# Patient Record
Sex: MALE | Race: WHITE | NOT HISPANIC OR LATINO | Employment: OTHER | URBAN - METROPOLITAN AREA
[De-identification: names, ages, dates, MRNs, and addresses within clinical notes are randomized per-mention and may not be internally consistent; named-entity substitution may affect disease eponyms.]

---

## 2017-01-16 DIAGNOSIS — Z01.810 PRE-OPERATIVE CARDIOVASCULAR EXAMINATION: ICD-10-CM

## 2017-01-16 LAB — EKG IMPRESSION: NORMAL

## 2017-01-16 RX ORDER — CLINDAMYCIN HYDROCHLORIDE 300 MG/1
300 CAPSULE ORAL 4 TIMES DAILY
Status: ON HOLD | COMMUNITY
End: 2019-05-14

## 2017-01-16 RX ORDER — CIPROFLOXACIN HYDROCHLORIDE 3.5 MG/ML
1 SOLUTION/ DROPS TOPICAL 4 TIMES DAILY
COMMUNITY
End: 2019-05-09

## 2017-01-16 RX ORDER — KETOROLAC TROMETHAMINE 5 MG/ML
1 SOLUTION OPHTHALMIC 2 TIMES DAILY
COMMUNITY
End: 2019-05-09

## 2017-01-16 RX ORDER — PREDNISOLONE ACETATE 10 MG/ML
1 SUSPENSION/ DROPS OPHTHALMIC 3 TIMES DAILY
COMMUNITY
End: 2019-05-09

## 2017-01-17 ENCOUNTER — HOSPITAL ENCOUNTER (OUTPATIENT)
Facility: MEDICAL CENTER | Age: 82
End: 2017-01-17
Attending: OPHTHALMOLOGY | Admitting: OPHTHALMOLOGY
Payer: MEDICARE

## 2017-01-17 VITALS
TEMPERATURE: 98.3 F | DIASTOLIC BLOOD PRESSURE: 65 MMHG | BODY MASS INDEX: 39.59 KG/M2 | RESPIRATION RATE: 16 BRPM | WEIGHT: 298.72 LBS | SYSTOLIC BLOOD PRESSURE: 110 MMHG | OXYGEN SATURATION: 92 % | HEIGHT: 73 IN | HEART RATE: 56 BPM

## 2017-01-17 PROBLEM — H26.9 RIGHT CATARACT: Status: ACTIVE | Noted: 2017-01-17

## 2017-01-17 PROCEDURE — 700101 HCHG RX REV CODE 250

## 2017-01-17 PROCEDURE — 700111 HCHG RX REV CODE 636 W/ 250 OVERRIDE (IP): Performed by: ANESTHESIOLOGY

## 2017-01-17 PROCEDURE — 500791 HCHG KNIFE, SLIT: Performed by: OPHTHALMOLOGY

## 2017-01-17 PROCEDURE — 501393 HCHG SOLUTION BSS 500ML: Performed by: OPHTHALMOLOGY

## 2017-01-17 PROCEDURE — 160048 HCHG OR STATISTICAL LEVEL 1-5: Performed by: OPHTHALMOLOGY

## 2017-01-17 PROCEDURE — 99153 MOD SED SAME PHYS/QHP EA: CPT | Performed by: OPHTHALMOLOGY

## 2017-01-17 PROCEDURE — 501749 HCHG SHELL REV 276: Performed by: OPHTHALMOLOGY

## 2017-01-17 PROCEDURE — 160029 HCHG SURGERY MINUTES - 1ST 30 MINS LEVEL 4: Performed by: OPHTHALMOLOGY

## 2017-01-17 PROCEDURE — 700111 HCHG RX REV CODE 636 W/ 250 OVERRIDE (IP)

## 2017-01-17 PROCEDURE — 160035 HCHG PACU - 1ST 60 MINS PHASE I: Performed by: OPHTHALMOLOGY

## 2017-01-17 PROCEDURE — 99152 MOD SED SAME PHYS/QHP 5/>YRS: CPT | Performed by: OPHTHALMOLOGY

## 2017-01-17 PROCEDURE — 160002 HCHG RECOVERY MINUTES (STAT): Performed by: OPHTHALMOLOGY

## 2017-01-17 DEVICE — IMPLANTABLE DEVICE: Type: IMPLANTABLE DEVICE | Status: FUNCTIONAL

## 2017-01-17 RX ORDER — TROPICAMIDE 10 MG/ML
SOLUTION/ DROPS OPHTHALMIC
Status: COMPLETED
Start: 2017-01-17 | End: 2017-01-17

## 2017-01-17 RX ORDER — SODIUM CHLORIDE, SODIUM LACTATE, POTASSIUM CHLORIDE, CALCIUM CHLORIDE 600; 310; 30; 20 MG/100ML; MG/100ML; MG/100ML; MG/100ML
INJECTION, SOLUTION INTRAVENOUS CONTINUOUS
Status: DISCONTINUED | OUTPATIENT
Start: 2017-01-17 | End: 2017-01-17 | Stop reason: HOSPADM

## 2017-01-17 RX ORDER — TETRACAINE HYDROCHLORIDE 5 MG/ML
SOLUTION OPHTHALMIC
Status: DISCONTINUED | OUTPATIENT
Start: 2017-01-17 | End: 2017-01-17 | Stop reason: HOSPADM

## 2017-01-17 RX ORDER — MOXIFLOXACIN 5 MG/ML
SOLUTION/ DROPS OPHTHALMIC
Status: COMPLETED
Start: 2017-01-17 | End: 2017-01-17

## 2017-01-17 RX ORDER — TETRACAINE HYDROCHLORIDE 5 MG/ML
SOLUTION OPHTHALMIC
Status: COMPLETED
Start: 2017-01-17 | End: 2017-01-17

## 2017-01-17 RX ORDER — PHENYLEPHRINE HYDROCHLORIDE 25 MG/ML
SOLUTION/ DROPS OPHTHALMIC
Status: COMPLETED
Start: 2017-01-17 | End: 2017-01-17

## 2017-01-17 RX ORDER — MOXIFLOXACIN 5 MG/ML
SOLUTION OPHTHALMIC
Status: DISCONTINUED | OUTPATIENT
Start: 2017-01-17 | End: 2017-01-17 | Stop reason: HOSPADM

## 2017-01-17 RX ORDER — KETOROLAC TROMETHAMINE 5 MG/ML
SOLUTION OPHTHALMIC
Status: COMPLETED
Start: 2017-01-17 | End: 2017-01-17

## 2017-01-17 RX ADMIN — TROPICAMIDE 1 DROP: 10 SOLUTION/ DROPS OPHTHALMIC at 08:21

## 2017-01-17 RX ADMIN — PHENYLEPHRINE HYDROCHLORIDE 1 DROP: 2.5 SOLUTION/ DROPS OPHTHALMIC at 08:21

## 2017-01-17 RX ADMIN — MOXIFLOXACIN HYDROCHLORIDE 1 DROP: 5 SOLUTION/ DROPS OPHTHALMIC at 08:06

## 2017-01-17 RX ADMIN — TROPICAMIDE 1 DROP: 10 SOLUTION/ DROPS OPHTHALMIC at 08:13

## 2017-01-17 RX ADMIN — TROPICAMIDE 1 DROP: 10 SOLUTION/ DROPS OPHTHALMIC at 08:07

## 2017-01-17 RX ADMIN — TETRACAINE HYDROCHLORIDE 1 DROP: 5 SOLUTION OPHTHALMIC at 08:21

## 2017-01-17 RX ADMIN — PHENYLEPHRINE HYDROCHLORIDE 1 DROP: 2.5 SOLUTION/ DROPS OPHTHALMIC at 08:13

## 2017-01-17 RX ADMIN — MOXIFLOXACIN HYDROCHLORIDE 1 DROP: 5 SOLUTION/ DROPS OPHTHALMIC at 08:13

## 2017-01-17 RX ADMIN — TETRACAINE HYDROCHLORIDE 1 DROP: 5 SOLUTION OPHTHALMIC at 08:07

## 2017-01-17 RX ADMIN — PHENYLEPHRINE HYDROCHLORIDE 1 DROP: 2.5 SOLUTION/ DROPS OPHTHALMIC at 08:07

## 2017-01-17 RX ADMIN — SODIUM CHLORIDE, POTASSIUM CHLORIDE, SODIUM LACTATE AND CALCIUM CHLORIDE 30 ML: 600; 310; 30; 20 INJECTION, SOLUTION INTRAVENOUS at 08:15

## 2017-01-17 RX ADMIN — KETOROLAC TROMETHAMINE 1 DROP: 5 SOLUTION OPHTHALMIC at 08:21

## 2017-01-17 RX ADMIN — KETOROLAC TROMETHAMINE 1 DROP: 5 SOLUTION OPHTHALMIC at 08:13

## 2017-01-17 RX ADMIN — MOXIFLOXACIN HYDROCHLORIDE 1 DROP: 5 SOLUTION/ DROPS OPHTHALMIC at 08:20

## 2017-01-17 RX ADMIN — KETOROLAC TROMETHAMINE 1 DROP: 5 SOLUTION OPHTHALMIC at 08:07

## 2017-01-17 ASSESSMENT — PAIN SCALES - GENERAL
PAINLEVEL_OUTOF10: 0

## 2017-01-17 NOTE — DISCHARGE INSTRUCTIONS
"  ACTIVITY: Rest and take it easy for the first 24 hours.  A responsible adult is recommended to remain with you during that time.  It is normal to feel sleepy.  We encourage you to not do anything that requires balance, judgment or coordination.    MILD FLU-LIKE SYMPTOMS ARE NORMAL. YOU MAY EXPERIENCE GENERALIZED MUSCLE ACHES, THROAT IRRITATION, HEADACHE AND/OR SOME NAUSEA.    FOR 24 HOURS DO NOT:  Drive, operate machinery or run household appliances.  Drink beer or alcoholic beverages.   Make important decisions or sign legal documents.    SPECIAL INSTRUCTIONS: See \"After Your Surgery\" instructions    DIET: To avoid nausea, slowly advance diet as tolerated, avoiding spicy or greasy foods for the first day.  Add more substantial food to your diet according to your physician's instructions.  Babies can be fed formula or breast milk as soon as they are hungry.  INCREASE FLUIDS AND FIBER TO AVOID CONSTIPATION.    SURGICAL DRESSING/BATHING: per instructions    FOLLOW-UP APPOINTMENT:  A follow-up appointment should be arranged with your doctor 2:15pm Today    You should CALL YOUR PHYSICIAN if you develop:  Fever greater than 101 degrees F.  Pain not relieved by medication, or persistent nausea or vomiting.  Excessive bleeding (blood soaking through dressing) or unexpected drainage from the wound.  Extreme redness or swelling around the incision site, drainage of pus or foul smelling drainage.  Inability to urinate or empty your bladder within 8 hours.  Problems with breathing or chest pain.    You should call 911 if you develop problems with breathing or chest pain.  If you are unable to contact your doctor or surgical center, you should go to the nearest emergency room or urgent care center.  Physician's telephone #: [phone] Dr Hinson    If any questions arise, call your doctor.  If your doctor is not available, please feel free to call the Surgical Center at [phone].  The Center is open Monday through Friday " from 7AM to 7PM.  You can also call the HEALTH HOTLINE open 24 hours/day, 7 days/week and speak to a nurse at [phone], or toll free at [phone].    A registered nurse may call you a few days after your surgery to see how you are doing after your procedure.    MEDICATIONS: Resume taking daily medication.  Take prescribed pain medication with food.  If no medication is prescribed, you may take non-aspirin pain medication if needed.  PAIN MEDICATION CAN BE VERY CONSTIPATING.  Take a stool softener or laxative such as senokot, pericolace, or milk of magnesia if needed.    Prescription given for N/A.  Last pain medication given at N/A.    If your physician has prescribed pain medication that includes Acetaminophen (Tylenol), do not take additional Acetaminophen (Tylenol) while taking the prescribed medication.    Depression / Suicide Risk    As you are discharged from this [facility] facility, it is important to learn how to keep safe from harming yourself.    Recognize the warning signs:  · Abrupt changes in personality, positive or negative- including increase in energy   · Giving away possessions  · Change in eating patterns- significant weight changes-  positive or negative  · Change in sleeping patterns- unable to sleep or sleeping all the time   · Unwillingness or inability to communicate  · Depression  · Unusual sadness, discouragement and loneliness  · Talk of wanting to die  · Neglect of personal appearance   · Rebelliousness- reckless behavior  · Withdrawal from people/activities they love  · Confusion- inability to concentrate     If you or a loved one observes any of these behaviors or has concerns about self-harm, here's what you can do:  · Talk about it- your feelings and reasons for harming yourself  · Remove any means that you might use to hurt yourself (examples: pills, rope, extension cords, firearm)  · Get professional help from the community (Mental Health, Substance Abuse,  psychological counseling)  · Do not be alone:Call your Safe Contact- someone whom you trust who will be there for you.  · Call your local CRISIS HOTLINE 725-8120 or 640-703-4771  · Call your local Children's Mobile Crisis Response Team [place] (312) 169-0435 or www.CXOWARE  · Call the toll free National Suicide Prevention Hotlines   · National Suicide Prevention Lifeline 614-815-INKP (9085)  · National Hope Line Network 800-SUICIDE (364-3228)

## 2017-01-17 NOTE — IP AVS SNAPSHOT
" After Visit Summary                                                                                                                Name:Castillo Echevarria Jr.  Medical Record Number:[id number]  CSN: [id number]    YOB: 1933   Age: 83 y.o.  Sex: male  HT:1.854 m (6' 1\") WT: 135.5 kg (298 lb 11.6 oz)          Admit Date: 1/17/2017     Discharge Date:   Today's Date: 1/17/2017  Attending Doctor:  Jero Hinson M.D.                  Allergies:  Review of patient's allergies indicates no known allergies.                Discharge Instructions         ACTIVITY: Rest and take it easy for the first 24 hours.  A responsible adult is recommended to remain with you during that time.  It is normal to feel sleepy.  We encourage you to not do anything that requires balance, judgment or coordination.    MILD FLU-LIKE SYMPTOMS ARE NORMAL. YOU MAY EXPERIENCE GENERALIZED MUSCLE ACHES, THROAT IRRITATION, HEADACHE AND/OR SOME NAUSEA.    FOR 24 HOURS DO NOT:  Drive, operate machinery or run household appliances.  Drink beer or alcoholic beverages.   Make important decisions or sign legal documents.    SPECIAL INSTRUCTIONS: See \"After Your Surgery\" instructions    DIET: To avoid nausea, slowly advance diet as tolerated, avoiding spicy or greasy foods for the first day.  Add more substantial food to your diet according to your physician's instructions.  Babies can be fed formula or breast milk as soon as they are hungry.  INCREASE FLUIDS AND FIBER TO AVOID CONSTIPATION.    SURGICAL DRESSING/BATHING: per instructions    FOLLOW-UP APPOINTMENT:  A follow-up appointment should be arranged with your doctor 2:15pm Today    You should CALL YOUR PHYSICIAN if you develop:  Fever greater than 101 degrees F.  Pain not relieved by medication, or persistent nausea or vomiting.  Excessive bleeding (blood soaking through dressing) or unexpected drainage from the wound.  Extreme redness or swelling around the incision site, drainage of pus " or foul smelling drainage.  Inability to urinate or empty your bladder within 8 hours.  Problems with breathing or chest pain.    You should call 911 if you develop problems with breathing or chest pain.  If you are unable to contact your doctor or surgical center, you should go to the nearest emergency room or urgent care center.  Physician's telephone #: [phone] Dr Hinson    If any questions arise, call your doctor.  If your doctor is not available, please feel free to call the Surgical Center at [phone].  The Center is open Monday through Friday from 7AM to 7PM.  You can also call the [employer] HOTLINE open 24 hours/day, 7 days/week and speak to a nurse at [phone], or toll free at [phone].    A registered nurse may call you a few days after your surgery to see how you are doing after your procedure.    MEDICATIONS: Resume taking daily medication.  Take prescribed pain medication with food.  If no medication is prescribed, you may take non-aspirin pain medication if needed.  PAIN MEDICATION CAN BE VERY CONSTIPATING.  Take a stool softener or laxative such as senokot, pericolace, or milk of magnesia if needed.    Prescription given for N/A.  Last pain medication given at N/A.    If your physician has prescribed pain medication that includes Acetaminophen (Tylenol), do not take additional Acetaminophen (Tylenol) while taking the prescribed medication.    Depression / Suicide Risk    As you are discharged from this [facility] facility, it is important to learn how to keep safe from harming yourself.    Recognize the warning signs:  · Abrupt changes in personality, positive or negative- including increase in energy   · Giving away possessions  · Change in eating patterns- significant weight changes-  positive or negative  · Change in sleeping patterns- unable to sleep or sleeping all the time   · Unwillingness or inability to communicate  · Depression  · Unusual sadness, discouragement and  loneliness  · Talk of wanting to die  · Neglect of personal appearance   · Rebelliousness- reckless behavior  · Withdrawal from people/activities they love  · Confusion- inability to concentrate     If you or a loved one observes any of these behaviors or has concerns about self-harm, here's what you can do:  · Talk about it- your feelings and reasons for harming yourself  · Remove any means that you might use to hurt yourself (examples: pills, rope, extension cords, firearm)  · Get professional help from the community (Mental Health, Substance Abuse, psychological counseling)  · Do not be alone:Call your Safe Contact- someone whom you trust who will be there for you.  · Call your local CRISIS HOTLINE 544-2198 or 619-491-4905  · Call your local Children's Mobile Crisis Response Team [place] (613) 477-1626 or www.Me[employer] Mantra  · Call the toll free National Suicide Prevention Hotlines   · National Suicide Prevention Lifeline 850-471-KPRN (5292)  · Nutricate Line Network 800-SUICIDE (546-9131)       Medication List      ASK your doctor about these medications        Instructions    atenolol 25 MG Tabs   Commonly known as:  TENORMIN    Take 25 mg by mouth every day.   Dose:  25 mg       ciprofloxacin 0.3 % Soln   Commonly known as:  CILOXIN    Place 1 Drop in both eyes 4 times a day.   Dose:  1 Drop       clindamycin 300 MG Caps   Commonly known as:  CLEOCIN    Take 300 mg by mouth. Takes 2 caps i hour pre-procedure   Dose:  300 mg       ketorolac 0.5 % Soln   Commonly known as:  ACULAR    Place 1 Drop in both eyes 2 Times a Day. Right eye   Dose:  1 Drop       levothyroxine 100 MCG Tabs   Commonly known as:  SYNTHROID    Take 100 mcg by mouth every day.   Dose:  100 mcg       LISINOPRIL-HYDROCHLOROTHIAZIDE PO    Take  by mouth every day.       prednisoLONE acetate 1 % Susp   Commonly known as:  PRED FORTE    Place 1 Drop in both eyes 3 times a day.   Dose:  1 Drop       simvastatin 10 MG Tabs   Commonly  known as:  ZOCOR    Take 10 mg by mouth every evening.   Dose:  10 mg       XARELTO 20 MG Tabs tablet   Generic drug:  rivaroxaban    Take 20 mg by mouth every day.   Dose:  20 mg               Medication Information     Above and/or attached are the medications your physician expects you to take upon discharge. Review all of your home medications and newly ordered medications with your doctor and/or pharmacist. Follow medication instructions as directed by your doctor and/or pharmacist. Please keep your medication list with you and share with your physician. Update the information when medications are discontinued, doses are changed, or new medications (including over-the-counter products) are added; and carry medication information at all times in the event of emergency situations.        Resources     Quit Smoking / Tobacco Use:    I understand the use of any tobacco products increases my chance of suffering from future heart disease or stroke and could cause other illnesses which may shorten my life. Quitting the use of tobacco products is the single most important thing I can do to improve my health. For further information on smoking / tobacco cessation call a Toll Free Quit Line at 1-389.229.4393 (*National Cancer [place]) or 1-465.588.6821 (American Lung Association) or you can access the web based program at www.lungusa.org.    [place] Tobacco Users Help Line:  (497) 147-4068       Toll Free: 1-849.207.7655  Quit Tobacco Program [facility] (029)585-4702    Crisis Hotline:    [place] Crisis Hotline:  5-724-UTSPZBM or 1-257.624.6672    [place] Crisis Hotline:    1-992.317.2581 or 213-466-0814    Discharge Survey:   Thank you for choosing [facility]. We hope we did everything we could to make your hospital stay a pleasant one. You may be receiving a survey and we would appreciate your time and participation in answering the questions. Your input is very valuable to us in our efforts  to improve our service to our patients and their families.            Signatures     My signature on this form indicates that:    1. I acknowledge receipt and understanding of these Home Care Instruction.  2. My questions regarding this information have been answered to my satisfaction.  3. I have formulated a plan with my discharge nurse to obtain my prescribed medications for home.    __________________________________      __________________________________                   Patient Signature                                 Guardian/Responsible Adult Signature      __________________________________                 __________       ________                       Nurse Signature                                               Date                 Time

## 2017-01-17 NOTE — OP REPORT
DATE OF SERVICE:  01/17/2017    DATE OF OPERATION:  01/17/2017     PREOPERATIVE DIAGNOSIS:   Cataract, right eye.     POSTOPERATIVE DIAGNOSIS:  Cataract, right eye.     PROCEDURE:   Clear cornea phacoemulsification, cataract extraction, with   posterior chamber intraocular lens implant, right eye.     SURGEON:  Jero Hinson MD     ASSISTANT:  Syed Jasso scrub tech.     ANESTHESIOLOGIST:  Nikolas Blue MD     ANESTHESIA:  Topical with IV sedation.     INDICATION:   Visually significant cataract, right eye.  Patient requested   surgery.     COMPLICATIONS:  None.     PROCEDURE:   After informed consent was obtained, the patient was transported   to the operating room and placed in the supine position.  The right   periorbital area was sterilely prepped and draped.  A lid speculum was   inserted.  Tetracaine ophthalmic anesthetic was applied to the corneal   surface.  Using a fixation ring and a carrol Step-Knife, a temporal clear   corneal groove incision was created.  This was followed by a paracentesis.    The anterior chamber was inflated with Viscoat.  The temporal incision was   completed using a 2.4 mm keratome.  A cystotome was inserted and a   capsulorrhexis was initiated.  This was completed using Utrata forceps.  BSS   on a Binkhorst followed angle cannula was used to hydrodissect the lens.  The   nucleus splitters were inserted, and the lens was divided into 4 quadrants.    The phacoemulsification handpiece was inserted and the lens was removed with   phacoemulsification parameters of  8.33 CDE.  The irrigation aspiration   handpiece was used to remove cortical material.  The capsular bag was inflated   with Provisc.  An intraocular lens mode PCB00 lens implant with 20.0   diopters of power was injected into the capsular bag.  Irrigation aspiration   was performed to remove viscoelastic.  The margins of the wound were hydrated   with BSS.  The wounds were noted to be maintaining physiologic  pressure.  The   speculum was removed and the eye was dressed with Vigamox  ophthalmic drops.  The patient tolerated the procedure well and was   transported to the recovery room in good condition.  No complications were   encountered.       ____________________________________     MD DILIA RODRIGES / ZACKERY    DD:  01/17/2017 11:40:37  DT:  01/17/2017 13:59:14    D#:  [id number]  Job#:  [id number]

## 2017-01-17 NOTE — OR NURSING
1025- updated Dr Sher on pt status.  Per Dr Sher, ok for pt to go home home, does not need to stay x 1 hr for stop bang protocol.  Pt DC'd home with wife at 1028.

## 2017-01-17 NOTE — IP AVS SNAPSHOT
1/17/2017          Castillo Echevarria Jr.  [address] [place] [id number]    Dear Castillo:    [facility] wants to ensure your discharge home is safe and you or your loved ones have had all your questions answered regarding your care after you leave the hospital.    You may receive a telephone call within two days of your discharge.  This call is to make certain you understand your discharge instructions as well as ensure we provided you with the best care possible during your stay with us.     The call will only last approximately 3-5 minutes and will be done by a nurse.    Once again, we want to ensure your discharge home is safe and that you have a clear understanding of any next steps in your care.  If you have any questions or concerns, please do not hesitate to contact us, we are here for you.  Thank you for choosing [facility] for your healthcare needs.    Sincerely,    José Gonzalez    [facility]

## 2017-01-17 NOTE — OR NURSING
Pt VSS.  Pt desats to 88% on RA then rebounds back up to  92-94%.  Pt getting dressed with assist, will monitor O2 sat on RA while up in chair.

## 2019-05-09 ENCOUNTER — HOSPITAL ENCOUNTER (INPATIENT)
Facility: MEDICAL CENTER | Age: 84
LOS: 5 days | DRG: 580 | End: 2019-05-14
Attending: EMERGENCY MEDICINE | Admitting: HOSPITALIST
Payer: MEDICARE

## 2019-05-09 DIAGNOSIS — L08.9 TOE INFECTION: ICD-10-CM

## 2019-05-09 DIAGNOSIS — L08.9 FOOT INFECTION: ICD-10-CM

## 2019-05-09 PROBLEM — D68.2 FACTOR V DEFICIENCY (HCC): Status: ACTIVE | Noted: 2019-05-09

## 2019-05-09 PROBLEM — E03.9 HYPOTHYROIDISM: Status: ACTIVE | Noted: 2019-05-09

## 2019-05-09 PROBLEM — E78.5 HLD (HYPERLIPIDEMIA): Status: ACTIVE | Noted: 2019-05-09

## 2019-05-09 LAB
ALBUMIN SERPL BCP-MCNC: 3.8 G/DL (ref 3.2–4.9)
ALBUMIN/GLOB SERPL: 1.5 G/DL
ALP SERPL-CCNC: 64 U/L (ref 30–99)
ALT SERPL-CCNC: 17 U/L (ref 2–50)
ANION GAP SERPL CALC-SCNC: 7 MMOL/L (ref 0–11.9)
AST SERPL-CCNC: 22 U/L (ref 12–45)
BASOPHILS # BLD AUTO: 0.4 % (ref 0–1.8)
BASOPHILS # BLD: 0.04 K/UL (ref 0–0.12)
BILIRUB SERPL-MCNC: 0.4 MG/DL (ref 0.1–1.5)
BUN SERPL-MCNC: 18 MG/DL (ref 8–22)
CALCIUM SERPL-MCNC: 8.8 MG/DL (ref 8.5–10.5)
CHLORIDE SERPL-SCNC: 103 MMOL/L (ref 96–112)
CO2 SERPL-SCNC: 30 MMOL/L (ref 20–33)
CREAT SERPL-MCNC: 1.03 MG/DL (ref 0.5–1.4)
CRP SERPL HS-MCNC: 43.5 MG/L (ref 0–7.5)
EOSINOPHIL # BLD AUTO: 0.32 K/UL (ref 0–0.51)
EOSINOPHIL NFR BLD: 3.5 % (ref 0–6.9)
ERYTHROCYTE [DISTWIDTH] IN BLOOD BY AUTOMATED COUNT: 46.2 FL (ref 35.9–50)
GLOBULIN SER CALC-MCNC: 2.5 G/DL (ref 1.9–3.5)
GLUCOSE SERPL-MCNC: 125 MG/DL (ref 65–99)
HCT VFR BLD AUTO: 42.7 % (ref 42–52)
HGB BLD-MCNC: 14.1 G/DL (ref 14–18)
IMM GRANULOCYTES # BLD AUTO: 0.1 K/UL (ref 0–0.11)
IMM GRANULOCYTES NFR BLD AUTO: 1.1 % (ref 0–0.9)
LACTATE BLD-SCNC: 1.4 MMOL/L (ref 0.5–2)
LYMPHOCYTES # BLD AUTO: 1.93 K/UL (ref 1–4.8)
LYMPHOCYTES NFR BLD: 21.2 % (ref 22–41)
MCH RBC QN AUTO: 32.2 PG (ref 27–33)
MCHC RBC AUTO-ENTMCNC: 33 G/DL (ref 33.7–35.3)
MCV RBC AUTO: 97.5 FL (ref 81.4–97.8)
MONOCYTES # BLD AUTO: 0.71 K/UL (ref 0–0.85)
MONOCYTES NFR BLD AUTO: 7.8 % (ref 0–13.4)
NEUTROPHILS # BLD AUTO: 6.02 K/UL (ref 1.82–7.42)
NEUTROPHILS NFR BLD: 66 % (ref 44–72)
NRBC # BLD AUTO: 0 K/UL
NRBC BLD-RTO: 0 /100 WBC
PLATELET # BLD AUTO: 198 K/UL (ref 164–446)
PMV BLD AUTO: 8.9 FL (ref 9–12.9)
POTASSIUM SERPL-SCNC: 4 MMOL/L (ref 3.6–5.5)
PROT SERPL-MCNC: 6.3 G/DL (ref 6–8.2)
RBC # BLD AUTO: 4.38 M/UL (ref 4.7–6.1)
SODIUM SERPL-SCNC: 140 MMOL/L (ref 135–145)
WBC # BLD AUTO: 9.1 K/UL (ref 4.8–10.8)

## 2019-05-09 PROCEDURE — 99285 EMERGENCY DEPT VISIT HI MDM: CPT

## 2019-05-09 PROCEDURE — 96365 THER/PROPH/DIAG IV INF INIT: CPT

## 2019-05-09 PROCEDURE — 86141 C-REACTIVE PROTEIN HS: CPT

## 2019-05-09 PROCEDURE — 80048 BASIC METABOLIC PNL TOTAL CA: CPT

## 2019-05-09 PROCEDURE — 99223 1ST HOSP IP/OBS HIGH 75: CPT | Mod: AI | Performed by: HOSPITALIST

## 2019-05-09 PROCEDURE — 700105 HCHG RX REV CODE 258: Performed by: HOSPITALIST

## 2019-05-09 PROCEDURE — 36415 COLL VENOUS BLD VENIPUNCTURE: CPT

## 2019-05-09 PROCEDURE — 85025 COMPLETE CBC W/AUTO DIFF WBC: CPT

## 2019-05-09 PROCEDURE — 770006 HCHG ROOM/CARE - MED/SURG/GYN SEMI*

## 2019-05-09 PROCEDURE — 85730 THROMBOPLASTIN TIME PARTIAL: CPT

## 2019-05-09 PROCEDURE — 83605 ASSAY OF LACTIC ACID: CPT

## 2019-05-09 PROCEDURE — 87040 BLOOD CULTURE FOR BACTERIA: CPT | Mod: 91

## 2019-05-09 PROCEDURE — 96367 TX/PROPH/DG ADDL SEQ IV INF: CPT

## 2019-05-09 PROCEDURE — 85027 COMPLETE CBC AUTOMATED: CPT

## 2019-05-09 PROCEDURE — 700105 HCHG RX REV CODE 258: Performed by: EMERGENCY MEDICINE

## 2019-05-09 PROCEDURE — 700111 HCHG RX REV CODE 636 W/ 250 OVERRIDE (IP): Performed by: EMERGENCY MEDICINE

## 2019-05-09 PROCEDURE — 80053 COMPREHEN METABOLIC PANEL: CPT

## 2019-05-09 RX ORDER — AMOXICILLIN 250 MG
2 CAPSULE ORAL 2 TIMES DAILY
Status: DISCONTINUED | OUTPATIENT
Start: 2019-05-09 | End: 2019-05-14 | Stop reason: HOSPADM

## 2019-05-09 RX ORDER — SODIUM CHLORIDE 9 MG/ML
500 INJECTION, SOLUTION INTRAVENOUS
Status: COMPLETED | OUTPATIENT
Start: 2019-05-09 | End: 2019-05-09

## 2019-05-09 RX ORDER — VITS A,C,E/LUTEIN/MINERALS 300MCG-200
1 TABLET ORAL DAILY
COMMUNITY

## 2019-05-09 RX ORDER — SODIUM CHLORIDE 9 MG/ML
1000 INJECTION, SOLUTION INTRAVENOUS ONCE
Status: COMPLETED | OUTPATIENT
Start: 2019-05-09 | End: 2019-05-09

## 2019-05-09 RX ORDER — ATENOLOL 50 MG/1
25 TABLET ORAL DAILY
Status: DISCONTINUED | OUTPATIENT
Start: 2019-05-10 | End: 2019-05-14 | Stop reason: HOSPADM

## 2019-05-09 RX ORDER — HEPARIN SODIUM 1000 [USP'U]/ML
4400 INJECTION, SOLUTION INTRAVENOUS; SUBCUTANEOUS PRN
Status: DISCONTINUED | OUTPATIENT
Start: 2019-05-09 | End: 2019-05-11

## 2019-05-09 RX ORDER — POLYETHYLENE GLYCOL 3350 17 G/17G
1 POWDER, FOR SOLUTION ORAL
Status: DISCONTINUED | OUTPATIENT
Start: 2019-05-09 | End: 2019-05-14 | Stop reason: HOSPADM

## 2019-05-09 RX ORDER — ACETAMINOPHEN 325 MG/1
650 TABLET ORAL EVERY 6 HOURS PRN
Status: DISCONTINUED | OUTPATIENT
Start: 2019-05-09 | End: 2019-05-14 | Stop reason: HOSPADM

## 2019-05-09 RX ORDER — ATORVASTATIN CALCIUM 10 MG/1
10 TABLET, FILM COATED ORAL NIGHTLY
Status: DISCONTINUED | OUTPATIENT
Start: 2019-05-09 | End: 2019-05-14 | Stop reason: HOSPADM

## 2019-05-09 RX ORDER — ATORVASTATIN CALCIUM 10 MG/1
10 TABLET, FILM COATED ORAL NIGHTLY
COMMUNITY

## 2019-05-09 RX ORDER — BUMETANIDE 1 MG/1
1 TABLET ORAL DAILY
COMMUNITY

## 2019-05-09 RX ORDER — BUMETANIDE 1 MG/1
1 TABLET ORAL DAILY
Status: DISCONTINUED | OUTPATIENT
Start: 2019-05-10 | End: 2019-05-14 | Stop reason: HOSPADM

## 2019-05-09 RX ORDER — BISACODYL 10 MG
10 SUPPOSITORY, RECTAL RECTAL
Status: DISCONTINUED | OUTPATIENT
Start: 2019-05-09 | End: 2019-05-14 | Stop reason: HOSPADM

## 2019-05-09 RX ADMIN — AMPICILLIN AND SULBACTAM 3 G: 2; 1 INJECTION, POWDER, FOR SOLUTION INTRAVENOUS at 19:42

## 2019-05-09 RX ADMIN — SODIUM CHLORIDE 500 ML: 9 INJECTION, SOLUTION INTRAVENOUS at 21:48

## 2019-05-09 RX ADMIN — SODIUM CHLORIDE 1000 ML: 9 INJECTION, SOLUTION INTRAVENOUS at 19:43

## 2019-05-09 RX ADMIN — VANCOMYCIN HYDROCHLORIDE 3000 MG: 100 INJECTION, POWDER, LYOPHILIZED, FOR SOLUTION INTRAVENOUS at 21:49

## 2019-05-09 ASSESSMENT — PATIENT HEALTH QUESTIONNAIRE - PHQ9
SUM OF ALL RESPONSES TO PHQ9 QUESTIONS 1 AND 2: 0
2. FEELING DOWN, DEPRESSED, IRRITABLE, OR HOPELESS: NOT AT ALL
1. LITTLE INTEREST OR PLEASURE IN DOING THINGS: NOT AT ALL

## 2019-05-09 ASSESSMENT — ENCOUNTER SYMPTOMS
COUGH: 0
WHEEZING: 0
FOCAL WEAKNESS: 0
SORE THROAT: 0
VOMITING: 0
NAUSEA: 0
DIARRHEA: 0
DIZZINESS: 0
FEVER: 0
PHOTOPHOBIA: 0
DEPRESSION: 0
CHILLS: 0
SHORTNESS OF BREATH: 0
TINGLING: 0
HEADACHES: 0
ABDOMINAL PAIN: 0
MYALGIAS: 0
PALPITATIONS: 0

## 2019-05-10 ENCOUNTER — ANESTHESIA (OUTPATIENT)
Dept: SURGERY | Facility: MEDICAL CENTER | Age: 84
DRG: 580 | End: 2019-05-10
Payer: MEDICARE

## 2019-05-10 ENCOUNTER — ANESTHESIA EVENT (OUTPATIENT)
Dept: SURGERY | Facility: MEDICAL CENTER | Age: 84
DRG: 580 | End: 2019-05-10
Payer: MEDICARE

## 2019-05-10 PROBLEM — D68.51 FACTOR V LEIDEN (HCC): Status: ACTIVE | Noted: 2019-05-09

## 2019-05-10 PROBLEM — D68.59 HYPERCOAGULABLE STATE (HCC): Status: ACTIVE | Noted: 2019-05-09

## 2019-05-10 LAB
ANION GAP SERPL CALC-SCNC: 8 MMOL/L (ref 0–11.9)
APTT PPP: 27 SEC (ref 24.7–36)
APTT PPP: 28.3 SEC (ref 24.7–36)
APTT PPP: 52.8 SEC (ref 24.7–36)
BUN SERPL-MCNC: 17 MG/DL (ref 8–22)
CALCIUM SERPL-MCNC: 8.5 MG/DL (ref 8.5–10.5)
CHLORIDE SERPL-SCNC: 105 MMOL/L (ref 96–112)
CO2 SERPL-SCNC: 29 MMOL/L (ref 20–33)
CREAT SERPL-MCNC: 1.03 MG/DL (ref 0.5–1.4)
ERYTHROCYTE [DISTWIDTH] IN BLOOD BY AUTOMATED COUNT: 46.1 FL (ref 35.9–50)
GLUCOSE SERPL-MCNC: 237 MG/DL (ref 65–99)
GRAM STN SPEC: NORMAL
HCT VFR BLD AUTO: 42.9 % (ref 42–52)
HGB BLD-MCNC: 14.2 G/DL (ref 14–18)
MCH RBC QN AUTO: 32.3 PG (ref 27–33)
MCHC RBC AUTO-ENTMCNC: 33.1 G/DL (ref 33.7–35.3)
MCV RBC AUTO: 97.5 FL (ref 81.4–97.8)
PATHOLOGY CONSULT NOTE: NORMAL
PLATELET # BLD AUTO: 189 K/UL (ref 164–446)
PMV BLD AUTO: 8.8 FL (ref 9–12.9)
POTASSIUM SERPL-SCNC: 3.6 MMOL/L (ref 3.6–5.5)
RBC # BLD AUTO: 4.4 M/UL (ref 4.7–6.1)
SIGNIFICANT IND 70042: NORMAL
SITE SITE: NORMAL
SODIUM SERPL-SCNC: 142 MMOL/L (ref 135–145)
SOURCE SOURCE: NORMAL
WBC # BLD AUTO: 9 K/UL (ref 4.8–10.8)

## 2019-05-10 PROCEDURE — 99233 SBSQ HOSP IP/OBS HIGH 50: CPT | Performed by: INTERNAL MEDICINE

## 2019-05-10 PROCEDURE — 700111 HCHG RX REV CODE 636 W/ 250 OVERRIDE (IP): Performed by: ANESTHESIOLOGY

## 2019-05-10 PROCEDURE — 87205 SMEAR GRAM STAIN: CPT

## 2019-05-10 PROCEDURE — 700102 HCHG RX REV CODE 250 W/ 637 OVERRIDE(OP): Performed by: HOSPITALIST

## 2019-05-10 PROCEDURE — 700111 HCHG RX REV CODE 636 W/ 250 OVERRIDE (IP): Performed by: HOSPITALIST

## 2019-05-10 PROCEDURE — 700101 HCHG RX REV CODE 250: Performed by: ANESTHESIOLOGY

## 2019-05-10 PROCEDURE — 770001 HCHG ROOM/CARE - MED/SURG/GYN PRIV*

## 2019-05-10 PROCEDURE — 88305 TISSUE EXAM BY PATHOLOGIST: CPT

## 2019-05-10 PROCEDURE — 160039 HCHG SURGERY MINUTES - EA ADDL 1 MIN LEVEL 3: Performed by: ORTHOPAEDIC SURGERY

## 2019-05-10 PROCEDURE — 700105 HCHG RX REV CODE 258

## 2019-05-10 PROCEDURE — 87075 CULTR BACTERIA EXCEPT BLOOD: CPT

## 2019-05-10 PROCEDURE — 87070 CULTURE OTHR SPECIMN AEROBIC: CPT

## 2019-05-10 PROCEDURE — 36415 COLL VENOUS BLD VENIPUNCTURE: CPT

## 2019-05-10 PROCEDURE — 700105 HCHG RX REV CODE 258: Performed by: HOSPITALIST

## 2019-05-10 PROCEDURE — 88311 DECALCIFY TISSUE: CPT

## 2019-05-10 PROCEDURE — 160035 HCHG PACU - 1ST 60 MINS PHASE I: Performed by: ORTHOPAEDIC SURGERY

## 2019-05-10 PROCEDURE — 160036 HCHG PACU - EA ADDL 30 MINS PHASE I: Performed by: ORTHOPAEDIC SURGERY

## 2019-05-10 PROCEDURE — 160048 HCHG OR STATISTICAL LEVEL 1-5: Performed by: ORTHOPAEDIC SURGERY

## 2019-05-10 PROCEDURE — 160002 HCHG RECOVERY MINUTES (STAT): Performed by: ORTHOPAEDIC SURGERY

## 2019-05-10 PROCEDURE — 500881 HCHG PACK, EXTREMITY: Performed by: ORTHOPAEDIC SURGERY

## 2019-05-10 PROCEDURE — 0QBQ0ZZ EXCISION OF RIGHT TOE PHALANX, OPEN APPROACH: ICD-10-PCS | Performed by: ORTHOPAEDIC SURGERY

## 2019-05-10 PROCEDURE — 501838 HCHG SUTURE GENERAL: Performed by: ORTHOPAEDIC SURGERY

## 2019-05-10 PROCEDURE — 85730 THROMBOPLASTIN TIME PARTIAL: CPT

## 2019-05-10 PROCEDURE — A9270 NON-COVERED ITEM OR SERVICE: HCPCS | Performed by: HOSPITALIST

## 2019-05-10 PROCEDURE — 0Y6R0Z0 DETACHMENT AT RIGHT 2ND TOE, COMPLETE, OPEN APPROACH: ICD-10-PCS | Performed by: ORTHOPAEDIC SURGERY

## 2019-05-10 PROCEDURE — 700105 HCHG RX REV CODE 258: Performed by: ANESTHESIOLOGY

## 2019-05-10 PROCEDURE — 87015 SPECIMEN INFECT AGNT CONCNTJ: CPT

## 2019-05-10 PROCEDURE — A6222 GAUZE <=16 IN NO W/SAL W/O B: HCPCS | Performed by: ORTHOPAEDIC SURGERY

## 2019-05-10 PROCEDURE — 160009 HCHG ANES TIME/MIN: Performed by: ORTHOPAEDIC SURGERY

## 2019-05-10 PROCEDURE — 160028 HCHG SURGERY MINUTES - 1ST 30 MINS LEVEL 3: Performed by: ORTHOPAEDIC SURGERY

## 2019-05-10 RX ORDER — SODIUM CHLORIDE, SODIUM LACTATE, POTASSIUM CHLORIDE, CALCIUM CHLORIDE 600; 310; 30; 20 MG/100ML; MG/100ML; MG/100ML; MG/100ML
INJECTION, SOLUTION INTRAVENOUS
Status: DISCONTINUED | OUTPATIENT
Start: 2019-05-10 | End: 2019-05-10 | Stop reason: SURG

## 2019-05-10 RX ORDER — OXYCODONE HCL 5 MG/5 ML
5 SOLUTION, ORAL ORAL
Status: DISCONTINUED | OUTPATIENT
Start: 2019-05-10 | End: 2019-05-10 | Stop reason: HOSPADM

## 2019-05-10 RX ORDER — SODIUM CHLORIDE, SODIUM LACTATE, POTASSIUM CHLORIDE, CALCIUM CHLORIDE 600; 310; 30; 20 MG/100ML; MG/100ML; MG/100ML; MG/100ML
INJECTION, SOLUTION INTRAVENOUS CONTINUOUS
Status: DISCONTINUED | OUTPATIENT
Start: 2019-05-10 | End: 2019-05-10 | Stop reason: HOSPADM

## 2019-05-10 RX ORDER — ONDANSETRON 2 MG/ML
INJECTION INTRAMUSCULAR; INTRAVENOUS PRN
Status: DISCONTINUED | OUTPATIENT
Start: 2019-05-10 | End: 2019-05-10 | Stop reason: SURG

## 2019-05-10 RX ORDER — DEXAMETHASONE SODIUM PHOSPHATE 4 MG/ML
INJECTION, SOLUTION INTRA-ARTICULAR; INTRALESIONAL; INTRAMUSCULAR; INTRAVENOUS; SOFT TISSUE PRN
Status: DISCONTINUED | OUTPATIENT
Start: 2019-05-10 | End: 2019-05-10 | Stop reason: SURG

## 2019-05-10 RX ORDER — LIDOCAINE HYDROCHLORIDE 10 MG/ML
INJECTION, SOLUTION INFILTRATION; PERINEURAL
Status: DISCONTINUED | OUTPATIENT
Start: 2019-05-10 | End: 2019-05-10 | Stop reason: HOSPADM

## 2019-05-10 RX ORDER — BUPIVACAINE HYDROCHLORIDE 5 MG/ML
INJECTION, SOLUTION EPIDURAL; INTRACAUDAL
Status: DISCONTINUED | OUTPATIENT
Start: 2019-05-10 | End: 2019-05-10 | Stop reason: HOSPADM

## 2019-05-10 RX ORDER — SODIUM CHLORIDE 9 MG/ML
INJECTION, SOLUTION INTRAVENOUS
Status: COMPLETED
Start: 2019-05-10 | End: 2019-05-10

## 2019-05-10 RX ORDER — SODIUM CHLORIDE 450 MG/100ML
INJECTION, SOLUTION INTRAVENOUS
Status: ACTIVE
Start: 2019-05-10 | End: 2019-05-10

## 2019-05-10 RX ORDER — VANCOMYCIN HYDROCHLORIDE 500 MG/10ML
INJECTION, POWDER, LYOPHILIZED, FOR SOLUTION INTRAVENOUS
Status: COMPLETED | OUTPATIENT
Start: 2019-05-10 | End: 2019-05-10

## 2019-05-10 RX ORDER — MEPERIDINE HYDROCHLORIDE 25 MG/ML
12.5 INJECTION INTRAMUSCULAR; INTRAVENOUS; SUBCUTANEOUS
Status: DISCONTINUED | OUTPATIENT
Start: 2019-05-10 | End: 2019-05-10 | Stop reason: HOSPADM

## 2019-05-10 RX ORDER — CEFAZOLIN SODIUM 1 G/3ML
INJECTION, POWDER, FOR SOLUTION INTRAMUSCULAR; INTRAVENOUS PRN
Status: DISCONTINUED | OUTPATIENT
Start: 2019-05-10 | End: 2019-05-10 | Stop reason: SURG

## 2019-05-10 RX ORDER — OXYCODONE HCL 5 MG/5 ML
10 SOLUTION, ORAL ORAL
Status: DISCONTINUED | OUTPATIENT
Start: 2019-05-10 | End: 2019-05-10 | Stop reason: HOSPADM

## 2019-05-10 RX ORDER — ONDANSETRON 2 MG/ML
4 INJECTION INTRAMUSCULAR; INTRAVENOUS
Status: DISCONTINUED | OUTPATIENT
Start: 2019-05-10 | End: 2019-05-10 | Stop reason: HOSPADM

## 2019-05-10 RX ADMIN — CEFAZOLIN 3 G: 330 INJECTION, POWDER, FOR SOLUTION INTRAMUSCULAR; INTRAVENOUS at 13:08

## 2019-05-10 RX ADMIN — AMPICILLIN SODIUM AND SULBACTAM SODIUM 3 G: 2; 1 INJECTION, POWDER, FOR SOLUTION INTRAMUSCULAR; INTRAVENOUS at 20:40

## 2019-05-10 RX ADMIN — VANCOMYCIN HYDROCHLORIDE 2000 MG: 100 INJECTION, POWDER, LYOPHILIZED, FOR SOLUTION INTRAVENOUS at 23:09

## 2019-05-10 RX ADMIN — SODIUM CHLORIDE 1000 ML: 9 INJECTION, SOLUTION INTRAVENOUS at 02:12

## 2019-05-10 RX ADMIN — ATORVASTATIN CALCIUM 10 MG: 10 TABLET, FILM COATED ORAL at 20:30

## 2019-05-10 RX ADMIN — ATENOLOL 25 MG: 50 TABLET ORAL at 06:00

## 2019-05-10 RX ADMIN — AMPICILLIN SODIUM AND SULBACTAM SODIUM 3 G: 2; 1 INJECTION, POWDER, FOR SOLUTION INTRAMUSCULAR; INTRAVENOUS at 09:07

## 2019-05-10 RX ADMIN — LEVOTHYROXINE SODIUM 100 MCG: 25 TABLET ORAL at 06:01

## 2019-05-10 RX ADMIN — ONDANSETRON 4 MG: 2 INJECTION INTRAMUSCULAR; INTRAVENOUS at 13:15

## 2019-05-10 RX ADMIN — DEXAMETHASONE SODIUM PHOSPHATE 4 MG: 4 INJECTION, SOLUTION INTRA-ARTICULAR; INTRALESIONAL; INTRAMUSCULAR; INTRAVENOUS; SOFT TISSUE at 13:08

## 2019-05-10 RX ADMIN — PROPOFOL 200 MG: 10 INJECTION, EMULSION INTRAVENOUS at 13:08

## 2019-05-10 RX ADMIN — AMPICILLIN SODIUM AND SULBACTAM SODIUM 3 G: 2; 1 INJECTION, POWDER, FOR SOLUTION INTRAMUSCULAR; INTRAVENOUS at 02:06

## 2019-05-10 RX ADMIN — HEPARIN SODIUM 1700 UNITS/HR: 5000 INJECTION, SOLUTION INTRAVENOUS at 01:15

## 2019-05-10 RX ADMIN — SODIUM CHLORIDE, POTASSIUM CHLORIDE, SODIUM LACTATE AND CALCIUM CHLORIDE: 600; 310; 30; 20 INJECTION, SOLUTION INTRAVENOUS at 13:03

## 2019-05-10 ASSESSMENT — LIFESTYLE VARIABLES
EVER FELT BAD OR GUILTY ABOUT YOUR DRINKING: NO
EVER HAD A DRINK FIRST THING IN THE MORNING TO STEADY YOUR NERVES TO GET RID OF A HANGOVER: NO
ON A TYPICAL DAY WHEN YOU DRINK ALCOHOL HOW MANY DRINKS DO YOU HAVE: 1
ALCOHOL_USE: YES
TOTAL SCORE: 0
TOTAL SCORE: 0
HAVE YOU EVER FELT YOU SHOULD CUT DOWN ON YOUR DRINKING: NO
AVERAGE NUMBER OF DAYS PER WEEK YOU HAVE A DRINK CONTAINING ALCOHOL: 7
TOTAL SCORE: 0
CONSUMPTION TOTAL: NEGATIVE
HOW MANY TIMES IN THE PAST YEAR HAVE YOU HAD 5 OR MORE DRINKS IN A DAY: 0
EVER_SMOKED: NEVER
HAVE PEOPLE ANNOYED YOU BY CRITICIZING YOUR DRINKING: NO

## 2019-05-10 ASSESSMENT — COGNITIVE AND FUNCTIONAL STATUS - GENERAL
DAILY ACTIVITIY SCORE: 23
SUGGESTED CMS G CODE MODIFIER DAILY ACTIVITY: CI
STANDING UP FROM CHAIR USING ARMS: A LITTLE
SUGGESTED CMS G CODE MODIFIER MOBILITY: CJ
MOVING TO AND FROM BED TO CHAIR: A LITTLE
MOBILITY SCORE: 21
CLIMB 3 TO 5 STEPS WITH RAILING: A LITTLE
HELP NEEDED FOR BATHING: A LITTLE

## 2019-05-10 NOTE — PROGRESS NOTES
· 2 RN skin check complete with EDVIN Gruber.  · Devices in place: SCDs.   · Skin assessed under devices: Yes. Redness and dry skin noted on bilateral shins. Dryness and bumps noted on sacrum and lower back. Redness noted under abdominal flap possibly from belt the patient is wearing, skin is blanching. Scab noted in the mid upper abdomen. Bilateral heels are dry and skin appears flaky. Slight purulent drainage noted from second right toe.     · The following interventions in place: SCDs for DVT prophylaxis and moisturizer for skin.

## 2019-05-10 NOTE — CARE PLAN
Problem: Communication  Goal: The ability to communicate needs accurately and effectively will improve  Outcome: PROGRESSING AS EXPECTED  Plan of care discussed. Patient encouraged to use call light to alert staff of needs. Patient verbalized understanding and calls to alert staff of needs. Call light within reach. Hourly rounding in place.     Problem: Venous Thromboembolism (VTW)/Deep Vein Thrombosis (DVT) Prevention:  Goal: Patient will participate in Venous Thrombosis (VTE)/Deep Vein Thrombosis (DVT)Prevention Measures   05/09/19 2210   Mechanical/VTE Prophylaxis   SCDs, Sequential Compression Device On     Bilateral SCDs in place. Patient understands their purpose.

## 2019-05-10 NOTE — ASSESSMENT & PLAN NOTE
Continue hep agtt bridge  On 5/11, Orthopedics cleared ro restart Eliquis  Discussed with Pharmacy. Increased Eliquis dosing compared to home dose given indication

## 2019-05-10 NOTE — DISCHARGE PLANNING
Care Transition Team Assessment    Unable to complete assessment secondary to physician arriving. Wife will take patient home upon discharge.    Information Source  Orientation : Oriented x 4  Information Given By: Patient (patient and spouse)  Who is responsible for making decisions for patient? : Patient         Elopement Risk  Legal Hold: No    Interdisciplinary Discharge Planning  Does Admitting Nurse Feel This Could be a Complex Discharge?: No  Primary Care Physician: Yosi Thornton MD  Lives with - Patient's Self Care Capacity: Spouse  Patient or legal guardian wants to designate a caregiver (see row info): Yes  Caregiver name: Shila Echevarria  Caregiver relationship to patient: spouse  Caregiver contact info: [phone]  Support Systems: [employer] / [place] [facility], Family Member(s), Friends / Neighbors  Do You Take your Prescribed Medications Regularly: Yes  Able to Return to Previous ADL's: Yes  Mobility Issues: Yes (cane for distance)  Patient Expects to be Discharged to:: Home (hoem)  Assistance Needed: No  Durable Medical Equipment: Other - Specify (cane for distances)    Discharge Preparedness  What is your plan after discharge?: Other (comment) (home independent)  What are your discharge supports?: Spouse  Prior Functional Level: Drives Self, Independent with Activities of Daily Living, Independent with Medication Management  Difficulity with ADLs: Other (applying compression stockings wife assist)    Functional Assesment  Prior Functional Level: Drives Self, Independent with Activities of Daily Living, Independent with Medication Management    Finances  Financial Barriers to Discharge: No  Prescription Coverage: Yes              Advance Directive  Advance Directive?: Living Will, DPOA for Health Care  Durable Power of  Name and Contact : Shila Echevarria [phone]    Domestic Abuse  Have you ever been the victim of abuse or violence?:  (not asked spouse in room)              Anticipated Discharge  Information  Anticipated discharge disposition: Home  Discharge Address: [address] [place] [address]  Discharge Contact Phone Number: [phone]

## 2019-05-10 NOTE — PROGRESS NOTES
Received report from ED RN Su. Patient arrived to unit with patient transport and RN via gurney without incident.

## 2019-05-10 NOTE — CARE PLAN
Problem: Safety  Goal: Will remain free from injury  Outcome: PROGRESSING AS EXPECTED  Safety precautions in place. Call light within reach. Bed is low and in locked position. Hourly rounding in place.     Problem: Infection  Goal: Will remain free from infection  Outcome: PROGRESSING AS EXPECTED  Patient educated on hand hygiene and infection control

## 2019-05-10 NOTE — ED NOTES
Med rec complete per pt at bedside with S/O and list (returned)  NKDA  Pt started Clinda 300mg four times daily 5 days ago

## 2019-05-10 NOTE — ANESTHESIA PROCEDURE NOTES
Airway  Date/Time: 5/10/2019 1:09 PM  Performed by: GANSERT, TOBEY B  Authorized by: GANSERT, TOBEY B     Location:  OR  Urgency:  Elective  Indications for Airway Management:  Anesthesia  Spontaneous Ventilation: absent    Sedation Level:  Deep  Preoxygenated: Yes    Final Airway Type:  Supraglottic airway  Final Supraglottic Airway:  Standard LMA  SGA Size:  5  Number of Attempts at Approach:  1

## 2019-05-10 NOTE — ASSESSMENT & PLAN NOTE
Continue hepa gtt bridge  On 5/11, Orthopedics cleared ro restart Eliquis  Discussed with Pharmacy. Increased Eliquis dosing compared to home dose given indication

## 2019-05-10 NOTE — ANESTHESIA PREPROCEDURE EVALUATION
Relevant Problems   (+) HTN (hypertension)   (+) Hypothyroidism       Physical Exam    Airway   Mallampati: II  TM distance: >3 FB  Neck ROM: full       Cardiovascular - normal exam  Rhythm: regular  Rate: normal  (-) murmur     Dental - normal exam         Pulmonary - normal exam  Breath sounds clear to auscultation     Abdominal    Neurological - normal exam                 Anesthesia Plan    ASA 2       Plan - general       Airway plan will be LMA        Induction: intravenous    Postoperative Plan: Postoperative administration of opioids is intended.    Pertinent diagnostic labs and testing reviewed    Informed Consent:    Anesthetic plan and risks discussed with patient.

## 2019-05-10 NOTE — ED NOTES
Pt very SOB upon arrival to room. Pt walked back to [address] with volunteer. Pt reported he needed to go to the restroom and this RN offered a urinal which the pt refused. Pt then got up and walked to the restroom with his cane.

## 2019-05-10 NOTE — PROGRESS NOTES
[facility] Medicine Daily Progress Note    Date of Service  5/10/2019    Chief Complaint  85 y.o. male admitted 5/9/2019 with Wound Infection (2nd toe on rt foot is infected and failed outpt tx.  [facility] sent him to 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  Presented with synptoms that began 3 weeks ago after his right second toenail fell off where he had progressively increasing pain and swelling of the right second toe as well as increased erythema on the right second toe and forefoot. He was seen at urgent care and was provided with a prescription for clindamycin.  His symptoms worsened with purulent discharge and streaking erythema per Wife last Monday, so he returned Urgent Care who then referred them to the ED. On evaluation there was concern for osteomyelitis of the L 2nd toe. Dr. Gomez, Orthoprdics LPS is consulted and plans for toe amputation 5/10      Interval Problem Update  5/10. Underwent L 2nd toe amputation. Tolerated procedure. Sleeping but he seems irritable when I tried to wake him up. His family at bedside. Family reports he is on anticoagulation and anxious as they want it restarted.    Consultants/Specialty  Orthopedics    Code Status  full    Disposition  PT/OT pending  Offer [facility]    Review of Systems  Review of Systems   Unable to perform ROS: Mental acuity    Sleeping, did not want to be disturbed    Physical Exam  Temp:  [35.9 °C (96.7 °F)-36.7 °C (98 °F)] 36.1 °C (97 °F)  Pulse:  [54-68] 56  Resp:  [16-20] 16  BP: (102-163)/(49-88) 111/50  SpO2:  [90 %-98 %] 98 %    Physical Exam   Constitutional: He appears well-developed.   Elderly   HENT:   Head: Normocephalic and atraumatic.   Eyes: Conjunctivae are normal. No scleral icterus.   Neck: Normal range of motion. Neck supple.   Cardiovascular: Normal rate and regular rhythm.  Exam reveals no gallop  and no friction rub.    No murmur heard.  Pulmonary/Chest: Effort normal and breath sounds normal. No respiratory distress. He has no wheezes. He has no rales.   Abdominal: Soft. Bowel sounds are normal. He exhibits no distension. There is no tenderness. There is no rebound and no guarding.   Musculoskeletal: He exhibits tenderness (L foot, minimal). He exhibits no edema.   L foot dressing, CDI   Neurological: He is alert.   Somnolent, just came up from OR   Skin: Skin is warm. There is erythema (R foot dorsum).   Psychiatric: He has a normal mood and affect. His behavior is normal.   Slightly irritable       Fluids    Intake/Output Summary (Last 24 hours) at 05/10/19 1610  Last data filed at 05/10/19 1400   Gross per 24 hour   Intake              500 ml   Output               10 ml   Net              490 ml       Laboratory  Recent Labs      05/09/19 2000 05/09/19   2353   WBC  9.1  9.0   RBC  4.38*  4.40*   HEMOGLOBIN  14.1  14.2   HEMATOCRIT  42.7  42.9   MCV  97.5  97.5   MCH  32.2  32.3   MCHC  33.0*  33.1*   RDW  46.2  46.1   PLATELETCT  198  189   MPV  8.9*  8.8*     Recent Labs      05/09/19 2000 05/09/19   2353   SODIUM  140  142   POTASSIUM  4.0  3.6   CHLORIDE  103  105   CO2  30  29   GLUCOSE  125*  237*   BUN  18  17   CREATININE  1.03  1.03   CALCIUM  8.8  8.5     Recent Labs      05/09/19   2353  05/10/19   0739   APTT  27.0  52.8*               Imaging  No orders to display        Assessment/Plan  * Toe infection   Assessment & Plan    Planned for toe amputation 5/10 by Dr. Bullock  On hepa gtt bridge (see below)     HLD (hyperlipidemia)   Assessment & Plan    Continue home Lipitor, no complaints of chest pain.     Hypothyroidism   Assessment & Plan    This is chronic and stable, continue home Synthroid.     History of DVT (deep vein thrombosis)- (present on admission)   Assessment & Plan    Continue hep agtt bridge  When cleared by Orthopedics, I.e. No urgent surgery, switch hepa gtt back to  Eliquis     History of pulmonary embolism- (present on admission)   Assessment & Plan    Continue hepa gtt bridge  When cleared by Orthopedics, I.e. No urgent surgery, switch hepa gtt back to [facility]     HTN (hypertension)- (present on admission)   Assessment & Plan    Blood pressure is currently well controlled on home Bumex and atenolol, continue     Factor V Leiden (HCC)   Assessment & Plan    Family reports history of saddle PE and VTs  They are anxious and want to restart anticoagulation as soon as possible.  Hepa gtt restarted          VTE prophylaxis: heparin gtt  Reviewed vitals, labs, imaging, staff notes.  Discussed assessment and plan with Castillo Echevarria Jr. And family  Discussed with RN, JAGDISH.

## 2019-05-10 NOTE — PROGRESS NOTES
· 2 RN skin check complete with EDVIN Gruber.  · Devices in place: SCDs.   · Skin assessed under devices: Yes. Redness and dry skin noted on bilateral shins. Dryness and bumps noted on sacrum and lower back. Redness noted under abdominal flap possibly from belt the patient is wearing, skin is blanching. Scab noted in the mid upper abdomen. Bilateral heels are dry and skin appears flaky. Slight purulent drainage noted from second right toe.     · The following interventions in place: SCDs for DVT prophylaxis, pillows in use for support/positioning and moisturizer for skin.

## 2019-05-10 NOTE — PROGRESS NOTES
"Pharmacy Kinetics 85 y.o. male on vancomycin day # 1 5/10/2019    Currently on Vancomycin 3000 mg iv load, given @2200    Indication for Treatment: SSTI    Pertinent history per medical record: Admitted on 2019 for foot infection.  Pt with PMH of HTN, DLP, & Factor V [place] on chronic anticoagulation presents with erythema and swelling of right foot (right second toe) with s/sx over the past 2 1/2 weeks.  Pt reported to [facility] where imaging showed osteomyelitis.    Other antibiotics:   - Unasyn 3 g iv q6h    Allergies: Patient has no known allergies.     List concerns for renal function:   - Age  - Obesity BMI 39      Pertinent cultures to date:   - None    MRSA nares swab if pneumonia is a concern (ordered/positive/negative/n-a): n/a    Recent Labs      19   2353   WBC  9.1  9.0   NEUTSPOLYS  66.00   --      Recent Labs      19   2353   BUN  18  17   CREATININE  1.03  1.03   ALBUMIN  3.8   --      No results for input(s): VANCOTROUGH, VANCOPEAK, VANCORANDOM in the last 72 hours.  Intake/Output Summary (Last 24 hours) at 05/10/19 0252  Last data filed at 19 2145   Gross per 24 hour   Intake              100 ml   Output                0 ml   Net              100 ml      BP (!) 163/88   Pulse 68   Temp 36.3 °C (97.4 °F) (Temporal)   Resp 18   Ht 1.854 m (6' 1\")   Wt (!) 135.2 kg (298 lb 1 oz)   SpO2 90%  Temp (24hrs), Av.4 °C (97.5 °F), Min:36.3 °C (97.4 °F), Max:36.4 °C (97.5 °F)      A/P   1. Vancomycin dose change: 2000 mg iv q24h, start 5/10@2200  2. Next vancomycin level: Prior to the 3rd dose (not ordered)  3. Goal trough: 12 to 16 mcg/mL  4. Comments: Due to risk of renal accumulation, will start maintenance dose at 15 mg/kg iv q24h with trough to be assessed at steady state.  Pharmacy to follow and to make dose adjustments as appropriate.      Gilberto Lampien, PharmD      "

## 2019-05-10 NOTE — ED NOTES
All labs and cultures have been obtained and sent. NS and Unasyn infusing. Pt resting comfortably- spouse at bedside.

## 2019-05-10 NOTE — ED PROVIDER NOTES
"ED Provider Note    Scribed for Brissa Grossman M.D. by Charis Perkins. 5/9/2019, 6:24 PM.    Primary care provider: Yosi Thornton M.D.  Means of arrival: walk-in  History obtained from: patient  History limited by: none    CHIEF COMPLAINT  Chief Complaint   Patient presents with   • Wound Infection     2nd toe on rt foot is infected and failed outpt tx.  [facility] sent him to [facility] to be admitted for IV ABX and toe amputation                                   HPI  Castillo Echevarria Jr. is a 85 y.o. male who presents to the Emergency Department after visiting the [facility] today for evaluation of an infection on his right foot onset 5 days ago. Since onset, patient's infection has started to move up his right leg. At the clinic, an X-ray was taken which revealed that the patient's right foot has a possible bone infection. He reports that Dr. Bullock (Ortho) would consult with the patient. He has been taking clindamycin 300 mg for the past 5 days at 4 doses per day. Patient has not reported any vomiting or fevers, but does have associated decreased sensation to his right foot. He qualifies himself as a \"borderline diabetic.\"    REVIEW OF SYSTEMS  Pertinent positives include decreased sensation, right foot infection. Pertinent negatives include no vomiting, fevers. All other systems reviewed and negative.     PAST MEDICAL HISTORY   has a past medical history of Anesthesia; Anticoagulation monitoring, special range; Blood clotting disorder (HCC); Cataract; Hemorrhagic disorder (HCC); High cholesterol; History of DVT (deep vein thrombosis) (9/29/2014); History of pulmonary embolism (9/29/2014); History of sleep apnea (9/29/2014); HTN (hypertension) (9/29/2014); Hypercoagulable state (HCC) (9/29/2014); Pulmonary disease; and Sleep apnea.    SURGICAL HISTORY   has a past surgical history that includes cervical laminectomy posterior; other neurological surg (2000); other (2006); other (2007); other orthopedic " "surgery (Right, 2016); and cataract phaco with iol (Right, 1/17/2017).    SOCIAL HISTORY  Social History   Substance Use Topics   • Smoking status: Never Smoker   • Smokeless tobacco: Never Used   • Alcohol use 3.0 oz/week     6 Standard drinks or equivalent per week      Comment: 1 oz      History   Drug Use No       FAMILY HISTORY  Family History   Problem Relation Age of Onset   • Other Mother    • Other Father    • Hypertension Father    • Heart Disease Neg Hx        CURRENT MEDICATIONS  Home Medications     Reviewed by Shila Carvajal R.N. (Registered Nurse) on 05/09/19 at 1704  Med List Status: Partial   Medication Last Dose Status   apixaban (ELIQUIS) 2.5mg Tab  Active   atenolol (TENORMIN) 25 MG TABS  Active   bumetanide (BUMEX) 1 MG Tab  Active   ciprofloxacin (CILOXIN) 0.3 % Solution  Active   clindamycin (CLEOCIN) 300 MG Cap  Active   ketorolac (ACULAR) 0.5 % Solution  Active   levothyroxine (SYNTHROID) 100 MCG TABS  Active   LISINOPRIL-HYDROCHLOROTHIAZIDE PO  Active   prednisoLONE acetate (PRED FORTE) 1 % Suspension  Active   simvastatin (ZOCOR) 10 MG TABS  Active                ALLERGIES  No Known Allergies    PHYSICAL EXAM  VITAL SIGNS: /88   Pulse 60   Temp 36.4 °C (97.5 °F) (Temporal)   Resp 16   Ht 1.854 m (6' 1\")   Wt (!) 135.8 kg (299 lb 6.2 oz)   SpO2 95%   BMI 39.50 kg/m²     Constitutional: Well developed, No acute distress, Non-toxic appearance.   HENT: Normocephalic, Atraumatic, Bilateral external ears normal, Oropharynx moist, No oral exudates, Nose normal.   Eyes: PERRL, EOMI, Conjunctiva normal,  Neck: Normal range of motion, No tenderness, Supple,   Cardiovascular: Good capillary refill, Normal heart rate, Normal rhythm,  Thorax & Lungs: Normal breath sounds, No respiratory distress,  No wheezing, No chest tenderness.   Abdomen: Benign abdominal exam, no guarding no rebound, no masses, no pulsatile mass, no tenderness, no distention  Skin: Erythema that extends to dorsum " of right foot, Warm, Dry,.   Back: No tenderness,    Extremities: Warmth to right foot, Swollen second toe with diffuse whitish yellow color change to toe, no obvious abscess, no ulcers and no drainage of fluid from the toe, intact distal pulses,   Neurologic: Diminished sensation to tips of toes, Alert & oriented x 3, Normal motor function, No focal deficits noted.   Psychiatric: Appropriate                                                       LABS  Results for orders placed or performed during the hospital encounter of 05/09/19   CBC WITH DIFFERENTIAL   Result Value Ref Range    WBC 9.1 4.8 - 10.8 K/uL    RBC 4.38 (L) 4.70 - 6.10 M/uL    Hemoglobin 14.1 14.0 - 18.0 g/dL    Hematocrit 42.7 42.0 - 52.0 %    MCV 97.5 81.4 - 97.8 fL    MCH 32.2 27.0 - 33.0 pg    MCHC 33.0 (L) 33.7 - 35.3 g/dL    RDW 46.2 35.9 - 50.0 fL    Platelet Count 198 164 - 446 K/uL    MPV 8.9 (L) 9.0 - 12.9 fL    Neutrophils-Polys 66.00 44.00 - 72.00 %    Lymphocytes 21.20 (L) 22.00 - 41.00 %    Monocytes 7.80 0.00 - 13.40 %    Eosinophils 3.50 0.00 - 6.90 %    Basophils 0.40 0.00 - 1.80 %    Immature Granulocytes 1.10 (H) 0.00 - 0.90 %    Nucleated RBC 0.00 /100 WBC    Neutrophils (Absolute) 6.02 1.82 - 7.42 K/uL    Lymphs (Absolute) 1.93 1.00 - 4.80 K/uL    Monos (Absolute) 0.71 0.00 - 0.85 K/uL    Eos (Absolute) 0.32 0.00 - 0.51 K/uL    Baso (Absolute) 0.04 0.00 - 0.12 K/uL    Immature Granulocytes (abs) 0.10 0.00 - 0.11 K/uL    NRBC (Absolute) 0.00 K/uL   LACTIC ACID   Result Value Ref Range    Lactic Acid 1.4 0.5 - 2.0 mmol/L     Other labs still pending          COURSE & MEDICAL DECISION MAKING  Nursing notes, VS, PMSFHx reviewed in chart.     6:24 PM Patient seen and examined at bedside. The patient presents with a right foot infection and the differential diagnosis includes but is not limited to cellulitis versus osteomyelitis.  No obvious abscess to drain.  Patient is not tachycardic or hypotensive or febrile.  No obvious signs of  sepsis currently.  Also doubt necrotizing fasciitis.  Patient is currently failing outpatient management.  Ordered for lactic acid, blood culture x2, CBC Diff, CMP, CRP High Sensitive to evaluate. Patient was treated with Unasyn 3 g and IV fluids for his symptoms.    6:52 PM - Paged ortho.     7:01 PM - Consulted with Dr. Gomez (Ortho) who will see the patient.     7:09 PM - Updated patient on the plan. He will be admitted and evaluated by ortho. He verbalized his understanding and agrees to admission.     7:11 PM - Paged hospitalist    7:27 PM - I discussed the patient's case and the above findings with Dr. Irwin (Hospitalist) who agrees to admit the patient.      DISPOSITION:  Patient will be admitted to Dr. Irwin (Hospitalist) in guarded condition.       FINAL IMPRESSION  1. Foot infection          Charis CASH ([place]), am scribing for, and in the presence of, Brissa Grossman M.D..    Electronically signed by: Charis Perkins (Tamara), 5/9/2019    IBrissa M.D. personally performed the services described in this documentation, as scribed by Charis Perkins in my presence, and it is both accurate and complete.    C    The note accurately reflects work and decisions made by me.  Brissa Grossman  5/9/2019  8:16 PM

## 2019-05-10 NOTE — PROGRESS NOTES
Pt seen and examined. Right second toe infection with obvious swelling, drainage and cellulitis. Plan for OR for InD and probable 2nd toe amputation. Risks and benefits were explained to the patient.    Michele Jameson MD  [facility]  Foot and Ankle Fellow

## 2019-05-10 NOTE — CONSULTS
"5/9/2019    Reason for consultation: Right second toe infection    Consultation on Castillo Dustinadiel Echevarria Jr. at the request of Dr. Grossman for an infected right second toe.  The patient is a 85 y.o. male who presents with a right second toe infection.  He states that symptoms began about 3 weeks ago after his right second toenail fell off.  He had progressively increasing pain and swelling of the right second toe as well as increased erythema on the right second toe and forefoot.  This past Saturday he sought care in urgent care and was provided with a prescription for clindamycin.  His symptoms did not improve in fact it worsened and so he sought care at our urgent care today.  Given his worsening presentation, spreading erythema, and the appearance of his right second toe, he was referred to the ER.  He did have an episode of cellulitis in the right leg a while ago treated with antibiotics successfully.  They were evaluated in the ER, and Orthopedics was consulted. Patient denies numbness, paresthesias, fevers, chills, night sweats, or other symptoms.  He does have a history of factor V Leiden disorder and DVTs in both lower extremities as well as a history of PE.  Many of these have been provoked by minor, low risk procedures.  Of note he has no prior history of gout or other regular history of joint pains.    Past Medical History:   Diagnosis Date   • Anesthesia     \" slow to recover\"   • Anticoagulation monitoring, special range    • Blood clotting disorder (HCC)     Factor 5 Leiden   • Cataract    • Hemorrhagic disorder (HCC)     xarelto   • High cholesterol    • History of DVT (deep vein thrombosis) 9/29/2014   • History of pulmonary embolism 9/29/2014   • History of sleep apnea 9/29/2014   • HTN (hypertension) 9/29/2014   • Hypercoagulable state (HCC) 9/29/2014   • Pulmonary disease    • Sleep apnea        Past Surgical History:   Procedure Laterality Date   • CATARACT PHACO WITH IOL Right 1/17/2017    " "Procedure: CATARACT PHACO WITH IOL;  Surgeon: Jero Hinson M.D.;  Location: SURGERY SURGICAL [facility] ORS;  Service:    • OTHER ORTHOPEDIC SURGERY Right 2016    total hip   • OTHER  2007    left eye cataract   • OTHER  2006    eye lid clip   • OTHER NEUROLOGICAL SURG  2000    brain clot   • CERVICAL LAMINECTOMY POSTERIOR         Medications  No current facility-administered medications on file prior to encounter.      Current Outpatient Prescriptions on File Prior to Encounter   Medication Sig Dispense Refill   • clindamycin (CLEOCIN) 300 MG Cap Take 300 mg by mouth 4 times a day.     • atenolol (TENORMIN) 25 MG TABS Take 25 mg by mouth every day.     • levothyroxine (SYNTHROID) 100 MCG TABS Take 100 mcg by mouth every day.         Allergies  Patient has no known allergies.    ROS  Per HPI. All other systems were reviewed and found to be negative    Family History   Problem Relation Age of Onset   • Other Mother    • Other Father    • Hypertension Father    • Heart Disease Neg Hx        Social History     Social History   • Marital status:      Spouse name: N/A   • Number of children: N/A   • Years of education: N/A     Social History Main Topics   • Smoking status: Never Smoker   • Smokeless tobacco: Never Used   • Alcohol use 3.0 oz/week     6 Standard drinks or equivalent per week      Comment: 1 oz   • Drug use: No   • Sexual activity: Not on file     Other Topics Concern   • Not on file     Social History Narrative   • No narrative on file       Physical Exam  Vitals  /88   Pulse (!) 54   Temp 36.4 °C (97.5 °F) (Temporal)   Resp 16   Ht 1.854 m (6' 1\")   Wt (!) 135.8 kg (299 lb 6.2 oz)   SpO2 93%   General: Well Developed, Well Nourished, no acute distress  Psychiatric: Alert and oriented x3, appropriate responses to questions, pleasant mood and affect.  HEENT: Normocephalic, atraumatic  Eyes: Anicteric, PERRLA, EOMI  Neck: Supple, nontender, no masses  Chest: Symmetric expansion of the chest " wall, non-tender to palpation, no distress.  Heart: RRR, palpable peripheral pulses  Abdomen: Soft, NT, ND  Skin: Intact, no open wounds.  Erythema of the right forefoot dorsal midfoot with extension in the leg.  Some skin blanching of the right second toe  Extremities: Tender palpation the area of his erythema although not exquisitely so.  Notable swelling of the right second toe and deformity of the right second toe.  Neuro: Intact light touch sensation in the right foot.  Intact motor function tibialis interior, gastrocsoleus complex, EHL, peroneals on right  Vascular: 2+ dorsalis pedis pulse on the right palpable despite his edema, Capillary refill <2 seconds    Radiographs:  No orders to display       Laboratory Values  Recent Labs      05/09/19 2000   WBC  9.1   RBC  4.38*   HEMOGLOBIN  14.1   HEMATOCRIT  42.7   MCV  97.5   MCH  32.2   MCHC  33.0*   RDW  46.2   PLATELETCT  198   MPV  8.9*     No results for input(s): SODIUM, POTASSIUM, CHLORIDE, CO2, GLUCOSE, BUN, CPKTOTAL in the last 72 hours.          Impression:    #1  Right foot second toe infection concern for osteomyelitis  #2 right lower extremity cellulitis    Plan:    I recommended operative treatment of second toe infection by amputation tomorrow with Dr. Bullock. Risks and benefits of surgery were discussed which include, but are not limited to bleeding, infection, neurovascular damage, need for additional surgery including more proximal amputation, DVT, PE, MI, Stroke and death.  Benefits of surgery discussed included improved chance of infection clearance.  We also discussed therapeutic alternatives to surgery, including non-operative management, which I did not recommend.  They understand these risks and benefits and wish to proceed.      Please keep NPO after midnight pending surgery tomorrow afternoon.  Weightbearing and activity as tolerated right lower extremity affected extremity pending surgery.    With regard to his anticoagulation, I  would recommend that his Eliquis continue to be held.  A heparin drip is okay but should be stopped at 9 AM in preparation for surgery.    For his cellulitis I would recommend continued IV antibiotics with a broader spectrum than was provided outpatient.  Source control of the second toe will likely help him with treatment of the cellulitis.    Please see operative note for detailed post-operative plan, including post-op weightbearing status.

## 2019-05-10 NOTE — ED TRIAGE NOTES
Chief Complaint   Patient presents with   • Wound Infection     2nd toe on rt foot is infected and failed outpt tx.  [facility] sent him to [facility] to be admitted for IV ABX and toe amputation   Patient has factor 5 leiden and wants everyone to know he has hx of multiple DVTs and PE's.

## 2019-05-10 NOTE — PROGRESS NOTES
"Pharmacy Kinetics 85 y.o. male on vancomycin day # 2  5/10/2019    Currently on Vancomycin 2000 mg iv q24hr   (2200)    Indication for Treatment: OM    Pertinent history per medical record: Admitted on 2019 for foot infection/osteomyelitis. Presents with erythema, drainage and swelling of right second toe with s/sx over the past 2 1/2 weeks.  Pt reported to [facility] where imaging showed osteomyelitis. Per ortho - Plan for OR for InD and probable 2nd toe amputation.    Other antibiotics: ampicillin/sulbactam 3g IV q6h    Allergies: Patient has no known allergies.     List concerns for renal function: age, obesity (BMI 39)    Pertinent cultures to date:   19: Blood x2 - NGTD    MRSA nares swab if pneumonia is a concern (ordered/positive/negative/n-a): N/A    Recent Labs      19   2353   WBC  9.1  9.0   NEUTSPOLYS  66.00   --      Recent Labs      19   2353   BUN  18  17   CREATININE  1.03  1.03   ALBUMIN  3.8   --      No results for input(s): VANCOTROUGH, VANCOPEAK, VANCORANDOM in the last 72 hours.  Intake/Output Summary (Last 24 hours) at 05/10/19 1149  Last data filed at 19 2145   Gross per 24 hour   Intake              100 ml   Output                0 ml   Net              100 ml      /65   Pulse (!) 54   Temp 36.1 °C (97 °F) (Temporal)   Resp 17   Ht 1.854 m (6' 1\")   Wt (!) 135.2 kg (298 lb 1 oz)   SpO2 96%  Temp (24hrs), Av.3 °C (97.4 °F), Min:36.1 °C (97 °F), Max:36.4 °C (97.5 °F)      A/P   1. Vancomycin dose change: none  2. Next vancomycin level: ~1-2 days (not ordered)  3. Goal trough: 12-16 mcg/mL  4. Comments: No leukocytosis and afebrile. Ortho has been consulted and will likely do and I&D and obtain cultures to aid in narrowing therapy. Renal function appears stable per renal indices; other concerns for accumulation listed above. Recommend a trough level prior to 3rd dose to assess accumulation and dose " appropriateness. Pharmacy to monitor cultures for possible de-escalation.      Courtney Martin, PharmD., BCPS

## 2019-05-10 NOTE — H&P
"Hospital Medicine History & Physical Note    Date of Service  5/9/2019    Primary Care Physician  Yosi Thornton M.D.    Consultants  Dr. Gomez, orthopedic surgery    Code Status  Full    Chief Complaint  Chief Complaint   Patient presents with   • Wound Infection     2nd toe on rt foot is infected and failed outpt tx.  [facility] sent him to [facility] to be admitted for IV ABX and toe amputation       History of Presenting Illness  85 y.o. male who presented on 5/9/2019 with right foot infection.  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  The patient states that his symptoms began 3-1/2 weeks ago when his right second toe \"came off\".  He felt that it was well and healing for approximately 1 week.  His wife had been applying bandages with topical Neosporin.  However in the last 2-1/2 weeks, his right foot, and specifically his right second toe, has become erythematous, swollen, and his wife who takes care of his feet noted purulent yellow discharge which began on Monday.  He was seen on an outpatient basis and started on oral antibiotic therapy.  He denies any somatic complaints and states that he has had no fevers, chills, nausea, vomiting, headache, chest pain, or shortness of breath.  He went to the renal orthopedic clinic today for follow-up and reports that x-rays were obtained which revealed osteomyelitis and he was sent to the emergency room for continued treatment and evaluation.    Review of Systems  Review of Systems   Constitutional: Negative for chills and fever.   HENT: Negative for congestion and sore throat.    Eyes: Negative for photophobia.   Respiratory: Negative for cough, shortness of breath and wheezing.    Cardiovascular: Negative for chest pain and palpitations.   Gastrointestinal: Negative for abdominal pain, " "diarrhea, nausea and vomiting.   Genitourinary: Negative for dysuria.   Musculoskeletal: Negative for myalgias.   Skin: Negative.         Right second toe infection   Neurological: Negative for dizziness, tingling, focal weakness and headaches.   Psychiatric/Behavioral: Negative for depression and suicidal ideas.       Past Medical History  Past Medical History:   Diagnosis Date   • HTN (hypertension) 9/29/2014   • History of pulmonary embolism 9/29/2014   • History of DVT (deep vein thrombosis) 9/29/2014   • Hypercoagulable state (HCC) 9/29/2014   • History of sleep apnea 9/29/2014   • Anesthesia     \" slow to recover\"   • Anticoagulation monitoring, special range    • Blood clotting disorder (HCC)     Factor 5 Leiden   • Cataract    • Hemorrhagic disorder (HCC)     xarelto   • High cholesterol    • Pulmonary disease    • Sleep apnea        Surgical History  Past Surgical History:   Procedure Laterality Date   • CATARACT PHACO WITH IOL Right 1/17/2017    Procedure: CATARACT PHACO WITH IOL;  Surgeon: Jero Hinson M.D.;  Location: SURGERY SURG[facility];  Service:    • OTHER ORTHOPEDIC SURGERY Right 2016    total hip   • OTHER  2007    left eye cataract   • OTHER  2006    eye lid clip   • OTHER NEUROLOGICAL SURG  2000    brain clot   • CERVICAL LAMINECTOMY POSTERIOR         Family History  Family History   Problem Relation Age of Onset   • Other Mother    • Other Father    • Hypertension Father    • Heart Disease Neg Hx        Social History  Social History   Substance Use Topics   • Smoking status: Never Smoker   • Smokeless tobacco: Never Used   • Alcohol use 3.0 oz/week     6 Standard drinks or equivalent per week      Comment: 1 oz       Allergies  No Known Allergies    Medications  No current facility-administered medications on file prior to encounter.      Current Outpatient Prescriptions on File Prior to Encounter   Medication Sig Dispense Refill   • clindamycin (CLEOCIN) 300 MG Cap Take 300 mg by " mouth 4 times a day.     • atenolol (TENORMIN) 25 MG TABS Take 25 mg by mouth every day.     • levothyroxine (SYNTHROID) 100 MCG TABS Take 100 mcg by mouth every day.         Physical Exam  Hemodynamics  Temp (24hrs), Av.4 °C (97.5 °F), Min:36.4 °C (97.5 °F), Max:36.4 °C (97.5 °F)   Temperature: 36.4 °C (97.5 °F)  Pulse  Av.7  Min: 54  Max: 62    Blood Pressure : 147/88, NIBP: 142/74      Respiratory      Respiration: 16, Pulse Oximetry: 93 %             Physical Exam   Constitutional: He is oriented to person, place, and time. No distress.   Obese   HENT:   Head: Normocephalic and atraumatic.   Right Ear: External ear normal.   Left Ear: External ear normal.   Eyes: EOM are normal. Right eye exhibits no discharge. Left eye exhibits no discharge.   Neck: Neck supple. No JVD present.   Cardiovascular: Normal rate, regular rhythm and normal heart sounds.    Pulmonary/Chest: Effort normal and breath sounds normal. No respiratory distress. He exhibits no tenderness.   Abdominal: Soft. Bowel sounds are normal. He exhibits no distension. There is no tenderness.   Musculoskeletal: He exhibits no edema.   Neurological: He is alert and oriented to person, place, and time. No cranial nerve deficit.   Skin: Skin is dry. He is not diaphoretic. No erythema.   Right second toe swollen, purulent, erythematous, erythema extends up the dorsum of the right foot to the midfoot region.   Psychiatric: He has a normal mood and affect. His behavior is normal.   Nursing note and vitals reviewed.    Capillary refill less than 3 seconds, distal pulses intact    Laboratory:          No results for input(s): ALTSGPT, ASTSGOT, ALKPHOSPHAT, TBILIRUBIN, DBILIRUBIN, GAMMAGT, AMYLASE, LIPASE, ALB, PREALBUMIN, GLUCOSE in the last 72 hours.              No results found for: TROPONINI    Imaging  No results found.      Assessment/Plan:  Anticipate that patient will need greater than 2 midnights for management of the discussed medical  issues.    * Toe infection   Assessment & Plan    The patient reports that he had a x-ray completed at the [place] [facility] and that he was told by his nurse practitioner there was underlying osteomyelitis.  We have attempted to obtain these records however the clinic is now closed for the evening.  At this point, given the appearance of the wound and the duration of his infection, underlying osteomyelitis is likely.  He will be admitted to the hospital and Dr. Gomez of orthopedic surgery has been consulted.  I will start him on IV vancomycin and Zosyn empirically, we will plan to consult infectious disease in the morning.  The patient is afebrile with stable vital signs, inflammatory markers, CBC and BMP ordered in the emergency room are still pending.  I will keep the patient n.p.o. at midnight in the event that orthopedic surgery would like to proceed to the operating room in the morning.     HLD (hyperlipidemia)   Assessment & Plan    Continue home Lipitor, no complaints of chest pain.     Hypothyroidism   Assessment & Plan    This is chronic and stable, continue home Synthroid.     HTN (hypertension)- (present on admission)   Assessment & Plan    Blood pressure is currently well controlled on home Bumex and atenolol, continue     Factor V deficiency (HCC)   Assessment & Plan    Patient reports that he has had multiple blood clots in the past, I am holding his Eliquis in the event that orthopedic surgery needs to proceed to the operating room however I will cover him with weight-based heparin in the meantime.         Prophylaxis: Heparin for DVT prophylaxis, no PPI indicated, bowel protocol as needed

## 2019-05-10 NOTE — ASSESSMENT & PLAN NOTE
Family reports history of saddle PE and VTs  They are anxious and want to restart anticoagulation as soon as possible.  Hepa gtt restarted  On 5/11, Orthopedics cleared ro restart Eliquis  Discussed with Pharmacy. Increased Eliquis dosing compared to home dose given indication

## 2019-05-10 NOTE — PROGRESS NOTES
POST-OP NOTE FOR LIMB PRESERVATION SERVICE    SURGERY DATE: 5/10/2019    PROCEDURE: Procedure(s):  AMPUTATION, TOE - 2nd toe - Wound Class: Dirty or Infected    WEIGHT BEARING STATUS: Weight bearing as tolerated    PT CONSULT: Yes    ANTIBIOTICS: rotuine post op    PLAN TO RETURN TO O.R.: No    WOUND CARE PLAN: Incisional dressing - Change POD #2 (Directions: Adaptic over incision, Gauze, Roll Gauze, Ace Wrap)    FOLLOW-UP: Follow up with  [facility]  in 2 weeks    DURABLE MEDICAL EQUIPMENT: Offloading shoe    OTHER:       Michele Jameson M.D.

## 2019-05-10 NOTE — ANESTHESIA TIME REPORT
Anesthesia Start and Stop Event Times     Date Time Event    5/10/2019 1303 Anesthesia Start     1345 Anesthesia Stop        Responsible Staff  05/10/19    Name Role Begin End    Tobey Gansert, M.D. Anesth 1303 1345        Preop Diagnosis (Free Text):  Pre-op Diagnosis     Right second toe infection         Preop Diagnosis (Codes):  Diagnosis Information     Diagnosis Code(s):         Post op Diagnosis  Toe infection      Premium Reason  Non-Premium    Comments:

## 2019-05-10 NOTE — ASSESSMENT & PLAN NOTE
s/p toe amputation 5/10 by Dr. Bullock  No further surgeries planned, can restart anticoagulation per Orthopedics.  5/10 bone tissue cultures so far NGTD  5/10 bone tissue pathology PENDING  LPS to continue to follow  Called and consulted ID for antibiotic recommendations.   Ordered labs.   On 5/12, wound still looks wet, draining mix of serosanguinous and pus vs tophi.  Explained plan to patient and family at bedside.   On 5/13,   Discussed with Wound Team Orthopedics  Orthopedics do not forsee InDs and surgeries in the future right now  Awaiting final antibiotics recommendations, ID aware  Plan to discharge to [facility]

## 2019-05-11 LAB
ANION GAP SERPL CALC-SCNC: 7 MMOL/L (ref 0–11.9)
APTT PPP: 50.4 SEC (ref 24.7–36)
APTT PPP: 88.3 SEC (ref 24.7–36)
BUN SERPL-MCNC: 14 MG/DL (ref 8–22)
CALCIUM SERPL-MCNC: 8.8 MG/DL (ref 8.5–10.5)
CHLORIDE SERPL-SCNC: 103 MMOL/L (ref 96–112)
CO2 SERPL-SCNC: 30 MMOL/L (ref 20–33)
CREAT SERPL-MCNC: 1.16 MG/DL (ref 0.5–1.4)
CRP SERPL HS-MCNC: 3.12 MG/DL (ref 0–0.75)
ERYTHROCYTE [DISTWIDTH] IN BLOOD BY AUTOMATED COUNT: 45.8 FL (ref 35.9–50)
ERYTHROCYTE [SEDIMENTATION RATE] IN BLOOD BY WESTERGREN METHOD: 21 MM/HOUR (ref 0–20)
GLUCOSE SERPL-MCNC: 137 MG/DL (ref 65–99)
HCT VFR BLD AUTO: 43.5 % (ref 42–52)
HGB BLD-MCNC: 14.4 G/DL (ref 14–18)
MCH RBC QN AUTO: 31.9 PG (ref 27–33)
MCHC RBC AUTO-ENTMCNC: 33.1 G/DL (ref 33.7–35.3)
MCV RBC AUTO: 96.2 FL (ref 81.4–97.8)
PLATELET # BLD AUTO: 209 K/UL (ref 164–446)
PMV BLD AUTO: 9 FL (ref 9–12.9)
POTASSIUM SERPL-SCNC: 3.9 MMOL/L (ref 3.6–5.5)
RBC # BLD AUTO: 4.52 M/UL (ref 4.7–6.1)
SODIUM SERPL-SCNC: 140 MMOL/L (ref 135–145)
VANCOMYCIN TROUGH SERPL-MCNC: 9.5 UG/ML (ref 10–20)
WBC # BLD AUTO: 11.4 K/UL (ref 4.8–10.8)

## 2019-05-11 PROCEDURE — 99233 SBSQ HOSP IP/OBS HIGH 50: CPT | Performed by: INTERNAL MEDICINE

## 2019-05-11 PROCEDURE — 86140 C-REACTIVE PROTEIN: CPT

## 2019-05-11 PROCEDURE — 80048 BASIC METABOLIC PNL TOTAL CA: CPT

## 2019-05-11 PROCEDURE — 97161 PT EVAL LOW COMPLEX 20 MIN: CPT

## 2019-05-11 PROCEDURE — 85652 RBC SED RATE AUTOMATED: CPT

## 2019-05-11 PROCEDURE — 700111 HCHG RX REV CODE 636 W/ 250 OVERRIDE (IP): Performed by: HOSPITALIST

## 2019-05-11 PROCEDURE — 36415 COLL VENOUS BLD VENIPUNCTURE: CPT

## 2019-05-11 PROCEDURE — 99223 1ST HOSP IP/OBS HIGH 75: CPT | Performed by: INTERNAL MEDICINE

## 2019-05-11 PROCEDURE — 700102 HCHG RX REV CODE 250 W/ 637 OVERRIDE(OP): Performed by: INTERNAL MEDICINE

## 2019-05-11 PROCEDURE — 97165 OT EVAL LOW COMPLEX 30 MIN: CPT

## 2019-05-11 PROCEDURE — 85730 THROMBOPLASTIN TIME PARTIAL: CPT

## 2019-05-11 PROCEDURE — 80202 ASSAY OF VANCOMYCIN: CPT

## 2019-05-11 PROCEDURE — 700105 HCHG RX REV CODE 258: Performed by: HOSPITALIST

## 2019-05-11 PROCEDURE — 770001 HCHG ROOM/CARE - MED/SURG/GYN PRIV*

## 2019-05-11 PROCEDURE — 700102 HCHG RX REV CODE 250 W/ 637 OVERRIDE(OP): Performed by: HOSPITALIST

## 2019-05-11 PROCEDURE — 700105 HCHG RX REV CODE 258

## 2019-05-11 PROCEDURE — A9270 NON-COVERED ITEM OR SERVICE: HCPCS | Performed by: HOSPITALIST

## 2019-05-11 PROCEDURE — 85027 COMPLETE CBC AUTOMATED: CPT

## 2019-05-11 PROCEDURE — A9270 NON-COVERED ITEM OR SERVICE: HCPCS | Performed by: INTERNAL MEDICINE

## 2019-05-11 PROCEDURE — 306589 SLEEVE,VASO CALF LARGE: Performed by: INTERNAL MEDICINE

## 2019-05-11 RX ORDER — SODIUM CHLORIDE 9 MG/ML
INJECTION, SOLUTION INTRAVENOUS
Status: COMPLETED
Start: 2019-05-11 | End: 2019-05-11

## 2019-05-11 RX ADMIN — LEVOTHYROXINE SODIUM 100 MCG: 25 TABLET ORAL at 05:27

## 2019-05-11 RX ADMIN — HEPARIN SODIUM 2100 UNITS/HR: 5000 INJECTION, SOLUTION INTRAVENOUS at 02:42

## 2019-05-11 RX ADMIN — AMPICILLIN SODIUM AND SULBACTAM SODIUM 3 G: 2; 1 INJECTION, POWDER, FOR SOLUTION INTRAMUSCULAR; INTRAVENOUS at 02:29

## 2019-05-11 RX ADMIN — AMPICILLIN SODIUM AND SULBACTAM SODIUM 3 G: 2; 1 INJECTION, POWDER, FOR SOLUTION INTRAMUSCULAR; INTRAVENOUS at 13:49

## 2019-05-11 RX ADMIN — AMPICILLIN SODIUM AND SULBACTAM SODIUM 3 G: 2; 1 INJECTION, POWDER, FOR SOLUTION INTRAMUSCULAR; INTRAVENOUS at 08:37

## 2019-05-11 RX ADMIN — ATORVASTATIN CALCIUM 10 MG: 10 TABLET, FILM COATED ORAL at 20:49

## 2019-05-11 RX ADMIN — SODIUM CHLORIDE 500 ML: 9 INJECTION, SOLUTION INTRAVENOUS at 22:55

## 2019-05-11 RX ADMIN — VANCOMYCIN HYDROCHLORIDE 2000 MG: 100 INJECTION, POWDER, LYOPHILIZED, FOR SOLUTION INTRAVENOUS at 22:52

## 2019-05-11 RX ADMIN — HEPARIN SODIUM 4400 UNITS: 1000 INJECTION, SOLUTION INTRAVENOUS; SUBCUTANEOUS at 02:44

## 2019-05-11 RX ADMIN — APIXABAN 5 MG: 5 TABLET, FILM COATED ORAL at 13:49

## 2019-05-11 RX ADMIN — BUMETANIDE 1 MG: 1 TABLET ORAL at 05:27

## 2019-05-11 RX ADMIN — ATENOLOL 25 MG: 50 TABLET ORAL at 05:27

## 2019-05-11 RX ADMIN — AMPICILLIN SODIUM AND SULBACTAM SODIUM 3 G: 2; 1 INJECTION, POWDER, FOR SOLUTION INTRAMUSCULAR; INTRAVENOUS at 20:49

## 2019-05-11 ASSESSMENT — COGNITIVE AND FUNCTIONAL STATUS - GENERAL
MOBILITY SCORE: 22
CLIMB 3 TO 5 STEPS WITH RAILING: A LITTLE
DAILY ACTIVITIY SCORE: 22
MOVING TO AND FROM BED TO CHAIR: A LITTLE
SUGGESTED CMS G CODE MODIFIER DAILY ACTIVITY: CJ
HELP NEEDED FOR BATHING: A LITTLE
SUGGESTED CMS G CODE MODIFIER MOBILITY: CJ
DRESSING REGULAR LOWER BODY CLOTHING: A LITTLE

## 2019-05-11 ASSESSMENT — ENCOUNTER SYMPTOMS
SPUTUM PRODUCTION: 0
VOMITING: 0
MYALGIAS: 1
NERVOUS/ANXIOUS: 0
CONSTIPATION: 0
BLURRED VISION: 0
NAUSEA: 0
DIARRHEA: 0
ABDOMINAL PAIN: 0
CHILLS: 0
HEADACHES: 0
SHORTNESS OF BREATH: 0
COUGH: 0
DOUBLE VISION: 0
FEVER: 0

## 2019-05-11 ASSESSMENT — GAIT ASSESSMENTS
DEVIATION: DECREASED HEEL STRIKE;DECREASED TOE OFF;BRADYKINETIC
GAIT LEVEL OF ASSIST: STAND BY ASSIST
ASSISTIVE DEVICE: SINGLE POINT CANE
DISTANCE (FEET): 125

## 2019-05-11 ASSESSMENT — ACTIVITIES OF DAILY LIVING (ADL): TOILETING: INDEPENDENT

## 2019-05-11 NOTE — OP REPORT
DATE OF SERVICE:  05/10/2019    PREOPERATIVE DIAGNOSES:  1.  Right second toe infection.  2.  Right foot cellulitis.    POSTOPERATIVE DIAGNOSES:  1.  Right second toe infection.  2.  Right foot cellulitis.    PROCEDURES PERFORMED:  1.  Right second toe amputation of the metatarsophalangeal joint.  2.  Right foot irrigation and debridement, dorsal and plantar compartments.    SURGEON:  Wood Bullock MD    ASSISTANT:  None.    ANESTHESIA:  General endotracheal with local.    ESTIMATED BLOOD LOSS:  None.    COMPLICATIONS:  None.    SPECIMENS:  1.  Right second toe culture.  2.  Right second toe pathology.    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    INDICATIONS:  Please see consult note.    PROCEDURE IN DETAIL:  The patient was brought to the operating room.  He   underwent general endotracheal anesthesia without complications.  His right   lower extremity was prepped and draped in standard fashion in the supine   position with all appropriate padding.  Positive site verification confirmed   his right lower extremity as well as above procedure and confirmation that he   received preoperative antibiotics.  Esmarch was used to exsanguinate his foot   and ankle, and leg tourniquet was inflated to 250 mmHg.  An elliptical   incision was made at the base of second toe where it was brought directly down   to bone on the dorsal and plantar surface.  It was brought to the level of   the MTPJ where disarticulation was performed.  The toe was then amputated and   sent off to the back table.  I opened the periphery around the distal aspect   of the distal phalanx.  At that time a rongeur was used to rongeur out some   distal phalanx for culture.  A second rongeur bite was used for pathology.    There was substance very consistent with tophus that was noted.  Next, using a   15 blade, sharp excisional debridement down to the level of bone was   performed around the periphery down to good  viable tissue.  The wound is   approximately 2 cm in length.  Tourniquet was released.  Hemostasis was   obtained.  Thorough irrigation was performed.  A 500 mg of vancomycin powder   was placed into the wound.  The wound was meticulously closed with interrupted   nylon suture.  Sterile dressings were applied.  He is transferred to recovery   room in good condition.       ____________________________________     MD COLE FRANKLIN / ZACKERY    DD:  05/10/2019 15:36:09  DT:  05/10/2019 19:15:51    D#:  [id number]  Job#:  [id number]    cc: [facility]

## 2019-05-11 NOTE — CONSULTS
Consults   INFECTIOUS DISEASES INPATIENT CONSULT NOTE     Date of Service: 5/11/2019    Consult Requested By: Ulisses Abreu M.D.    Reason for Consultation: Right Foot infection     History of Present Illness:   Castillo Echevarria Jr. is a 85 y.o.  admitted 5/9/2019. Pt has a past medical history of borderline diabetes, hypertension, hypothyroidism, hyperlipidemia, right hip replacement and factor V Leyden disease with history of pulmonary emboli and DVT.  He reports several weeks of worsening symptoms since toenail came off the right second toe.  He was seen by podiatry and started on clindamycin.  Despite antibiotics for approximately 5 days the toe continued to have increased swelling, erythema and then purulent discharge.  He was seen in urgent care and orthopedic clinic in the due to concern for OM  from outpatient x-rays was sent to the emergency room.     Hospital Course:   He is been afebrile, vitals within normal limits.  No leukocytosis.  No ESR, cardiac CPR of 43.  Blood cultures obtained and no growth.  He is taking the OR on 5/9 with right second toe amputation at the MTP joint and right foot I&D.  He was initially started on Unasyn and vancomycin.  He was given a dose of cefazolin for surgery and has been continued on Unasyn.     Today he is doing well with no pain, he is tolerating antibiotics with no nausea or diarrhea.     Review Of Systems:  Review of Systems   Constitutional: Negative for chills, fever and malaise/fatigue.   HENT: Negative for hearing loss.    Eyes: Negative for blurred vision and double vision.   Respiratory: Negative for cough, sputum production and shortness of breath.    Cardiovascular: Negative for chest pain.   Gastrointestinal: Negative for abdominal pain, constipation, diarrhea, nausea and vomiting.   Genitourinary: Negative for dysuria.   Musculoskeletal: Positive for joint pain and myalgias.   Skin: Negative for rash.   Neurological: Negative for headaches.  "  Psychiatric/Behavioral: The patient is not nervous/anxious.          PMH:   Past Medical History:   Diagnosis Date   • Anesthesia     \" slow to recover\"   • Anticoagulation monitoring, special range    • Blood clotting disorder (HCC)     Factor 5 Leiden   • Cataract    • Hemorrhagic disorder (HCC)     xarelto   • High cholesterol    • History of DVT (deep vein thrombosis) 9/29/2014   • History of pulmonary embolism 9/29/2014   • History of sleep apnea 9/29/2014   • HTN (hypertension) 9/29/2014   • Hypercoagulable state (HCC) 9/29/2014   • Pulmonary disease    • Sleep apnea        PSH:  Past Surgical History:   Procedure Laterality Date   • TOE AMPUTATION Right 5/10/2019    Procedure: AMPUTATION, TOE - 2nd toe;  Surgeon: Wood Bulolck M.D.;  Location: SURGERY [facility];  Service: Orthopedics   • CATARACT PHACO WITH IOL Right 1/17/2017    Procedure: CATARACT PHACO WITH IOL;  Surgeon: Jero Hinson M.D.;  Location: SURGERY [facility];  Service:    • OTHER ORTHOPEDIC SURGERY Right 2016    total hip   • OTHER  2007    left eye cataract   • OTHER  2006    eye lid clip   • OTHER NEUROLOGICAL SURG  2000    brain clot   • CERVICAL LAMINECTOMY POSTERIOR         FAMILY HX:  Family History   Problem Relation Age of Onset   • Other Mother    • Other Father    • Hypertension Father    • Heart Disease Neg Hx        SOCIAL HX:  Social History     Social History   • Marital status:      Spouse name: N/A   • Number of children: N/A   • Years of education: N/A     Occupational History   • Not on file.     Social History Main Topics   • Smoking status: Never Smoker   • Smokeless tobacco: Never Used   • Alcohol use 3.0 oz/week     6 Standard drinks or equivalent per week      Comment: 1 oz   • Drug use: No   • Sexual activity: Not on file     Other Topics Concern   • Not on file     Social History Narrative   • No narrative on file     History   Smoking Status   • Never Smoker   Smokeless Tobacco   • Never " "Used     History   Alcohol Use   • 3.0 oz/week   • 6 Standard drinks or equivalent per week     Comment: 1 oz       Allergies/Intolerances:  No Known Allergies    History reviewed with the patient     Other Current Medications:    Current Facility-Administered Medications:   •  apixaban (ELIQUIS) tablet 5 mg, 5 mg, Oral, BID, Ulisses Abreu M.D., 5 mg at 19 1349  •  vancomycin 2,000 mg in  mL IVPB, 15 mg/kg, Intravenous, Q24HR, Cassie Irwin M.D., Stopped at 19 0109  •  levothyroxine (SYNTHROID) tablet 100 mcg, 100 mcg, Oral, DAILY, Cassie Irwin M.D., 100 mcg at 19  •  bumetanide (BUMEX) tablet 1 mg, 1 mg, Oral, DAILY, Cassie Irwin M.D., 1 mg at 19  •  atorvastatin (LIPITOR) tablet 10 mg, 10 mg, Oral, Nightly, Cassie Irwin M.D., 10 mg at 05/10/19 2030  •  atenolol (TENORMIN) tablet 25 mg, 25 mg, Oral, DAILY, Cassie Irwin M.D., 25 mg at 19  •  senna-docusate (PERICOLACE or SENOKOT S) 8.6-50 MG per tablet 2 Tab, 2 Tab, Oral, BID, Stopped at 05/10/19 0600 **AND** polyethylene glycol/lytes (MIRALAX) PACKET 1 Packet, 1 Packet, Oral, QDAY PRN **AND** magnesium hydroxide (MILK OF MAGNESIA) suspension 30 mL, 30 mL, Oral, QDAY PRN **AND** bisacodyl (DULCOLAX) suppository 10 mg, 10 mg, Rectal, QDAY PRN, Cassie Irwin M.D.  •  acetaminophen (TYLENOL) tablet 650 mg, 650 mg, Oral, Q6HRS PRN, Cassie Irwin M.D.  •  ampicillin/sulbactam (UNASYN) 3 g in  mL IVPB, 3 g, Intravenous, Q6HRS, Cassie Irwin M.D., Last Rate: 200 mL/hr at 19 1349, 3 g at 19 1349  •  MD Alert...Vancomycin per Pharmacy, 1 Each, Other, PHARMACY TO DOSE, Cassie Irwin M.D.  [unfilled]    Most Recent Vital Signs:  /87   Pulse 64   Temp 36.1 °C (97 °F) (Temporal)   Resp 16   Ht 1.854 m (6' 1\")   Wt (!) 136.3 kg (300 lb 7.8 oz)   SpO2 92%   BMI 39.64 kg/m²   Temp  Av.3 °C (97.3 °F)  Min: 35.9 °C (96.7 °F)  Max: 36.7 °C (98 °F)    Physical " Exam:  Physical Exam   Constitutional: He is oriented to person, place, and time and well-developed, well-nourished, and in no distress.   HENT:   Head: Normocephalic and atraumatic.   Eyes: Pupils are equal, round, and reactive to light. Conjunctivae and EOM are normal.   Cardiovascular: Normal rate, regular rhythm and normal heart sounds.    Pulmonary/Chest: Effort normal and breath sounds normal.   Abdominal: Soft. Bowel sounds are normal. He exhibits no distension. There is no tenderness. There is no rebound and no guarding.   Musculoskeletal: He exhibits edema.   Right foot bandaged, images reviewed   Neurological: He is alert and oriented to person, place, and time.   Skin: Skin is warm and dry.   Psychiatric: Affect and judgment normal.         Pertinent Lab Results:  Recent Labs      05/09/19 2000 05/09/19 2353   WBC  9.1  9.0      Recent Labs      05/09/19 2000 05/09/19 2353   HEMOGLOBIN  14.1  14.2   HEMATOCRIT  42.7  42.9   MCV  97.5  97.5   MCH  32.2  32.3   PLATELETCT  198  189         Recent Labs      05/09/19 2000 05/09/19 2353   SODIUM  140  142   POTASSIUM  4.0  3.6   CHLORIDE  103  105   CO2  30  29   CREATININE  1.03  1.03        Recent Labs      05/09/19 2000   ALBUMIN  3.8        Pertinent Micro:  Results     Procedure Component Value Units Date/Time    Anaerobic Culture [[id number]] Collected:  05/10/19 1326    Order Status:  Completed Specimen:  Bone Updated:  05/11/19 1346     Significant Indicator NEG     Source BONE     Site Right 2nd Toe     Culture Result Culture in progress.    Narrative:       Surgery Specimen    CULTURE TISSUE W/ GRM STAIN [[id number]] Collected:  05/10/19 1326    Order Status:  Completed Specimen:  Bone Updated:  05/11/19 1346     Significant Indicator NEG     Source BONE     Site Right 2nd Toe     Culture Result No growth at 24 hours.     Gram Stain Result Few WBCs.  No organisms seen.      Narrative:       Surgery Specimen    GRAM STAIN [[id number]]  "Collected:  05/10/19 1326    Order Status:  Completed Specimen:  Bone Updated:  05/10/19 2131     Significant Indicator .     Source BONE     Site Right 2nd Toe     Gram Stain Result Few WBCs.  No organisms seen.      Narrative:       Surgery Specimen    BLOOD CULTURE x2 [[id number]] Collected:  05/09/19 1958    Order Status:  Completed Specimen:  Blood from Peripheral Updated:  05/10/19 0958     Significant Indicator NEG     Source BLD     Site PERIPHERAL     Culture Result No Growth  Note: Blood cultures are incubated for 5 days and  are monitored continuously.Positive blood cultures  are called to the RN and reported as soon as  they are identified.      Narrative:       Per Hospital Policy: Only change Specimen Src: to \"Line\" if  specified by physician order.  Left AC    BLOOD CULTURE x2 [[id number]] Collected:  05/09/19 2000    Order Status:  Completed Specimen:  Blood from Peripheral Updated:  05/10/19 0957     Significant Indicator NEG     Source BLD     Site PERIPHERAL     Culture Result No Growth  Note: Blood cultures are incubated for 5 days and  are monitored continuously.Positive blood cultures  are called to the RN and reported as soon as  they are identified.      Narrative:       Per Hospital Policy: Only change Specimen Src: to \"Line\" if  specified by physician order.  Right Forearm/Arm        No results found for: BLOODCULTU, BLDCULT, BCHOLD     Studies:  No results found.    ASSESSMENT/PLAN:     Castillo Choadiel Echevarria Jr. is a 85 y.o.  admitted 5/9/2019. Pt has a past medical history of borderline diabetes, hypertension, hypothyroidism, hyperlipidemia, right hip replacement and factor V Leyden disease with history of pulmonary emboli and DVT.  He reports several weeks of worsening symptoms since toenail came off the right second toe.  He was seen by podiatry and started on clindamycin.  Despite antibiotics for approximately 5 days the toe continued to have increased swelling, erythema and then " purulent discharge.  He was seen in urgent care and orthopedic clinic in the due to concern for OM  from outpatient x-rays was sent to the emergency room.     Hospital Course:   He is been afebrile, vitals within normal limits.  No leukocytosis.  No ESR, cardiac CPR of 43.  Blood cultures obtained and no growth.  He is taking the OR on 5/9 with right second toe amputation at the MTP joint and right foot I&D.  He was initially started on Unasyn and vancomycin.  He was given a dose of cefazolin for surgery.     Right foot cellulitis   Right second toe cellulitis and osteomyelitis per outside imaging  -Worsened on clindamycin  -Status post right toe amputation and MTP joint on 5/9    ----Continue vancomycin and Unasyn while awaiting cultures  ----Follow-up or cultures  ----Follow-up pathology-if margins clear will be able to significantly reduce antibiotic duration  ----No PICC line yet -may have decreased duration or oral options  ----Follow labs particularly renal function in this elderly gentleman on vancomycin  --- Patient has been on clindamycin and currently on IV antibiotics, monitor for symptoms of C. Difficile such as frequens loose stools    Factor V Leiden disease and history of DVTs and PEs  -Monitor for symptoms of emboli  -Patient on apixaban          Plan of care discussed with JAX Abreu M.D.. Will continue to follow    Basia Andrade M.D.

## 2019-05-11 NOTE — PROGRESS NOTES
[facility] Medicine Daily Progress Note    Date of Service  5/11/2019    Chief Complaint  85 y.o. male admitted 5/9/2019 with Wound Infection (2nd toe on rt foot is infected and failed outpt tx.  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  Presented with synptoms that began 3 weeks ago after his right second toenail fell off where he had progressively increasing pain and swelling of the right second toe as well as increased erythema on the right second toe and forefoot. He was seen at urgent care and was provided with a prescription for clindamycin.  His symptoms worsened with purulent discharge and streaking erythema per Wife last Monday, so he returned Urgent Care who then referred them to the ED. On evaluation there was concern for osteomyelitis of the L 2nd toe. Dr. Gomez, Orthoprdics LPS is consulted. S/p L 2nd toe amputation by Dr. Bullock 5/10. Eliquis restarted.      Interval Problem Update  5/10. Underwent L 2nd toe amputation. Tolerated procedure. Sleeping but he seems irritable when I tried to wake him up. His family at bedside. Family reports he is on anticoagulation and anxious as they want it restarted.  5/11. Tolerated procedure. Minimal discomfort.     Consultants/Specialty  Orthopedics    Code Status  full    Disposition  PT/OT pending  Offer [facility]    Review of Systems  Review of Systems   Unable to perform ROS: Mental acuity    Sleeping, did not want to be disturbed    Physical Exam  Temp:  [36.1 °C (96.9 °F)-36.7 °C (98 °F)] 36.1 °C (97 °F)  Pulse:  [55-65] 64  Resp:  [16] 16  BP: (102-157)/() 157/87  SpO2:  [91 %-99 %] 92 %    Physical Exam   Constitutional: He appears well-developed.   Elderly   HENT:   Head: Normocephalic and atraumatic.   Eyes: Conjunctivae are normal. No scleral icterus.   Neck: Normal range of motion. Neck  supple.   Cardiovascular: Normal rate and regular rhythm.  Exam reveals no gallop and no friction rub.    No murmur heard.  Pulmonary/Chest: Effort normal and breath sounds normal. No respiratory distress. He has no wheezes. He has no rales.   Abdominal: Soft. Bowel sounds are normal. He exhibits no distension. There is no tenderness. There is no rebound and no guarding.   Musculoskeletal: He exhibits tenderness (L foot, minimal) and deformity (L toe amputation). He exhibits no edema.   L foot dressing, CDI   Neurological: He is alert.   Somnolent, just came up from OR   Skin: Skin is warm. There is erythema (R foot dorsum).   Psychiatric: He has a normal mood and affect. His behavior is normal.   Slightly irritable     5/11:            Fluids  No intake or output data in the 24 hours ending 05/11/19 1446    Laboratory  Recent Labs      05/09/19 2000 05/09/19   2353   WBC  9.1  9.0   RBC  4.38*  4.40*   HEMOGLOBIN  14.1  14.2   HEMATOCRIT  42.7  42.9   MCV  97.5  97.5   MCH  32.2  32.3   MCHC  33.0*  33.1*   RDW  46.2  46.1   PLATELETCT  198  189   MPV  8.9*  8.8*     Recent Labs      05/09/19 2000 05/09/19   2353   SODIUM  140  142   POTASSIUM  4.0  3.6   CHLORIDE  103  105   CO2  30  29   GLUCOSE  125*  237*   BUN  18  17   CREATININE  1.03  1.03   CALCIUM  8.8  8.5     Recent Labs      05/10/19   1629  05/10/19   2352  05/11/19   0538   APTT  28.3  50.4*  88.3*               Imaging  No orders to display        Assessment/Plan  * Toe infection   Assessment & Plan    s/p toe amputation 5/10 by Dr. Bullock  No further surgeries planned, can restart anticoagulation per Orthopedics.  5/10 bone tissue cultures so far NGTD  5/10 bone tissue pathology PENDING  LPS to continue to follow  Called and consulted ID for antibiotic recommendations.   Ordered labs.      HLD (hyperlipidemia)   Assessment & Plan    Continue home Lipitor, no complaints of chest pain.     Hypothyroidism   Assessment & Plan    This is chronic  and stable, continue home Synthroid.     History of DVT (deep vein thrombosis)- (present on admission)   Assessment & Plan    Continue hep agtt bridge  On 5/11, Orthopedics cleared ro restart Eliquis  Discussed with Pharmacy. Increased Eliquis dosing compared to home dose given indication     History of pulmonary embolism- (present on admission)   Assessment & Plan    Continue hepa gtt bridge  On 5/11, Orthopedics cleared ro restart Eliquis  Discussed with Pharmacy. Increased Eliquis dosing compared to home dose given indication     HTN (hypertension)- (present on admission)   Assessment & Plan    Blood pressure is currently well controlled on home Bumex and atenolol, continue     Factor V Leiden (HCC)   Assessment & Plan    Family reports history of saddle PE and VTs  They are anxious and want to restart anticoagulation as soon as possible.  Hepa gtt restarted  On 5/11, Orthopedics cleared ro restart Eliquis  Discussed with Pharmacy. Increased Eliquis dosing compared to home dose given indication          VTE prophylaxis: heparin gtt  Reviewed vitals, labs, imaging, staff notes.  Discussed assessment and plan with Castillo Echevarria Jr. And family  Discussed with RN, JAGDISH.

## 2019-05-11 NOTE — PROGRESS NOTES
"LIMB PRESERVATION SERVICE INITIAL CONSULT  - RN NOTE    REASON FOR LPS CONSULTATION: S/P 1.  Right second toe amputation of the metatarsophalangeal joint and I and D with Dr Bullock 5/10/19    Past medical history, medicines, allergies, family history, social history,    reviewed    History of Present Illness:     Castillo Echevarria Jr. is a 85 y.o. male, with a past medical history that includes Factor 5 disorder on xarelto, Hx of DVT, HTN, Pulmonary Disease, hypothyroidism, hyperlipidemia,  admitted 5/9/2019 for Foot infection for the LISS,  LPS has been consulted for after Sx care of wound.  This wound started from a Second toe nail Sx removal that became infected the beginning of April 2019.  Pt has been treating the wound with neosporin.   The wound has failed to improve.   Pt is not diabetic but states being borderline.  They do have numbness in her feet.     5/11/19. Told pt would return tomorrow for dressing change on POD #2 as planned. Pt insisting on dressing change today because of anticoagulation becoming problem and pt stating they were left on heparin only because they were waiting for a wound site evaluation today because that was what they were told. Educated pt how wound needed to have two day to clot especially with heparin being administered. Pt stated the dressing was already removed, fell off, and redressed. Agreed to evaluate site to make sure dressing was reapplied appropriately again.     Tobacco Abuse Hx:   reports that he has never smoked. He has never used smokeless tobacco.    Alcohol Abuse Hx:   reports that he drinks about 3.0 oz of alcohol per week .    Drug Abuse Hx:   reports that he does not use drugs.    PAST MEDICAL HISTORY:   Past Medical History:   Diagnosis Date   • Anesthesia     \" slow to recover\"   • Anticoagulation monitoring, special range    • Blood clotting disorder (HCC)     Factor 5 Leiden   • Cataract    • Hemorrhagic disorder (HCC)     xarelto   • High cholesterol  "   • History of DVT (deep vein thrombosis) 9/29/2014   • History of pulmonary embolism 9/29/2014   • History of sleep apnea 9/29/2014   • HTN (hypertension) 9/29/2014   • Hypercoagulable state (HCC) 9/29/2014   • Pulmonary disease    • Sleep apnea        MEDICATIONS:   Current Facility-Administered Medications   Medication Dose   • vancomycin 2,000 mg in  mL IVPB  15 mg/kg   • levothyroxine (SYNTHROID) tablet 100 mcg  100 mcg   • bumetanide (BUMEX) tablet 1 mg  1 mg   • atorvastatin (LIPITOR) tablet 10 mg  10 mg   • atenolol (TENORMIN) tablet 25 mg  25 mg   • senna-docusate (PERICOLACE or SENOKOT S) 8.6-50 MG per tablet 2 Tab  2 Tab    And   • polyethylene glycol/lytes (MIRALAX) PACKET 1 Packet  1 Packet    And   • magnesium hydroxide (MILK OF MAGNESIA) suspension 30 mL  30 mL    And   • bisacodyl (DULCOLAX) suppository 10 mg  10 mg   • acetaminophen (TYLENOL) tablet 650 mg  650 mg   • ampicillin/sulbactam (UNASYN) 3 g in  mL IVPB  3 g   • MD Alert...Vancomycin per Pharmacy  1 Each   • heparin injection 4,400 Units  4,400 Units    And   • heparin infusion 25,000 units in 500 ml 0.45% nacl         ALLERGIES:  No Known Allergies     PAST SURGICAL HISTORY:   Past Surgical History:   Procedure Laterality Date   • TOE AMPUTATION Right 5/10/2019    Procedure: AMPUTATION, TOE - 2nd toe;  Surgeon: Wood Bullock M.D.;  Location: SURGERY [facility] ORS;  Service: Orthopedics   • CATARACT PHACO WITH IOL Right 1/17/2017    Procedure: CATARACT PHACO WITH IOL;  Surgeon: Jero Hinson M.D.;  Location: SURGERY SURGICAL [facility] [place] ORS;  Service:    • OTHER ORTHOPEDIC SURGERY Right 2016    total hip   • OTHER  2007    left eye cataract   • OTHER  2006    eye lid clip   • OTHER NEUROLOGICAL SURG  2000    brain clot   • CERVICAL LAMINECTOMY POSTERIOR         SOCIAL HISTORY:   Social History     Social History   • Marital status:      Spouse name: N/A   • Number of children: N/A   • Years of education: N/A     Social  History Main Topics   • Smoking status: Never Smoker   • Smokeless tobacco: Never Used   • Alcohol use 3.0 oz/week     6 Standard drinks or equivalent per week      Comment: 1 oz   • Drug use: No   • Sexual activity: Not on file     Other Topics Concern   • Not on file     Social History Narrative   • No narrative on file        FAMILY HISTORY:   Family History   Problem Relation Age of Onset   • Other Mother    • Other Father    • Hypertension Father    • Heart Disease Neg Hx        DIAGNOSTIC DATA:       Xray:  NA   MRI: NA             CT: NA             Vascular: Na     Arterial studies:  Pedal Pulses Palpable    Labs                                      ESR: na                     CRP: NA           A1c: No results found for: HBA1C              Lab Results   Component Value Date/Time    GLUCOSE 237 (H) 05/09/2019 11:53 PM          Microbiology:   WBC   Date/Time Value Ref Range Status   05/09/2019 11:53 PM 9.0 4.8 - 10.8 K/uL Final     Results     Procedure Component Value Units Date/Time    CULTURE TISSUE W/ GRM STAIN [[id number]] Collected:  05/10/19 1326    Order Status:  Completed Specimen:  Bone Updated:  05/11/19 1137     Significant Indicator NEG     Source BONE     Site Right 2nd Toe     Culture Result No growth at 24 hours.     Gram Stain Result Few WBCs.  No organisms seen.      Narrative:       Surgery Specimen    GRAM STAIN [[id number]] Collected:  05/10/19 1326    Order Status:  Completed Specimen:  Bone Updated:  05/10/19 2131     Significant Indicator .     Source BONE     Site Right 2nd Toe     Gram Stain Result Few WBCs.  No organisms seen.      Narrative:       Surgery Specimen    Anaerobic Culture [[id number]] Collected:  05/10/19 1326    Order Status:  Completed Specimen:  Other Updated:  05/10/19 1442    BLOOD CULTURE x2 [[id number]] Collected:  05/09/19 1958    Order Status:  Completed Specimen:  Blood from Peripheral Updated:  05/10/19 0958     Significant Indicator NEG     Source BLD     Site  "PERIPHERAL     Culture Result No Growth  Note: Blood cultures are incubated for 5 days and  are monitored continuously.Positive blood cultures  are called to the RN and reported as soon as  they are identified.      Narrative:       Per Hospital Policy: Only change Specimen Src: to \"Line\" if  specified by physician order.  Left AC    BLOOD CULTURE x2 [[id number]] Collected:  05/09/19 2000    Order Status:  Completed Specimen:  Blood from Peripheral Updated:  05/10/19 0957     Significant Indicator NEG     Source BLD     Site PERIPHERAL     Culture Result No Growth  Note: Blood cultures are incubated for 5 days and  are monitored continuously.Positive blood cultures  are called to the RN and reported as soon as  they are identified.      Narrative:       Per Hospital Policy: Only change Specimen Src: to \"Line\" if  specified by physician order.  Right Forearm/Arm             PHYSICAL EXAMINATION:     Vitals  /87   Pulse 64   Temp 36.1 °C (97 °F) (Temporal)   Resp 16   Ht 1.854 m (6' 1\")   Wt (!) 136.3 kg (300 lb 7.8 oz)   SpO2 92%   BMI 39.64 kg/m²      General Appearance:  Well developed, obese, in no acute distress    Vascular Assessment:    Edema +1 Bilat Baseline    Pulses: RLE- DP pulse +1 palpable; PT pulse + 1 palpable                LLE- DP pulse +1 palpable; PT pulse +1  palpable     Sensory Assessment  Monofilament testing  NA  Feet Insensate to light touch    Wound Assessment:             Incision 05/10/19 (Active)   Wound Image   5/11/2019 10:30 AM   Site Assessment Red 5/11/2019 10:30 AM   Ruthann-wound Assessment Maceration;Purple;Edema;Dark edges 5/11/2019 10:30 AM   Margins Attached edges 5/11/2019 10:30 AM   Closure Approximated;Sutures 5/11/2019 10:30 AM   Drainage Amount Scant 5/11/2019 10:30 AM   Drainage Description Serosanguineous 5/11/2019 10:30 AM   Treatments Cleansed;Site care 5/11/2019 10:30 AM   Periwound Protectant Skin Protectant wipes to Periwound 5/11/2019 10:30 AM   Dressing " Options Hydrofiber Silver;Hypafix Tape;Nonadhesive Foam;Roll Gauze;Tubigrip 5/11/2019 10:30 AM   Dressing Changed New 5/11/2019 10:30 AM   Dressing Status Clean;Dry;Intact 5/11/2019 10:30 AM   Dressing Change Frequency Daily 5/11/2019 10:30 AM   NEXT Dressing Change  05/12/19 5/11/2019 10:30 AM   NEXT Weekly Photo (Inpatient Only) 05/18/19 5/11/2019 10:30 AM   WOUND NURSE ONLY - Odor None 5/11/2019 10:30 AM   WOUND NURSE ONLY - Pulses Right;1+;DP 5/11/2019 10:30 AM   WOUND NURSE ONLY - Exposed Structures None 5/11/2019 10:30 AM   WOUND NURSE ONLY - Tissue Type and Percentage 100% Red 5/11/2019 10:30 AM   WOUND NURSE ONLY - Time Spent with Patient (mins) 60 5/11/2019 10:30 AM             Procedures:       Debridement :  Na   Cleansed with:     NS                                                                     Periwound protected with: skin prep   Primary dressing: AQAG   Secondary Dressing: foam   Other:  Tubi  E    NURSING TO CHANGE RIGHT 2ND TOE SURGICAL SITE DRESSING EVERY DAY AND PRN FOR SATURATION OR DISLODGEMENT  Nursing to remove previous dressings, aquacel may appear gel like, Pat periwound dry.  Apply skin prep/No Sting to periwound.  Let air dry for 1-2 minutes. Apply a piece of AqAg (Hydrofiber Silver), cut to size (larger than wound beds), to the suture sites. Cover with Non Adhesive Foam and secure with Roll Gauze.  Then apply Tubigrip to secure dressings in place.   Please take Weekly Wound Photos. Notify wound team if wound deteriorates or fails to progress.    Patient Education:    Implications of loss of protective sensation (LOPS) discussed with patient- including increased risk for amputation.  Advised to check foot at least daily, moisturize foot, and to always wear protective foot wear.      Plan:        1. Wound Care:   RIGHT 2nd TOE - Site Macerated - Likely due to high level of output at site and anticoagulation. Wound and periwound will be dried with wound care. Will continue to  monitor. D/W Dr Bullock and Dr Abreu. Dr. Bullock will see pt Monday and pt will return to Sx if needed.   Dressing Orders Updated.                Nursing to change every day and PRN for Saturation or Dislodgement    2. Offloading:  Offloading shoe shoe at bedside         3. Infection Management:    Infection: patho pending   Microbiology patho pending   Antibiotics: Continue current ABX consider ID if patho comes back with Osteomyelitis vs Gout    7. Referrals:   Vascular NA   Ortho Dr Bullock is following   Infectious diseases consider contacting / D/W Dr Abreu   Diabetes Education NA   Ortho tech Na   Other NA      Professional collaboration: Dr Bullock, Dr Abreu, Bedside RN    LPS will follow    Joe Enrique R.N.

## 2019-05-11 NOTE — THERAPY
"Occupational Therapy Evaluation completed.   Functional Status:  Pt presents to skilled OT services following Rt 2nd toe amputation of the metatarsophalangeal joint and I&D, WBAT w/ offloading shoe per ortho note, no formal orders in place. Pt was able to perform basic self care tasks, functional t/f's and mobility with supervision. Pt and spouse educated on wb precautions, donning/doffing of offloading shoe, proper wb techniques to promote healing and ADL modifications; stated understanding. Pt does not present the need for further acute OT at this time; however will remain available to assist w/ d/c planning and questions should pt have any.   Plan of Care: not being followed actively  Discharge Recommendations:  Equipment: No Equipment Needed. Post-acute therapy Recommend home health or outpatient transitional care services for continued occupational therapy services      See \"Rehab Therapy-Acute\" Patient Summary Report for complete documentation.    "

## 2019-05-11 NOTE — THERAPY
"Physical Therapy Evaluation completed.   Bed Mobility:     Transfers: Sit to Stand: Stand by Assist  Gait: Level Of Assist: Stand by Assist with Single Point Cane       Plan of Care: Will benefit from Physical Therapy 1 times per week  Discharge Recommendations: Equipment: No Equipment Needed. Post-acute therapy Discharge to home with outpatient or home health for additional skilled therapy services.    See \"Rehab Therapy-Acute\" Patient Summary Report for complete documentation.     "

## 2019-05-11 NOTE — DISCHARGE PLANNING
Received Choice form at [phone]  Agency/Facility Name: [facility]  Referral sent per Choice form @ [phone]

## 2019-05-11 NOTE — CARE PLAN
Problem: Safety  Goal: Will remain free from falls  Outcome: PROGRESSING AS EXPECTED  Patient calls to alert staff of needs. Hourly rounding in place. Call light within reach.     Problem: Infection  Goal: Will remain free from infection  Outcome: PROGRESSING AS EXPECTED  Standard precautions in place. Hand hygiene performed before and after patient contact.

## 2019-05-11 NOTE — DISCHARGE PLANNING
Anticipated Discharge Disposition: Home with HH    Action: SW met with pt at bedside regarding HH.  Pt chose [facility].  SW faxed choice to LAKIA Srinivasan.     Barriers to Discharge: HH acceptance    Plan: F/U on HH referral.

## 2019-05-11 NOTE — PROGRESS NOTES
"Pharmacy Kinetics 85 y.o. male on vancomycin day # 3  2019    Currently on Vancomycin 2000 mg iv q24hr   (2200)     Indication for Treatment: OM     Pertinent history per medical record: Admitted on 2019 for foot infection/osteomyelitis. Presents with erythema, drainage and swelling of right second toe with s/sx over the past 2 1/2 weeks.  Pt reported to [facility] where imaging showed osteomyelitis. Per ortho - 5/10 OR for InD and 2nd toe amputation.     Other antibiotics: ampicillin/sulbactam 3g IV q6h     Allergies: Patient has no known allergies.      List concerns for renal function: age, obesity (BMI 39)     Pertinent cultures to date:   19: Blood x2 - NGTD  5/10/19: R 2nd toe - NGTD     MRSA nares swab if pneumonia is a concern (ordered/positive/negative/n-a): N/A    Recent Labs      19   2353   WBC  9.1  9.0   NEUTSPOLYS  66.00   --      Recent Labs      19   2353   BUN  18  17   CREATININE  1.03  1.03   ALBUMIN  3.8   --      No results for input(s): VANCOTROUGH, VANCOPEAK, VANCORANDOM in the last 72 hours.  Intake/Output Summary (Last 24 hours) at 19 1233  Last data filed at 05/10/19 1400   Gross per 24 hour   Intake              400 ml   Output               10 ml   Net              390 ml      /87   Pulse 64   Temp 36.1 °C (97 °F) (Temporal)   Resp 16   Ht 1.854 m (6' 1\")   Wt (!) 136.3 kg (300 lb 7.8 oz)   SpO2 92%  Temp (24hrs), Av.2 °C (97.2 °F), Min:35.9 °C (96.7 °F), Max:36.7 °C (98 °F)      A/P   1. Vancomycin dose change: none  2. Next vancomycin level: tonight, 19, at 2130   (ordered)  3. Goal trough: 12-16 mcg/mL  4. Comments: No new CBC or BMP to assess. I&O's likely inaccurate. Cultures from toe infection obtained s/p toe amputation; all cultures are without bacterial growth at this time. Continue current dose. Trough level ordered for prior to 3rd dose to assess accumulation and dose " appropriateness. Pharmacy to monitor cultures for possible de-escalation.      Courtney Martin, PharmD., BCPS

## 2019-05-11 NOTE — FACE TO FACE
Face to Face Supporting Documentation - Home Health    The encounter with this patient was in whole or in part the primary reason for home health admission.    Date of encounter:   Patient:                    MRN:                       YOB: 2019  Castillo Echevarria Jr.  [id number]  12/24/1933     Home health to see patient for:  Skilled Nursing care for assessment, interventions & education  PT/OT  Skilled need for:  Surgical Aftercare postop toe amp    Skilled nursing interventions to include:  Comment: postsurgical    Homebound status evidenced by:  Needs the assistance of another person in order to leave the home. Leaving home requires a considerable and taxing effort. There is a normal inability to leave the home.    Community Physician to provide follow up care: Yosi Thornton M.D.     Optional Interventions? No      I certify the face to face encounter for this home health care referral meets the CMS requirements and the encounter/clinical assessment with the patient was, in whole, or in part, for the medical condition(s) listed above, which is the primary reason for home health care. Based on my clinical findings: the service(s) are medically necessary, support the need for home health care, and the homebound criteria are met.  I certify that this patient has had a face to face encounter by myself.  Ulisses Abreu M.D. - NPI: [id number]

## 2019-05-11 NOTE — PROGRESS NOTES
Pt to be restarted on heparin drip. Aptt ordered and MD stated to restart drip based on results of Aptt.

## 2019-05-12 PROBLEM — R73.9 HYPERGLYCEMIA: Status: ACTIVE | Noted: 2019-05-12

## 2019-05-12 LAB
ANION GAP SERPL CALC-SCNC: 9 MMOL/L (ref 0–11.9)
BUN SERPL-MCNC: 13 MG/DL (ref 8–22)
CALCIUM SERPL-MCNC: 8.6 MG/DL (ref 8.5–10.5)
CHLORIDE SERPL-SCNC: 103 MMOL/L (ref 96–112)
CO2 SERPL-SCNC: 29 MMOL/L (ref 20–33)
CREAT SERPL-MCNC: 1.07 MG/DL (ref 0.5–1.4)
ERYTHROCYTE [DISTWIDTH] IN BLOOD BY AUTOMATED COUNT: 45.9 FL (ref 35.9–50)
EST. AVERAGE GLUCOSE BLD GHB EST-MCNC: 169 MG/DL
GLUCOSE SERPL-MCNC: 143 MG/DL (ref 65–99)
HBA1C MFR BLD: 7.5 % (ref 0–5.6)
HCT VFR BLD AUTO: 44.2 % (ref 42–52)
HGB BLD-MCNC: 14 G/DL (ref 14–18)
MCH RBC QN AUTO: 31.4 PG (ref 27–33)
MCHC RBC AUTO-ENTMCNC: 31.7 G/DL (ref 33.7–35.3)
MCV RBC AUTO: 99.1 FL (ref 81.4–97.8)
PLATELET # BLD AUTO: 181 K/UL (ref 164–446)
PMV BLD AUTO: 9 FL (ref 9–12.9)
POTASSIUM SERPL-SCNC: 3.7 MMOL/L (ref 3.6–5.5)
RBC # BLD AUTO: 4.46 M/UL (ref 4.7–6.1)
SODIUM SERPL-SCNC: 141 MMOL/L (ref 135–145)
WBC # BLD AUTO: 11.3 K/UL (ref 4.8–10.8)

## 2019-05-12 PROCEDURE — 700111 HCHG RX REV CODE 636 W/ 250 OVERRIDE (IP): Performed by: INTERNAL MEDICINE

## 2019-05-12 PROCEDURE — 770001 HCHG ROOM/CARE - MED/SURG/GYN PRIV*

## 2019-05-12 PROCEDURE — 85027 COMPLETE CBC AUTOMATED: CPT

## 2019-05-12 PROCEDURE — 700105 HCHG RX REV CODE 258: Performed by: INTERNAL MEDICINE

## 2019-05-12 PROCEDURE — A9270 NON-COVERED ITEM OR SERVICE: HCPCS | Performed by: HOSPITALIST

## 2019-05-12 PROCEDURE — 83036 HEMOGLOBIN GLYCOSYLATED A1C: CPT

## 2019-05-12 PROCEDURE — 700102 HCHG RX REV CODE 250 W/ 637 OVERRIDE(OP): Performed by: HOSPITALIST

## 2019-05-12 PROCEDURE — 700105 HCHG RX REV CODE 258: Performed by: HOSPITALIST

## 2019-05-12 PROCEDURE — 80048 BASIC METABOLIC PNL TOTAL CA: CPT

## 2019-05-12 PROCEDURE — 700111 HCHG RX REV CODE 636 W/ 250 OVERRIDE (IP): Performed by: HOSPITALIST

## 2019-05-12 PROCEDURE — A9270 NON-COVERED ITEM OR SERVICE: HCPCS | Performed by: INTERNAL MEDICINE

## 2019-05-12 PROCEDURE — 99233 SBSQ HOSP IP/OBS HIGH 50: CPT | Performed by: INTERNAL MEDICINE

## 2019-05-12 PROCEDURE — 36415 COLL VENOUS BLD VENIPUNCTURE: CPT

## 2019-05-12 PROCEDURE — 700102 HCHG RX REV CODE 250 W/ 637 OVERRIDE(OP): Performed by: INTERNAL MEDICINE

## 2019-05-12 RX ADMIN — APIXABAN 5 MG: 5 TABLET, FILM COATED ORAL at 05:30

## 2019-05-12 RX ADMIN — ATENOLOL 25 MG: 50 TABLET ORAL at 05:30

## 2019-05-12 RX ADMIN — LEVOTHYROXINE SODIUM 100 MCG: 25 TABLET ORAL at 05:30

## 2019-05-12 RX ADMIN — AMPICILLIN SODIUM AND SULBACTAM SODIUM 3 G: 2; 1 INJECTION, POWDER, FOR SOLUTION INTRAMUSCULAR; INTRAVENOUS at 19:26

## 2019-05-12 RX ADMIN — BUMETANIDE 1 MG: 1 TABLET ORAL at 05:29

## 2019-05-12 RX ADMIN — VANCOMYCIN HYDROCHLORIDE 1000 MG: 100 INJECTION, POWDER, LYOPHILIZED, FOR SOLUTION INTRAVENOUS at 15:37

## 2019-05-12 RX ADMIN — AMPICILLIN SODIUM AND SULBACTAM SODIUM 3 G: 2; 1 INJECTION, POWDER, FOR SOLUTION INTRAMUSCULAR; INTRAVENOUS at 13:45

## 2019-05-12 RX ADMIN — APIXABAN 5 MG: 5 TABLET, FILM COATED ORAL at 16:55

## 2019-05-12 RX ADMIN — AMPICILLIN SODIUM AND SULBACTAM SODIUM 3 G: 2; 1 INJECTION, POWDER, FOR SOLUTION INTRAMUSCULAR; INTRAVENOUS at 07:57

## 2019-05-12 RX ADMIN — ATORVASTATIN CALCIUM 10 MG: 10 TABLET, FILM COATED ORAL at 19:26

## 2019-05-12 NOTE — PROGRESS NOTES
[facility] Medicine Daily Progress Note    Date of Service  5/12/2019    Chief Complaint  85 y.o. male admitted 5/9/2019 with Wound Infection (2nd toe on rt foot is infected and failed outpt tx.  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  Presented with synptoms that began 3 weeks ago after his right second toenail fell off where he had progressively increasing pain and swelling of the right second toe as well as increased erythema on the right second toe and forefoot. He was seen at urgent care and was provided with a prescription for clindamycin.  His symptoms worsened with purulent discharge and streaking erythema per Wife last Monday, so he returned Urgent Care who then referred them to the ED. On evaluation there was concern for osteomyelitis of the L 2nd toe. Dr. Gomez, Orthoprdics LPS is consulted. S/p L 2nd toe amputation by Dr. Bullock 5/10. Eliquis restarted.      Interval Problem Update  5/10. Underwent L 2nd toe amputation. Tolerated procedure. Sleeping but he seems irritable when I tried to wake him up. His family at bedside. Family reports he is on anticoagulation and anxious as they want it restarted.  5/11. Tolerated procedure. Minimal discomfort.   5/12. Doing well.Wound team at bedside. Family at bedside.     Consultants/Specialty  Orthopedics    Code Status  full    Disposition  PT/OT pending  Offer [facility]    Review of Systems  Review of Systems   Unable to perform ROS: Mental acuity    Sleeping, did not want to be disturbed    Physical Exam  Temp:  [36.2 °C (97.2 °F)-37.2 °C (98.9 °F)] 36.2 °C (97.2 °F)  Pulse:  [54-75] 54  Resp:  [16-17] 16  BP: (118-167)/(67-84) 118/67  SpO2:  [90 %-94 %] 90 %    Physical Exam   Constitutional: He appears well-developed.   Elderly   HENT:   Head: Normocephalic and atraumatic.   Eyes: Conjunctivae are  normal. No scleral icterus.   Neck: Normal range of motion. Neck supple.   Cardiovascular: Normal rate and regular rhythm.  Exam reveals no gallop and no friction rub.    No murmur heard.  Pulmonary/Chest: Effort normal and breath sounds normal. No respiratory distress. He has no wheezes. He has no rales.   Abdominal: Soft. Bowel sounds are normal. He exhibits no distension. There is no tenderness. There is no rebound and no guarding.   Musculoskeletal: He exhibits tenderness (L foot, minimal) and deformity (L toe amputation). He exhibits no edema.   L foot dressing, CDI  Examined surgical site. Serosunguinous drainage with possibly purulence vs tophicaceous area   Neurological: He is alert.   Skin: Skin is warm. There is erythema (R foot dorsum).   Psychiatric: He has a normal mood and affect. His behavior is normal.   Slightly irritable     Fluids    Intake/Output Summary (Last 24 hours) at 05/12/19 1410  Last data filed at 05/12/19 0800   Gross per 24 hour   Intake              410 ml   Output                0 ml   Net              410 ml       Laboratory  Recent Labs      05/09/19   2353  05/11/19   1514  05/12/19   0852   WBC  9.0  11.4*  11.3*   RBC  4.40*  4.52*  4.46*   HEMOGLOBIN  14.2  14.4  14.0   HEMATOCRIT  42.9  43.5  44.2   MCV  97.5  96.2  99.1*   MCH  32.3  31.9  31.4   MCHC  33.1*  33.1*  31.7*   RDW  46.1  45.8  45.9   PLATELETCT  189  209  181   MPV  8.8*  9.0  9.0     Recent Labs      05/09/19   2353  05/11/19   1452  05/12/19   0852   SODIUM  142  140  141   POTASSIUM  3.6  3.9  3.7   CHLORIDE  105  103  103   CO2  29  30  29   GLUCOSE  237*  137*  143*   BUN  17  14  13   CREATININE  1.03  1.16  1.07   CALCIUM  8.5  8.8  8.6     Recent Labs      05/10/19   1629  05/10/19   2352  05/11/19   0538   APTT  28.3  50.4*  88.3*               Imaging  No orders to display        Assessment/Plan  * Toe infection   Assessment & Plan    s/p toe amputation 5/10 by Dr. Bullock  No further surgeries  planned, can restart anticoagulation per Orthopedics.  5/10 bone tissue cultures so far NGTD  5/10 bone tissue pathology PENDING  LPS to continue to follow  Called and consulted ID for antibiotic recommendations.   Ordered labs.   On 5/12, wound still looks wet, draining mix of serosanguinous and pus vs tophi.  Explained plan to patient and family at bedside.      Hyperglycemia   Assessment & Plan    Ordered A1c  May start SS insulin depending on A1c       HLD (hyperlipidemia)   Assessment & Plan    Continue home Lipitor, no complaints of chest pain.     Hypothyroidism   Assessment & Plan    This is chronic and stable, continue home Synthroid.     History of DVT (deep vein thrombosis)- (present on admission)   Assessment & Plan    Continue hep agtt bridge  On 5/11, Orthopedics cleared ro restart Eliquis  Discussed with Pharmacy. Increased Eliquis dosing compared to home dose given indication     History of pulmonary embolism- (present on admission)   Assessment & Plan    Continue hepa gtt bridge  On 5/11, Orthopedics cleared ro restart Eliquis  Discussed with Pharmacy. Increased Eliquis dosing compared to home dose given indication     HTN (hypertension)- (present on admission)   Assessment & Plan    Blood pressure is currently well controlled on home Bumex and atenolol, continue     Factor V Leiden (HCC)   Assessment & Plan    Family reports history of saddle PE and VTs  They are anxious and want to restart anticoagulation as soon as possible.  Hepa gtt restarted  On 5/11, Orthopedics cleared ro restart Eliquis  Discussed with Pharmacy. Increased Eliquis dosing compared to home dose given indication          VTE prophylaxis: heparin gtt  Reviewed vitals, labs, imaging, staff notes.  Discussed assessment and plan with Castillo Echevarria Jr. And family  Discussed with RN, SW, Wound Team

## 2019-05-12 NOTE — PROGRESS NOTES
"Pharmacy Kinetics 85 y.o. male on vancomycin day # 4  2019    Currently on Vancomycin 2000 mg iv q24hr   (2200)     Indication for Treatment: OM     Pertinent history per medical record: Admitted on 2019 for foot infection/osteomyelitis. Presents with erythema, drainage and swelling of right second toe with s/sx over the past 2 1/2 weeks.  Pt reported to [facility] where imaging showed osteomyelitis. Per ortho - 5/10 OR for InD and 2nd toe amputation.     Other antibiotics: ampicillin/sulbactam 3g IV q6h     Allergies: Patient has no known allergies.      List concerns for renal function: age, obesity (BMI 39)     Pertinent cultures to date:   19: Blood x2 - NGTD  5/10/19: R 2nd toe - NGTD     MRSA nares swab if pneumonia is a concern (ordered/positive/negative/n-a): N/A    Recent Labs      19   2353  05/11/19   1514  19   0852   WBC  9.1  9.0  11.4*  11.3*   NEUTSPOLYS  66.00   --    --    --      Recent Labs      19   2353  19   1452  19   0852   BUN  18  17  14  13   CREATININE  1.03  1.03  1.16  1.07   ALBUMIN  3.8   --    --    --      Recent Labs      19   2234   VANCOTROUGH  9.5*     Intake/Output Summary (Last 24 hours) at 19 1037  Last data filed at 19 0800   Gross per 24 hour   Intake              410 ml   Output                0 ml   Net              410 ml      /67   Pulse (!) 54   Temp 36.2 °C (97.2 °F) (Temporal)   Resp 16   Ht 1.854 m (6' 1\")   Wt (!) 136.3 kg (300 lb 7.8 oz)   SpO2 90%  Temp (24hrs), Av.7 °C (98.1 °F), Min:36.2 °C (97.2 °F), Max:37.2 °C (98.9 °F)      A/P   1. Vancomycin dose change: 1000mg IV q12h   (0400,  1600)  2. Next vancomycin level: tomorrow, 19, at 1530   (ordered)  3. Goal trough: 12-16 mcg/mL  4. Comments: Slight leukocytosis but afebrile. Cultures remain with no growth at 24hrs. Renal function appears stable per renal indices. Trough level is " subtherapeutic - will likely tolerate BID dosing. Start ~7mg/kg per protocol. Last night's dose was interupted d/t IV access; dose was restarted around 0400 so will start BID dosing at 1600. Trough level ordered for prior to 3rd new dose to assess accumulation and dose appropriateness. Pharmacy to continue to follow ID recommendations.       Courtney Martin, PharmD., BCPS

## 2019-05-12 NOTE — PROGRESS NOTES
" LIMB PRESERVATION SERVICE    HPI:      Castillo Echevarria Jr. is a 85 y.o. 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  This wound started from a Second toe nail Sx removal that became infected the beginning of April 2019.  Pt has been treating the wound with neosporin.   The wound has failed to improve.   Pt is not diabetic but states being borderline.  They do have numbness in her feet.      5/11/19. Told pt would return tomorrow for dressing change on POD #2 as planned. Pt insisting on dressing change today because of anticoagulation becoming problem and pt stating they were left on heparin only because they were waiting for a wound site evaluation today because that was what they were told. Educated pt how wound needed to have two day to clot especially with heparin being administered. Pt stated the dressing was already removed, fell off, and redressed. Agreed to evaluate site to make sure dressing was reapplied appropriately again.     5/12/19 Wound site evaluated found to still be macerated and draining. D/W Dr Park. Pt will be evaluated for POC by Dr Bullock tomorrow. Infectious Disease contacted by Dr Park. Patient denies fevers, chills, nausea, vomiting.  Pain well controlled.     ASSESSMENT: Procedure(s):    /67   Pulse (!) 54   Temp 36.2 °C (97.2 °F) (Temporal)   Resp 16   Ht 1.854 m (6' 1\")   Wt (!) 136.3 kg (300 lb 7.8 oz)   SpO2 90%   BMI 39.64 kg/m²              Incision 05/10/19 (Active)   Wound Image   5/11/2019 10:30 AM   Site Assessment Red 5/12/2019 12:00 PM   Ruthann-wound Assessment Maceration;Purple;Edema;Dark edges 5/12/2019 12:00 PM   Margins Attached edges 5/12/2019 12:00 PM   Closure Approximated;Sutures 5/12/2019 12:00 PM   Drainage Amount Moderate 5/12/2019 12:00 PM "   Drainage Description Serosanguineous 5/12/2019 12:00 PM   Treatments Cleansed;Site care 5/12/2019 12:00 PM   Periwound Protectant Skin Protectant wipes to Periwound 5/12/2019 12:00 PM   Dressing Options Hydrofiber Silver;Hypafix Tape;Nonadhesive Foam;Roll Gauze;Tubigrip 5/12/2019 12:00 PM   Dressing Changed Changed 5/12/2019 12:00 PM   Dressing Status Clean;Dry;Intact 5/12/2019 12:00 PM   Dressing Change Frequency Daily 5/12/2019 12:00 PM   NEXT Dressing Change  05/13/19 5/12/2019 12:00 PM   NEXT Weekly Photo (Inpatient Only) 05/18/19 5/11/2019 10:30 AM   WOUND NURSE ONLY - Odor None 5/12/2019 12:00 PM   WOUND NURSE ONLY - Pulses Right;DP;PT;1+ 5/12/2019 12:00 PM   WOUND NURSE ONLY - Exposed Structures None 5/12/2019 12:00 PM   WOUND NURSE ONLY - Tissue Type and Percentage 100% Red 5/12/2019 12:00 PM   WOUND NURSE ONLY - Time Spent with Patient (mins) 45 5/12/2019 12:00 PM            DIABETES MANAGEMENT:  Lab Results   Component Value Date/Time    GLUCOSE 143 (H) 05/12/2019 08:52 AM      No results found for: HBA1C     Diabetes education PEND    INFECTION MANAGEMENT:  WBC   Date/Time Value Ref Range Status   05/12/2019 08:52 AM 11.3 (H) 4.8 - 10.8 K/uL Final       Microbiology:   Results     Procedure Component Value Units Date/Time    CULTURE TISSUE W/ GRM STAIN [[id number]] Collected:  05/10/19 1326    Order Status:  Completed Specimen:  Bone Updated:  05/12/19 1113     Significant Indicator NEG     Source BONE     Site Right 2nd Toe     Culture Result No growth at 48 hours.     Gram Stain Result Few WBCs.  No organisms seen.      Narrative:       Surgery Specimen    Anaerobic Culture [[id number]] Collected:  05/10/19 1326    Order Status:  Completed Specimen:  Bone Updated:  05/12/19 1113     Significant Indicator NEG     Source BONE     Site Right 2nd Toe     Culture Result Culture in progress.    Narrative:       Surgery Specimen    GRAM STAIN [[id number]] Collected:  05/10/19 1326    Order Status:  Completed  "Specimen:  Bone Updated:  05/10/19 2131     Significant Indicator .     Source BONE     Site Right 2nd Toe     Gram Stain Result Few WBCs.  No organisms seen.      Narrative:       Surgery Specimen    BLOOD CULTURE x2 [[id number]] Collected:  05/09/19 1958    Order Status:  Completed Specimen:  Blood from Peripheral Updated:  05/10/19 0958     Significant Indicator NEG     Source BLD     Site PERIPHERAL     Culture Result No Growth  Note: Blood cultures are incubated for 5 days and  are monitored continuously.Positive blood cultures  are called to the RN and reported as soon as  they are identified.      Narrative:       Per Hospital Policy: Only change Specimen Src: to \"Line\" if  specified by physician order.  Left AC    BLOOD CULTURE x2 [[id number]] Collected:  05/09/19 2000    Order Status:  Completed Specimen:  Blood from Peripheral Updated:  05/10/19 0957     Significant Indicator NEG     Source BLD     Site PERIPHERAL     Culture Result No Growth  Note: Blood cultures are incubated for 5 days and  are monitored continuously.Positive blood cultures  are called to the RN and reported as soon as  they are identified.      Narrative:       Per Hospital Policy: Only change Specimen Src: to \"Line\" if  specified by physician order.  Right Forearm/Arm             PLAN:  Right 2nd Toe site - limb preservation site guarded  D/W Dr Park and Dr Kobe Bullock to evaluate Monday AM for possible procedure Monday  Wound care: Resume previous Wound Care orders.  Nursing to change Right 2nd Toe every day and PRN for Saturation or Dislodgement  Wound Care by Nursing, LPS to Follow.    Offloading: Offloading shoe    Weight Bearing Status: Weight bearing as tolerated    Antibiotics: Per ID recommendation    Surgery: possible Surgery Monday with Dr Bullock    DISCHARGE PLAN:    Disposition: TBD    Follow-up: TBD    Other: D/W Bedside RN, Dr Park, Dr Kobe Enrique [id number].    "

## 2019-05-12 NOTE — CARE PLAN
Problem: Discharge Barriers/Planning  Goal: Patient's continuum of care needs will be met    Intervention: Assess potential discharge barriers on admission and throughout hospital stay  POC discussed with pt at bedside       Problem: Pain Management  Goal: Pain level will decrease to patient's comfort goal    Intervention: Follow pain managment plan developed in collaboration with patient and Interdisciplinary Team  Pt states no pain in lower extremities

## 2019-05-12 NOTE — PROGRESS NOTES
Notified Dr. Irwin of pain, swelling and redness at IV site, including IV removal and unsuccessful attempts to start another IV. Received orders to restart IV antibiotics once new IV site is established. Awaiting ICU staff who are certified in ultrasound guided IV insertion.

## 2019-05-12 NOTE — CARE PLAN
Problem: Safety  Goal: Will remain free from falls  Outcome: PROGRESSING AS EXPECTED  Bed brakes applied and bed in lowest position. Call light and personal belongings within reach. Patient encouraged to use call light to alert staff of needs. Patient verbalized understanding and calls as needed. Hourly rounding in place.     Problem: Infection  Goal: Will remain free from infection  Outcome: PROGRESSING AS EXPECTED  Hand hygiene performed before and after patient contact. Standard precautions in place.     Problem: Venous Thromboembolism (VTW)/Deep Vein Thrombosis (DVT) Prevention:  Goal: Patient will participate in Venous Thrombosis (VTE)/Deep Vein Thrombosis (DVT)Prevention Measures  Outcome: PROGRESSING AS EXPECTED  Bilateral SCDs in place.

## 2019-05-12 NOTE — PROGRESS NOTES
Patient reporting pain at IV site. Redness and swelling noted in the skin surrounding IV. Will remove IV and attempt to start a new IV.

## 2019-05-13 LAB
ANION GAP SERPL CALC-SCNC: 7 MMOL/L (ref 0–11.9)
BACTERIA TISS AEROBE CULT: NORMAL
BUN SERPL-MCNC: 16 MG/DL (ref 8–22)
CALCIUM SERPL-MCNC: 8.8 MG/DL (ref 8.5–10.5)
CHLORIDE SERPL-SCNC: 103 MMOL/L (ref 96–112)
CO2 SERPL-SCNC: 28 MMOL/L (ref 20–33)
CREAT SERPL-MCNC: 1.05 MG/DL (ref 0.5–1.4)
ERYTHROCYTE [DISTWIDTH] IN BLOOD BY AUTOMATED COUNT: 46.6 FL (ref 35.9–50)
GLUCOSE SERPL-MCNC: 134 MG/DL (ref 65–99)
GRAM STN SPEC: NORMAL
HCT VFR BLD AUTO: 42.3 % (ref 42–52)
HGB BLD-MCNC: 13.8 G/DL (ref 14–18)
MCH RBC QN AUTO: 31.9 PG (ref 27–33)
MCHC RBC AUTO-ENTMCNC: 32.6 G/DL (ref 33.7–35.3)
MCV RBC AUTO: 97.9 FL (ref 81.4–97.8)
PLATELET # BLD AUTO: 176 K/UL (ref 164–446)
PMV BLD AUTO: 9.2 FL (ref 9–12.9)
POTASSIUM SERPL-SCNC: 3.5 MMOL/L (ref 3.6–5.5)
RBC # BLD AUTO: 4.32 M/UL (ref 4.7–6.1)
SIGNIFICANT IND 70042: NORMAL
SITE SITE: NORMAL
SODIUM SERPL-SCNC: 138 MMOL/L (ref 135–145)
SOURCE SOURCE: NORMAL
VANCOMYCIN TROUGH SERPL-MCNC: 18 UG/ML (ref 10–20)
WBC # BLD AUTO: 8.8 K/UL (ref 4.8–10.8)

## 2019-05-13 PROCEDURE — 700102 HCHG RX REV CODE 250 W/ 637 OVERRIDE(OP): Performed by: INTERNAL MEDICINE

## 2019-05-13 PROCEDURE — 700105 HCHG RX REV CODE 258: Performed by: INTERNAL MEDICINE

## 2019-05-13 PROCEDURE — 80202 ASSAY OF VANCOMYCIN: CPT

## 2019-05-13 PROCEDURE — 97116 GAIT TRAINING THERAPY: CPT

## 2019-05-13 PROCEDURE — 700102 HCHG RX REV CODE 250 W/ 637 OVERRIDE(OP): Performed by: HOSPITALIST

## 2019-05-13 PROCEDURE — 97530 THERAPEUTIC ACTIVITIES: CPT

## 2019-05-13 PROCEDURE — A9270 NON-COVERED ITEM OR SERVICE: HCPCS | Performed by: HOSPITALIST

## 2019-05-13 PROCEDURE — 99233 SBSQ HOSP IP/OBS HIGH 50: CPT | Performed by: INTERNAL MEDICINE

## 2019-05-13 PROCEDURE — 85027 COMPLETE CBC AUTOMATED: CPT

## 2019-05-13 PROCEDURE — 700111 HCHG RX REV CODE 636 W/ 250 OVERRIDE (IP): Performed by: INTERNAL MEDICINE

## 2019-05-13 PROCEDURE — 770001 HCHG ROOM/CARE - MED/SURG/GYN PRIV*

## 2019-05-13 PROCEDURE — A9270 NON-COVERED ITEM OR SERVICE: HCPCS | Performed by: INTERNAL MEDICINE

## 2019-05-13 PROCEDURE — 700111 HCHG RX REV CODE 636 W/ 250 OVERRIDE (IP): Performed by: HOSPITALIST

## 2019-05-13 PROCEDURE — 99232 SBSQ HOSP IP/OBS MODERATE 35: CPT | Performed by: INTERNAL MEDICINE

## 2019-05-13 PROCEDURE — 80048 BASIC METABOLIC PNL TOTAL CA: CPT

## 2019-05-13 PROCEDURE — 700105 HCHG RX REV CODE 258: Performed by: HOSPITALIST

## 2019-05-13 PROCEDURE — 36415 COLL VENOUS BLD VENIPUNCTURE: CPT

## 2019-05-13 RX ADMIN — VANCOMYCIN HYDROCHLORIDE 1000 MG: 100 INJECTION, POWDER, LYOPHILIZED, FOR SOLUTION INTRAVENOUS at 16:08

## 2019-05-13 RX ADMIN — LEVOTHYROXINE SODIUM 100 MCG: 25 TABLET ORAL at 05:41

## 2019-05-13 RX ADMIN — APIXABAN 5 MG: 5 TABLET, FILM COATED ORAL at 05:41

## 2019-05-13 RX ADMIN — ATENOLOL 25 MG: 50 TABLET ORAL at 05:41

## 2019-05-13 RX ADMIN — APIXABAN 5 MG: 5 TABLET, FILM COATED ORAL at 18:11

## 2019-05-13 RX ADMIN — VANCOMYCIN HYDROCHLORIDE 1000 MG: 100 INJECTION, POWDER, LYOPHILIZED, FOR SOLUTION INTRAVENOUS at 04:32

## 2019-05-13 RX ADMIN — AMPICILLIN SODIUM AND SULBACTAM SODIUM 3 G: 2; 1 INJECTION, POWDER, FOR SOLUTION INTRAMUSCULAR; INTRAVENOUS at 14:23

## 2019-05-13 RX ADMIN — ATORVASTATIN CALCIUM 10 MG: 10 TABLET, FILM COATED ORAL at 21:11

## 2019-05-13 RX ADMIN — BUMETANIDE 1 MG: 1 TABLET ORAL at 05:41

## 2019-05-13 RX ADMIN — AMPICILLIN SODIUM AND SULBACTAM SODIUM 3 G: 2; 1 INJECTION, POWDER, FOR SOLUTION INTRAMUSCULAR; INTRAVENOUS at 01:30

## 2019-05-13 RX ADMIN — AMPICILLIN SODIUM AND SULBACTAM SODIUM 3 G: 2; 1 INJECTION, POWDER, FOR SOLUTION INTRAMUSCULAR; INTRAVENOUS at 21:11

## 2019-05-13 RX ADMIN — AMPICILLIN SODIUM AND SULBACTAM SODIUM 3 G: 2; 1 INJECTION, POWDER, FOR SOLUTION INTRAMUSCULAR; INTRAVENOUS at 08:35

## 2019-05-13 ASSESSMENT — COGNITIVE AND FUNCTIONAL STATUS - GENERAL
MOBILITY SCORE: 24
SUGGESTED CMS G CODE MODIFIER MOBILITY: CH

## 2019-05-13 ASSESSMENT — ENCOUNTER SYMPTOMS
CONSTIPATION: 0
VOMITING: 0
DIARRHEA: 0
FEVER: 0
MYALGIAS: 0
ABDOMINAL PAIN: 0
NAUSEA: 0
CHILLS: 0

## 2019-05-13 ASSESSMENT — GAIT ASSESSMENTS
DISTANCE (FEET): 150
ASSISTIVE DEVICE: SINGLE POINT CANE
GAIT LEVEL OF ASSIST: SUPERVISED

## 2019-05-13 NOTE — PROGRESS NOTES
LIMB PRESERVATION SERVICE    HPI:      Castillo Echevarria Jr. is a 85 y.o. 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  This wound started from a Second toe nail Sx removal that became infected the beginning of April 2019.  Pt has been treating the wound with neosporin.   The wound has failed to improve.   Pt is not diabetic but states being borderline.  They do have numbness in her feet.      5/11/19. Told pt would return tomorrow for dressing change on POD #2 as planned. Pt insisting on dressing change today because of anticoagulation becoming problem and pt stating they were left on heparin only because they were waiting for a wound site evaluation today because that was what they were told. Educated pt how wound needed to have two day to clot especially with heparin being administered. Pt stated the dressing was already removed, fell off, and redressed. Agreed to evaluate site to make sure dressing was reapplied appropriately again.      5/12/19 Wound site evaluated found to still be macerated and draining. D/W Dr Park. Pt will be evaluated for POC by Dr Bullock tomorrow. Infectious Disease contacted by Dr Park. Patient denies fevers, chills, nausea, vomiting.  Pain well controlled.     5/13/19 Pt seen with Dr Bullock, Dr Jameson and Dr Andrade. D/W Dr Abreu and Bedside. Patient denies fevers, chills, nausea, vomiting.  Pain well controlled. D/W Pt Diabetes Education and S/S of site infection and when to return to ED.    ASSESSMENT: Procedure(s):  1.  Right second toe amputation of the metatarsophalangeal joint.  2.  Right foot irrigation and debridement, dorsal and plantar compartments.     SURGEON:  Wood Bullock MD 5/10/19  /70   Pulse (!) 58   Temp 36.1 °C (97 °F) (Temporal)   Resp 16    "Ht 1.854 m (6' 1\")   Wt (!) 136.3 kg (300 lb 7.8 oz)   SpO2 93%   BMI 39.64 kg/m²               Incision 05/10/19 (Active)   Wound Image   5/11/2019 10:30 AM   Site Assessment Red 5/13/2019 10:00 AM   Ruthann-wound Assessment White;Edema 5/13/2019 10:00 AM   Margins Attached edges 5/13/2019 10:00 AM   Closure Approximated;Sutures 5/13/2019 10:00 AM   Drainage Amount Small 5/13/2019 10:00 AM   Drainage Description Serosanguineous 5/13/2019 10:00 AM   Treatments Cleansed;Site care 5/13/2019 10:00 AM   Periwound Protectant Skin Protectant wipes to Periwound 5/13/2019 10:00 AM   Dressing Options Hydrofiber Silver;Hypafix Tape;Nonadhesive Foam;Roll Gauze;Tubigrip 5/13/2019 10:00 AM   Dressing Changed Changed 5/13/2019 10:00 AM   Dressing Status Clean;Dry;Intact 5/13/2019 10:00 AM   Dressing Change Frequency Daily 5/13/2019 10:00 AM   NEXT Dressing Change  05/14/19 5/13/2019 10:00 AM   NEXT Weekly Photo (Inpatient Only) 05/18/19 5/11/2019 10:30 AM   WOUND NURSE ONLY - Odor None 5/13/2019 10:00 AM   WOUND NURSE ONLY - Pulses Right;1+;DP 5/13/2019 10:00 AM   WOUND NURSE ONLY - Exposed Structures None 5/13/2019 10:00 AM   WOUND NURSE ONLY - Tissue Type and Percentage 100% Red 5/13/2019 10:00 AM   WOUND NURSE ONLY - Time Spent with Patient (mins) 45 5/13/2019 10:00 AM          DIABETES MANAGEMENT:  Lab Results   Component Value Date/Time    GLUCOSE 134 (H) 05/13/2019 04:27 AM      Lab Results   Component Value Date/Time    HBA1C 7.5 (H) 05/12/2019 08:52 AM        Diabetes education ordered    INFECTION MANAGEMENT:  WBC   Date/Time Value Ref Range Status   05/13/2019 04:27 AM 8.8 4.8 - 10.8 K/uL Final       Microbiology:   Results     Procedure Component Value Units Date/Time    Anaerobic Culture [[id number]] Collected:  05/10/19 1326    Order Status:  Completed Specimen:  Bone Updated:  05/13/19 0858     Significant Indicator NEG     Source BONE     Site Right 2nd Toe     Culture Result Culture in progress.    Narrative:       " "Surgery Specimen    CULTURE TISSUE W/ GRM STAIN [[id number]] Collected:  05/10/19 1326    Order Status:  Completed Specimen:  Bone Updated:  05/13/19 0858     Significant Indicator NEG     Source BONE     Site Right 2nd Toe     Culture Result No growth at 72 hours.     Gram Stain Result Few WBCs.  No organisms seen.      Narrative:       Surgery Specimen    GRAM STAIN [[id number]] Collected:  05/10/19 1326    Order Status:  Completed Specimen:  Bone Updated:  05/10/19 2131     Significant Indicator .     Source BONE     Site Right 2nd Toe     Gram Stain Result Few WBCs.  No organisms seen.      Narrative:       Surgery Specimen    BLOOD CULTURE x2 [[id number]] Collected:  05/09/19 1958    Order Status:  Completed Specimen:  Blood from Peripheral Updated:  05/10/19 0958     Significant Indicator NEG     Source BLD     Site PERIPHERAL     Culture Result No Growth  Note: Blood cultures are incubated for 5 days and  are monitored continuously.Positive blood cultures  are called to the RN and reported as soon as  they are identified.      Narrative:       Per Hospital Policy: Only change Specimen Src: to \"Line\" if  specified by physician order.  Left AC    BLOOD CULTURE x2 [[id number]] Collected:  05/09/19 2000    Order Status:  Completed Specimen:  Blood from Peripheral Updated:  05/10/19 0957     Significant Indicator NEG     Source BLD     Site PERIPHERAL     Culture Result No Growth  Note: Blood cultures are incubated for 5 days and  are monitored continuously.Positive blood cultures  are called to the RN and reported as soon as  they are identified.      Narrative:       Per Hospital Policy: Only change Specimen Src: to \"Line\" if  specified by physician order.  Right Forearm/Arm             PLAN:  Patient requires skilled therapeutic intervention for debridement, product selection and application, education, wound bed preparation and assessment. OK to discharge from Ortho/LPS standpoint  Right 2nd Toe site   D/W  " Vivian, Dr Andrade, and Dr Bullock  Wound care: Resume previous Wound Care orders. May start changing every other day at home when drainage slows  Nursing to change Right 2nd Toe every day and PRN for Saturation or Dislodgement  Wound Care by Nursing, LPS to Follow.     Offloading: Offloading shoe     Weight Bearing Status: Weight bearing as tolerated     Antibiotics: Per ID recommendation     Surgery: No Surgery per Dr Bullock     DISCHARGE PLAN:     Disposition: Patient requires skilled therapeutic intervention for debridement, product selection and application, education, wound bed preparation and assessment. OK to discharge from Ortho/LPS standpoint. Recommend  Home Health for Weekly Wound Site checks for site break down until suture removal and F/U date.    Follow-up:  LISS for suture removal in 3 weeks.     Other: D/W Bedside RN, Dr Park, Dr Kobe Enrique R.N.

## 2019-05-13 NOTE — CARE PLAN
Problem: Venous Thromboembolism (VTW)/Deep Vein Thrombosis (DVT) Prevention:  Goal: Patient will participate in Venous Thrombosis (VTE)/Deep Vein Thrombosis (DVT)Prevention Measures  Outcome: PROGRESSING AS EXPECTED  Patient ambulatory and walks often, compliant with SCDs while in bed. Eloquis restarted. No signs or symptoms of DVT or PE at this time.

## 2019-05-13 NOTE — DISCHARGE PLANNING
Agency/Facility Name[facility]  Spoke To: Oliva  Outcome: Referral under review.

## 2019-05-13 NOTE — THERAPY
"Physical Therapy Treatment completed.   Bed Mobility:  Supine to Sit:  (up  in chair)  Transfers: Sit to Stand: Supervised  Gait: Level Of Assist: Supervised with Single Point Cane       Plan of Care: Patient with no further skilled PT needs in the acute care setting at this time  Discharge Recommendations: Equipment: No Equipment Needed. Post-acute therapy Recommend outpatient or home health transitional care services for continued physical therapy services.    Significant improvements in mobility. Pt able to perform gait, transfers, and stairs without physical assist. All goals met. No additoinal acute PT needs. Recommend continue ambulating regularly with nursing staff to maintain current level of function. Recommend outpatient PT once cleared by MD.     See \"Rehab Therapy-Acute\" Patient Summary Report for complete documentation.         "

## 2019-05-13 NOTE — PROGRESS NOTES
[facility] Medicine Daily Progress Note    Date of Service  5/13/2019    Chief Complaint  85 y.o. male admitted 5/9/2019 with Wound Infection (2nd toe on rt foot is infected and failed outpt tx.  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  Presented with synptoms that began 3 weeks ago after his right second toenail fell off where he had progressively increasing pain and swelling of the right second toe as well as increased erythema on the right second toe and forefoot. He was seen at urgent care and was provided with a prescription for clindamycin.  His symptoms worsened with purulent discharge and streaking erythema per Wife last Monday, so he returned Urgent Care who then referred them to the ED. On evaluation there was concern for osteomyelitis of the L 2nd toe. Dr. Gomez, Orthoprdics LPS is consulted. S/p L 2nd toe amputation by Dr. Bullock 5/10. Eliquis restarted.      Interval Problem Update  5/10. Underwent L 2nd toe amputation. Tolerated procedure. Sleeping but he seems irritable when I tried to wake him up. His family at bedside. Family reports he is on anticoagulation and anxious as they want it restarted.  5/11. Tolerated procedure. Minimal discomfort.   5/12. Doing well.Wound team at bedside. Family at bedside.   5/13. No new issues or concerns. Wound Team felt wound is healing well and that discoloration was not pus. No further InD.     Consultants/Specialty  Orthopedics    Code Status  Full    Disposition  [facility] planned    Review of Systems  Review of Systems   Unable to perform ROS: Mental acuity    Sleeping, did not want to be disturbed    Physical Exam  Temp:  [36.1 °C (97 °F)-37.4 °C (99.4 °F)] 36.1 °C (97 °F)  Pulse:  [58-63] 58  Resp:  [16-18] 16  BP: (135-153)/(60-77) 139/70  SpO2:  [89 %-98 %] 93 %    Physical Exam   Constitutional:  He appears well-developed.   Elderly   HENT:   Head: Normocephalic and atraumatic.   Eyes: Conjunctivae are normal. No scleral icterus.   Neck: Normal range of motion. Neck supple.   Cardiovascular: Normal rate and regular rhythm.  Exam reveals no gallop and no friction rub.    No murmur heard.  Pulmonary/Chest: Effort normal and breath sounds normal. No respiratory distress. He has no wheezes. He has no rales.   Abdominal: Soft. Bowel sounds are normal. He exhibits no distension. There is no tenderness. There is no rebound and no guarding.   Musculoskeletal: He exhibits tenderness (L foot, minimal) and deformity (L toe amputation). He exhibits no edema.   L foot dressing, CDI  Examined surgical site. Less drainage. Wound looking better.   Neurological: He is alert.   Skin: Skin is warm. There is erythema (R foot dorsum).   Psychiatric: He has a normal mood and affect. His behavior is normal.   Slightly irritable     Fluids    Intake/Output Summary (Last 24 hours) at 05/13/19 1109  Last data filed at 05/12/19 2002   Gross per 24 hour   Intake              240 ml   Output                0 ml   Net              240 ml       Laboratory  Recent Labs      05/11/19   1514  05/12/19   0852  05/13/19   0427   WBC  11.4*  11.3*  8.8   RBC  4.52*  4.46*  4.32*   HEMOGLOBIN  14.4  14.0  13.8*   HEMATOCRIT  43.5  44.2  42.3   MCV  96.2  99.1*  97.9*   MCH  31.9  31.4  31.9   MCHC  33.1*  31.7*  32.6*   RDW  45.8  45.9  46.6   PLATELETCT  209  181  176   MPV  9.0  9.0  9.2     Recent Labs      05/11/19   1452  05/12/19   0852  05/13/19   0427   SODIUM  140  141  138   POTASSIUM  3.9  3.7  3.5*   CHLORIDE  103  103  103   CO2  30  29  28   GLUCOSE  137*  143*  134*   BUN  14  13  16   CREATININE  1.16  1.07  1.05   CALCIUM  8.8  8.6  8.8     Recent Labs      05/10/19   1629  05/10/19   2352  05/11/19   0538   APTT  28.3  50.4*  88.3*               Imaging  No orders to display        Assessment/Plan  * Toe infection   Assessment  & Plan    s/p toe amputation 5/10 by Dr. Bullock  No further surgeries planned, can restart anticoagulation per Orthopedics.  5/10 bone tissue cultures so far NGTD  5/10 bone tissue pathology PENDING  LPS to continue to follow  Called and consulted ID for antibiotic recommendations.   Ordered labs.   On 5/12, wound still looks wet, draining mix of serosanguinous and pus vs tophi.  Explained plan to patient and family at bedside.   On 5/13,   Discussed with Wound Team Orthopedics  Orthopedics do not forsee InDs and surgeries in the future right now  Awaiting final antibiotics recommendations, ID aware  Plan to discharge to [facility]     Hyperglycemia   Assessment & Plan    Ordered A1c  May start SS insulin depending on A1c       HLD (hyperlipidemia)   Assessment & Plan    Continue home Lipitor, no complaints of chest pain.     Hypothyroidism   Assessment & Plan    This is chronic and stable, continue home Synthroid.     History of DVT (deep vein thrombosis)- (present on admission)   Assessment & Plan    Continue hep agtt bridge  On 5/11, Orthopedics cleared ro restart Eliquis  Discussed with Pharmacy. Increased Eliquis dosing compared to home dose given indication     History of pulmonary embolism- (present on admission)   Assessment & Plan    Continue hepa gtt bridge  On 5/11, Orthopedics cleared ro restart Eliquis  Discussed with Pharmacy. Increased Eliquis dosing compared to home dose given indication     HTN (hypertension)- (present on admission)   Assessment & Plan    Blood pressure is currently well controlled on home Bumex and atenolol, continue     Factor V Leiden (HCC)   Assessment & Plan    Family reports history of saddle PE and VTs  They are anxious and want to restart anticoagulation as soon as possible.  Hepa gtt restarted  On 5/11, Orthopedics cleared ro restart Eliquis  Discussed with Pharmacy. Increased Eliquis dosing compared to home dose given indication          VTE prophylaxis: heparin gtt  Reviewed  vitals, labs, imaging, staff notes.  Discussed assessment and plan with Castillo Echevarria Jr. And family  Discussed with RN, SW, Wound Team

## 2019-05-13 NOTE — FACE TO FACE
Face to Face Supporting Documentation - Home Health    The encounter with this patient was in whole or in part the primary reason for home health admission.    Date of encounter:   Patient:                    MRN:                       YOB: 2019  Castillo Echevarria Jr.  [id number]  12/24/1933     Home health to see patient for:  Skilled Nursing care for assessment, interventions & education  PT/OT wound Care  Skilled need for:  Surgical Aftercare and wound care    Skilled nursing interventions to include:  Wound Care    Homebound status evidenced by:  Needs the assistance of another person in order to leave the home. Leaving home requires a considerable and taxing effort. There is a normal inability to leave the home.    Community Physician to provide follow up care: Yosi Thornton M.D.     Optional Interventions? No      I certify the face to face encounter for this home health care referral meets the CMS requirements and the encounter/clinical assessment with the patient was, in whole, or in part, for the medical condition(s) listed above, which is the primary reason for home health care. Based on my clinical findings: the service(s) are medically necessary, support the need for home health care, and the homebound criteria are met.  I certify that this patient has had a face to face encounter by myself.  Ulisses Abreu M.D. - NPI: [id number]

## 2019-05-13 NOTE — DISCHARGE PLANNING
Agency/Facility Name: [facility]  Spoke To: Fax notification  Outcome: Patient accepted.

## 2019-05-13 NOTE — PROGRESS NOTES
Infectious Disease Progress Note    Author: Basia Andrade M.D. Date & Time of service: 2019  8:47 AM    Chief Complaint:  R foot infection     Interval History:   Interval 24 hours of Vitals/Labs/Micro,and imaging results reviewed as available. See assessment.     Review of Systems:  Review of Systems   Constitutional: Negative for chills and fever.   Gastrointestinal: Negative for abdominal pain, constipation, diarrhea, nausea and vomiting.   Musculoskeletal: Negative for joint pain and myalgias.       Hemodynamics:  Temp (24hrs), Av.7 °C (98.1 °F), Min:36.3 °C (97.3 °F), Max:37.4 °C (99.4 °F)  Temperature: 37.4 °C (99.4 °F)  Pulse  Av  Min: 54  Max: 75  Blood Pressure : 153/77       Physical Exam:  Physical Exam   Constitutional: He is oriented to person, place, and time. He appears well-developed and well-nourished.   HENT:   Head: Normocephalic and atraumatic.   Eyes: Pupils are equal, round, and reactive to light. Conjunctivae and EOM are normal.   Cardiovascular: Normal rate, regular rhythm and normal heart sounds.    Pulmonary/Chest: Effort normal and breath sounds normal.   Abdominal: Soft. Bowel sounds are normal. He exhibits no distension. There is no tenderness. There is no rebound and no guarding.   Musculoskeletal: He exhibits edema and deformity.   R toe amputation site with stitches in place, edema, some duskiness, no tenderness, no warmth, no obvious drainage.  Foot with edema, no erythema or tenderness.   Neurological: He is alert and oriented to person, place, and time.   Skin: Skin is warm and dry.   Psychiatric: He has a normal mood and affect. His behavior is normal.       Meds:    Current Facility-Administered Medications:   •  vancomycin  •  apixaban  •  levothyroxine  •  bumetanide  •  atorvastatin  •  atenolol  •  senna-docusate **AND** polyethylene glycol/lytes **AND** magnesium hydroxide **AND** bisacodyl  •  acetaminophen  •  ampicillin-sulbactam (UNASYN) IV  •   MD Alert...Vancomycin per Pharmacy    Labs:  Recent Labs      05/11/19   1514  05/12/19   0852  05/13/19   0427   WBC  11.4*  11.3*  8.8   RBC  4.52*  4.46*  4.32*   HEMOGLOBIN  14.4  14.0  13.8*   HEMATOCRIT  43.5  44.2  42.3   MCV  96.2  99.1*  97.9*   MCH  31.9  31.4  31.9   RDW  45.8  45.9  46.6   PLATELETCT  209  181  176   MPV  9.0  9.0  9.2     Recent Labs      05/11/19   1452  05/12/19   0852  05/13/19   0427   SODIUM  140  141  138   POTASSIUM  3.9  3.7  3.5*   CHLORIDE  103  103  103   CO2  30  29  28   GLUCOSE  137*  143*  134*   BUN  14  13  16     Recent Labs      05/11/19   1452  05/12/19   0852  05/13/19   0427   CREATININE  1.16  1.07  1.05       Imaging:  No results found.    Micro:  Results     Procedure Component Value Units Date/Time    CULTURE TISSUE W/ GRM STAIN [[id number]] Collected:  05/10/19 1326    Order Status:  Completed Specimen:  Bone Updated:  05/12/19 1113     Significant Indicator NEG     Source BONE     Site Right 2nd Toe     Culture Result No growth at 48 hours.     Gram Stain Result Few WBCs.  No organisms seen.      Narrative:       Surgery Specimen    Anaerobic Culture [[id number]] Collected:  05/10/19 1326    Order Status:  Completed Specimen:  Bone Updated:  05/12/19 1113     Significant Indicator NEG     Source BONE     Site Right 2nd Toe     Culture Result Culture in progress.    Narrative:       Surgery Specimen    GRAM STAIN [[id number]] Collected:  05/10/19 1326    Order Status:  Completed Specimen:  Bone Updated:  05/10/19 2131     Significant Indicator .     Source BONE     Site Right 2nd Toe     Gram Stain Result Few WBCs.  No organisms seen.      Narrative:       Surgery Specimen    BLOOD CULTURE x2 [[id number]] Collected:  05/09/19 1958    Order Status:  Completed Specimen:  Blood from Peripheral Updated:  05/10/19 0958     Significant Indicator NEG     Source BLD     Site PERIPHERAL     Culture Result No Growth  Note: Blood cultures are incubated for 5 days and  are  "monitored continuously.Positive blood cultures  are called to the RN and reported as soon as  they are identified.      Narrative:       Per Hospital Policy: Only change Specimen Src: to \"Line\" if  specified by physician order.  Left AC    BLOOD CULTURE x2 [[id number]] Collected:  05/09/19 2000    Order Status:  Completed Specimen:  Blood from Peripheral Updated:  05/10/19 0957     Significant Indicator NEG     Source BLD     Site PERIPHERAL     Culture Result No Growth  Note: Blood cultures are incubated for 5 days and  are monitored continuously.Positive blood cultures  are called to the RN and reported as soon as  they are identified.      Narrative:       Per Hospital Policy: Only change Specimen Src: to \"Line\" if  specified by physician order.  Right Forearm/Arm          Assessment:  Active Hospital Problems    Diagnosis   • *Toe infection [L08.9]   • Factor V Leiden (HCC) [D68.51]   • Hyperglycemia [R73.9]   • Hypothyroidism [E03.9]   • HLD (hyperlipidemia) [E78.5]   • HTN (hypertension) [I10]   • History of pulmonary embolism [Z86.711]   • History of DVT (deep vein thrombosis) [Z86.718]         Plan:  ASSESSMENT/PLAN:      Castillo Echevarria Jr. is a 85 y.o.  admitted 5/9/2019. Pt has a past medical history of borderline diabetes, hypertension, hypothyroidism, hyperlipidemia, right hip replacement and factor V Leyden disease with history of pulmonary emboli and DVT.  He reports several weeks of worsening symptoms since toenail came off the right second toe.  He was seen by podiatry and started on clindamycin.  Despite antibiotics for approximately 5 days the toe continued to have increased swelling, erythema and then purulent discharge.  He was seen in urgent care and orthopedic clinic in the due to concern for OM  from outpatient x-rays was sent to the emergency room.      Hospital Course:   He is been afebrile, vitals within normal limits.  No leukocytosis.  No ESR, cardiac CPR of 43.  Blood cultures " obtained and no growth.  He is taking the OR on 5/9 with right second toe amputation at the MTP joint and right foot I&D.  He was initially started on Unasyn and vancomycin.  He was given a dose of cefazolin for surgery.     Interval 24 hour assessment:    AF, O2 RA,    Labs reviewed  Micro reviewed    Pt continued on Unasyn and vancomycin since 5/9.   Patient doing well with no specific complaints.  He still has edema in his right foot but wife says this is chronic.  The amputation site with some duskiness and edema but improved per surgical team.      Right foot cellulitis - improved   Right second toe cellulitis and osteomyelitis per outside imaging  -Worsened on clindamycin  -Status post right toe amputation and MTP joint on 5/9     ----Continue vancomycin and Unasyn while awaiting cultures  ----Follow-up or cultures-no growth at 72 hours- final   ----Follow-up pathology-if margins clear will be able to significantly reduce antibiotic duration-Per pathology department will likely have results either today or tomorrow  ----No PICC line yet -may have decreased duration or oral options  ----Follow labs particularly renal function in this elderly gentleman on vancomycin  --- Patient has been on clindamycin and currently on IV antibiotics, monitor for symptoms of C. Difficile such as frequens loose stools     Factor V Leiden disease and history of DVTs and PEs  -Monitor for symptoms of emboli  -Patient on apixaban            Plan of care discussed with JAX Abreu M.D.. Will continue to follow     Basia Andrade M.D.

## 2019-05-14 VITALS
SYSTOLIC BLOOD PRESSURE: 173 MMHG | OXYGEN SATURATION: 98 % | HEIGHT: 73 IN | HEART RATE: 55 BPM | BODY MASS INDEX: 39.82 KG/M2 | RESPIRATION RATE: 17 BRPM | DIASTOLIC BLOOD PRESSURE: 71 MMHG | TEMPERATURE: 97.1 F | WEIGHT: 300.49 LBS

## 2019-05-14 LAB
ANION GAP SERPL CALC-SCNC: 11 MMOL/L (ref 0–11.9)
BACTERIA BLD CULT: NORMAL
BACTERIA BLD CULT: NORMAL
BUN SERPL-MCNC: 17 MG/DL (ref 8–22)
CALCIUM SERPL-MCNC: 8.7 MG/DL (ref 8.5–10.5)
CHLORIDE SERPL-SCNC: 108 MMOL/L (ref 96–112)
CO2 SERPL-SCNC: 24 MMOL/L (ref 20–33)
CREAT SERPL-MCNC: 0.96 MG/DL (ref 0.5–1.4)
ERYTHROCYTE [DISTWIDTH] IN BLOOD BY AUTOMATED COUNT: 45.7 FL (ref 35.9–50)
GLUCOSE SERPL-MCNC: 146 MG/DL (ref 65–99)
HCT VFR BLD AUTO: 41.6 % (ref 42–52)
HGB BLD-MCNC: 13.7 G/DL (ref 14–18)
MCH RBC QN AUTO: 31.6 PG (ref 27–33)
MCHC RBC AUTO-ENTMCNC: 32.9 G/DL (ref 33.7–35.3)
MCV RBC AUTO: 96.1 FL (ref 81.4–97.8)
PLATELET # BLD AUTO: 184 K/UL (ref 164–446)
PMV BLD AUTO: 9.4 FL (ref 9–12.9)
POTASSIUM SERPL-SCNC: 3.7 MMOL/L (ref 3.6–5.5)
RBC # BLD AUTO: 4.33 M/UL (ref 4.7–6.1)
SIGNIFICANT IND 70042: NORMAL
SIGNIFICANT IND 70042: NORMAL
SITE SITE: NORMAL
SITE SITE: NORMAL
SODIUM SERPL-SCNC: 143 MMOL/L (ref 135–145)
SOURCE SOURCE: NORMAL
SOURCE SOURCE: NORMAL
WBC # BLD AUTO: 8.8 K/UL (ref 4.8–10.8)

## 2019-05-14 PROCEDURE — A9270 NON-COVERED ITEM OR SERVICE: HCPCS | Performed by: INTERNAL MEDICINE

## 2019-05-14 PROCEDURE — 99232 SBSQ HOSP IP/OBS MODERATE 35: CPT | Performed by: INTERNAL MEDICINE

## 2019-05-14 PROCEDURE — 700102 HCHG RX REV CODE 250 W/ 637 OVERRIDE(OP): Performed by: INTERNAL MEDICINE

## 2019-05-14 PROCEDURE — 700105 HCHG RX REV CODE 258: Performed by: HOSPITALIST

## 2019-05-14 PROCEDURE — 80048 BASIC METABOLIC PNL TOTAL CA: CPT

## 2019-05-14 PROCEDURE — 99239 HOSP IP/OBS DSCHRG MGMT >30: CPT | Performed by: FAMILY MEDICINE

## 2019-05-14 PROCEDURE — A9270 NON-COVERED ITEM OR SERVICE: HCPCS | Performed by: HOSPITALIST

## 2019-05-14 PROCEDURE — 700111 HCHG RX REV CODE 636 W/ 250 OVERRIDE (IP): Performed by: INTERNAL MEDICINE

## 2019-05-14 PROCEDURE — 700111 HCHG RX REV CODE 636 W/ 250 OVERRIDE (IP): Performed by: HOSPITALIST

## 2019-05-14 PROCEDURE — 85027 COMPLETE CBC AUTOMATED: CPT

## 2019-05-14 PROCEDURE — 36415 COLL VENOUS BLD VENIPUNCTURE: CPT

## 2019-05-14 PROCEDURE — 700102 HCHG RX REV CODE 250 W/ 637 OVERRIDE(OP): Performed by: HOSPITALIST

## 2019-05-14 PROCEDURE — 700105 HCHG RX REV CODE 258: Performed by: INTERNAL MEDICINE

## 2019-05-14 RX ORDER — AMOXICILLIN AND CLAVULANATE POTASSIUM 875; 125 MG/1; MG/1
1 TABLET, FILM COATED ORAL 2 TIMES DAILY
Qty: 14 TAB | Refills: 0 | Status: SHIPPED | OUTPATIENT
Start: 2019-05-14 | End: 2019-05-21

## 2019-05-14 RX ORDER — SULFAMETHOXAZOLE AND TRIMETHOPRIM 800; 160 MG/1; MG/1
1 TABLET ORAL 2 TIMES DAILY
Qty: 14 TAB | Refills: 0 | Status: SHIPPED | OUTPATIENT
Start: 2019-05-14 | End: 2019-05-21

## 2019-05-14 RX ADMIN — ATENOLOL 25 MG: 50 TABLET ORAL at 04:45

## 2019-05-14 RX ADMIN — VANCOMYCIN HYDROCHLORIDE 700 MG: 100 INJECTION, POWDER, LYOPHILIZED, FOR SOLUTION INTRAVENOUS at 04:43

## 2019-05-14 RX ADMIN — AMPICILLIN SODIUM AND SULBACTAM SODIUM 3 G: 2; 1 INJECTION, POWDER, FOR SOLUTION INTRAMUSCULAR; INTRAVENOUS at 03:05

## 2019-05-14 RX ADMIN — AMPICILLIN SODIUM AND SULBACTAM SODIUM 3 G: 2; 1 INJECTION, POWDER, FOR SOLUTION INTRAMUSCULAR; INTRAVENOUS at 08:17

## 2019-05-14 RX ADMIN — BUMETANIDE 1 MG: 1 TABLET ORAL at 04:48

## 2019-05-14 RX ADMIN — APIXABAN 5 MG: 5 TABLET, FILM COATED ORAL at 04:47

## 2019-05-14 RX ADMIN — LEVOTHYROXINE SODIUM 100 MCG: 25 TABLET ORAL at 04:46

## 2019-05-14 ASSESSMENT — ENCOUNTER SYMPTOMS
DIARRHEA: 0
DIAPHORESIS: 0
MYALGIAS: 1
FEVER: 0
ABDOMINAL PAIN: 0
NAUSEA: 0
VOMITING: 0
CHILLS: 0
CONSTIPATION: 0

## 2019-05-14 ASSESSMENT — PAIN SCALES - GENERAL: PAIN_LEVEL: 0

## 2019-05-14 NOTE — PROGRESS NOTES
Patient's IV discontinued and discharge instructions given. Patient discharged home with wife via wheelchair.

## 2019-05-14 NOTE — DISCHARGE PLANNING
Anticipated Discharge Disposition: Home Health     Action: MD placed order for FWW.  LSW met with pt and spouse at bedside.  Pt states he does not need a FWW as he already has one at home to use.  LSW notified bedside RN.  [facility] has accepted the pt and pt is expected to d/c home today.      Barriers to Discharge: None     Plan: Pt to d/c home today with [facility] services.

## 2019-05-14 NOTE — CARE PLAN
Problem: Fluid Volume:  Goal: Will maintain balanced intake and output  Outcome: PROGRESSING AS EXPECTED  The pt is drinking water and is urinating throughout the shift.

## 2019-05-14 NOTE — CARE PLAN
Problem: Respiratory:  Goal: Respiratory status will improve  Outcome: PROGRESSING AS EXPECTED  The pt's lungs are clear and he is not using oxygen at this time and is able to keep his SpO2 within a normal range.

## 2019-05-14 NOTE — PROGRESS NOTES
Infectious Disease Progress Note    Author: Basia Andrade M.D. Date & Time of service: 2019  7:59 AM    Chief Complaint:  R foot infection      Interval History:   Interval 24 hours of Vitals/Labs/Micro,and imaging results reviewed as available. See assessment.    Interval 24 hours of Vitals/Labs/Micro,and imaging results reviewed as available. See assessment.       Review of Systems:  Review of Systems   Constitutional: Negative for chills, diaphoresis, fever and malaise/fatigue.   Gastrointestinal: Negative for abdominal pain, constipation, diarrhea, nausea and vomiting.   Musculoskeletal: Positive for joint pain and myalgias.       Hemodynamics:  Temp (24hrs), Av.2 °C (97.2 °F), Min:36.1 °C (97 °F), Max:36.4 °C (97.5 °F)  Temperature: 36.1 °C (97 °F)  Pulse  Av.7  Min: 54  Max: 75  Blood Pressure : 153/81       Physical Exam:  Physical Exam   Constitutional: He is oriented to person, place, and time. He appears well-developed and well-nourished.   HENT:   Head: Normocephalic and atraumatic.   Eyes: Pupils are equal, round, and reactive to light. Conjunctivae and EOM are normal.   Cardiovascular: Normal rate, regular rhythm and normal heart sounds.    Pulmonary/Chest: Effort normal and breath sounds normal.   Abdominal: Soft. Bowel sounds are normal. He exhibits no distension. There is no tenderness. There is no rebound and no guarding.   Musculoskeletal: He exhibits edema and deformity.   R foot bandaged and in shoe   Neurological: He is alert and oriented to person, place, and time.   Skin: Skin is warm and dry.   Psychiatric: He has a normal mood and affect. His behavior is normal.       Meds:    Current Facility-Administered Medications:   •  vancomycin  •  apixaban  •  levothyroxine  •  bumetanide  •  atorvastatin  •  atenolol  •  senna-docusate **AND** polyethylene glycol/lytes **AND** magnesium hydroxide **AND** bisacodyl  •  acetaminophen  •  ampicillin-sulbactam (UNASYN) IV  •   MD Alert...Vancomycin per Pharmacy    Labs:  Recent Labs      05/12/19   0852  05/13/19   0427  05/14/19   0430   WBC  11.3*  8.8  8.8   RBC  4.46*  4.32*  4.33*   HEMOGLOBIN  14.0  13.8*  13.7*   HEMATOCRIT  44.2  42.3  41.6*   MCV  99.1*  97.9*  96.1   MCH  31.4  31.9  31.6   RDW  45.9  46.6  45.7   PLATELETCT  181  176  184   MPV  9.0  9.2  9.4     Recent Labs      05/12/19   0852  05/13/19   0427  05/14/19   0430   SODIUM  141  138  143   POTASSIUM  3.7  3.5*  3.7   CHLORIDE  103  103  108   CO2  29  28  24   GLUCOSE  143*  134*  146*   BUN  13  16  17     Recent Labs      05/12/19   0852  05/13/19   0427  05/14/19   0430   CREATININE  1.07  1.05  0.96       Imaging:  No results found.    Micro:  Results     Procedure Component Value Units Date/Time    Anaerobic Culture [[id number]] Collected:  05/10/19 1326    Order Status:  Completed Specimen:  Bone Updated:  05/13/19 0858     Significant Indicator NEG     Source BONE     Site Right 2nd Toe     Culture Result Culture in progress.    Narrative:       Surgery Specimen    CULTURE TISSUE W/ GRM STAIN [[id number]] Collected:  05/10/19 1326    Order Status:  Completed Specimen:  Bone Updated:  05/13/19 0858     Significant Indicator NEG     Source BONE     Site Right 2nd Toe     Culture Result No growth at 72 hours.     Gram Stain Result Few WBCs.  No organisms seen.      Narrative:       Surgery Specimen    GRAM STAIN [[id number]] Collected:  05/10/19 1326    Order Status:  Completed Specimen:  Bone Updated:  05/10/19 2131     Significant Indicator .     Source BONE     Site Right 2nd Toe     Gram Stain Result Few WBCs.  No organisms seen.      Narrative:       Surgery Specimen    BLOOD CULTURE x2 [[id number]] Collected:  05/09/19 1958    Order Status:  Completed Specimen:  Blood from Peripheral Updated:  05/10/19 0958     Significant Indicator NEG     Source BLD     Site PERIPHERAL     Culture Result No Growth  Note: Blood cultures are incubated for 5 days and  are  "monitored continuously.Positive blood cultures  are called to the RN and reported as soon as  they are identified.      Narrative:       Per Hospital Policy: Only change Specimen Src: to \"Line\" if  specified by physician order.  Left AC    BLOOD CULTURE x2 [[id number]] Collected:  05/09/19 2000    Order Status:  Completed Specimen:  Blood from Peripheral Updated:  05/10/19 0957     Significant Indicator NEG     Source BLD     Site PERIPHERAL     Culture Result No Growth  Note: Blood cultures are incubated for 5 days and  are monitored continuously.Positive blood cultures  are called to the RN and reported as soon as  they are identified.      Narrative:       Per Hospital Policy: Only change Specimen Src: to \"Line\" if  specified by physician order.  Right Forearm/Arm          Assessment:  Active Hospital Problems    Diagnosis   • *Toe infection [L08.9]   • Factor V Leiden (HCC) [D68.51]   • Hyperglycemia [R73.9]   • Hypothyroidism [E03.9]   • HLD (hyperlipidemia) [E78.5]   • HTN (hypertension) [I10]   • History of pulmonary embolism [Z86.711]   • History of DVT (deep vein thrombosis) [Z86.718]     ASSESSMENT/PLAN:      Castillo Echevarria Jr. is a 85 y.o.  admitted 5/9/2019. Pt has a past medical history of borderline diabetes, hypertension, hypothyroidism, hyperlipidemia, right hip replacement and factor V Leyden disease with history of pulmonary emboli and DVT.  He reports several weeks of worsening symptoms since toenail came off the right second toe.  He was seen by podiatry and started on clindamycin.  Despite antibiotics for approximately 5 days the toe continued to have increased swelling, erythema and then purulent discharge.  He was seen in urgent care and orthopedic clinic in the due to concern for OM  from outpatient x-rays was sent to the emergency room.      Hospital Course:   He is been afebrile, vitals within normal limits.  No leukocytosis.  No ESR, cardiac CPR of 43.  Blood cultures obtained and no " growth.  He is taking the OR on 5/9 with right second toe amputation at the MTP joint and right foot I&D.  He was initially started on Unasyn and vancomycin.  He was given a dose of cefazolin for surgery.      Interval 24 hour assessment:    AF, O2 RA,    Labs reviewed  Micro reviewed    Pt doing well and anticipating discharge. No complaints.      Right foot cellulitis - improved   Right second toe cellulitis and osteomyelitis per outside imaging  -Worsened on clindamycin per pt   -Status post right toe amputation and MTP joint on 5/9  -OR bone cultures are negative and final, pathology report positive for gout with negatively birefringent needlelike crystals noted on polarized examination, negative for acute osteomyelitis      ---- Stop vancomycin and Unasyn, will transition to oral antibiotics Augmentin 875/125 bid and Bactrim 2 DS BID for another 7 days   --- Follow-up in ID clinic in ~ 1 week   --- Patient has been on clindamycin and IV antibiotics, monitor for symptoms of C. Difficile such as frequent loose stools and seek medical care should this occur      Factor V Leiden disease and history of DVTs and PEs  -Monitor for symptoms of emboli  -Patient on apixaban            Plan of care discussed with Dr. Marcial.  Will sign off.      Basia Andrade M.D.

## 2019-05-14 NOTE — CONSULTS
"Diabetes education: Met with pt and wife, who have been told by their PCP, he was \"bordeline\" and blood sugars under 150 ok. Pt here with infection and toe amputation.  Pt was admitted with blood sugar of 125 and Hg A1c of 7.5%. Pt does not have finger sticks or diabetes medications. Glucose readings have been: 125, 237, 137, 143, and 134.   Pt does not meet the criteria for diabetes per Medicare ( Medicare does not use Hg A1c values). Discussed what diabetes was, difference between type two and pre diabetes, what effects blood sugars, and need to eat three meals and hs snack with carbohydrates and proteins.  Plan: Handout given. CDE will continue to follow. Please call [phone] if needs change.  "

## 2019-05-14 NOTE — DISCHARGE PLANNING
Agency/Facility Name: [facility]  Spoke To: Oliva  Outcome: Informed [facility] that patient is expected to discharge home today.       DC Orders sent to [place] at 1500.

## 2019-05-14 NOTE — PROGRESS NOTES
Diabetes education: Met with pt and wife this afternoon. Please see consult note.  Plan: Handout given. CDE will continue to follow. Please call [phone] if needs change.

## 2019-05-14 NOTE — PROGRESS NOTES
"Pharmacy Kinetics 85 y.o. male on vancomycin day # 5  2019    Currently on Vancomycin 1000mg IV q12h   (0400, 1600)     Indication for Treatment: OM     Pertinent history per medical record: Admitted on 2019 for foot infection/osteomyelitis. Presents with erythema, drainage and swelling of right second toe with s/sx over the past 2 1/2 weeks.  Pt reported to [facility] where imaging showed osteomyelitis. Per ortho - 5/10 OR for InD and 2nd toe amputation.     Other antibiotics: ampicillin/sulbactam 3g IV q6h     Allergies: Patient has no known allergies.      List concerns for renal function: age, obesity (BMI 39)     Pertinent cultures to date:   19: Blood x2 - NGTD  5/10/19: R 2nd toe - negative     MRSA nares swab if pneumonia is a concern (ordered/positive/negative/n-a): N/A    Recent Labs      19   1514  19   0852  19   0427   WBC  11.4*  11.3*  8.8     Recent Labs      19   1452  19   0852  19   0427   BUN  14  13  16   CREATININE  1.16  1.07  1.05     Recent Labs      19   2234  19   1548   VANCOTROUGH  9.5*  18.0     Intake/Output Summary (Last 24 hours) at 19 1744  Last data filed at 19   Gross per 24 hour   Intake              240 ml   Output                0 ml   Net              240 ml      /74   Pulse (!) 59   Temp 36.4 °C (97.5 °F) (Temporal)   Resp 16   Ht 1.854 m (6' 1\")   Wt (!) 136.3 kg (300 lb 7.8 oz)   SpO2 92%  Temp (24hrs), Av.6 °C (97.9 °F), Min:36.1 °C (97 °F), Max:37.4 °C (99.4 °F)      A/P   1. Vancomycin dose change: 700mg IV q12h   (0400, 1600)  2. Next vancomycin level: ~3-4 days  (not ordered)  3. Goal trough: 12-16 mcg/mL  4. Comments: No leukocytosis and afebrile. Renal function appears stable per renal indices. Trough level is supratherapeutic and may accumulate more. Will reduce dose to ~5mg/kg per protocol. Recommend trough level in a few days to assess accumulation and dose " appropriateness. Pharmacy to continue to follow ID recommendations.      Courtney Martin, PharmD., BCPS

## 2019-05-14 NOTE — DISCHARGE INSTRUCTIONS
Discharge Instructions    Discharged to home by car with relative. Discharged via wheelchair, hospital escort: Yes.  Special equipment needed: Not Applicable    Be sure to schedule a follow-up appointment with your primary care doctor or any specialists as instructed.     Discharge Plan:   Diet Plan: Discussed  Activity Level: Discussed  Confirmed Follow up Appointment: Patient to Call and Schedule Appointment  Confirmed Symptoms Management: Discussed  Medication Reconciliation Updated: Yes  Influenza Vaccine Indication: Not indicated: Previously immunized this influenza season and > 8 years of age    I understand that a diet low in cholesterol, fat, and sodium is recommended for good health. Unless I have been given specific instructions below for another diet, I accept this instruction as my diet prescription.   Other diet: regular    Special Instructions: none    · Is patient discharged on Warfarin / Coumadin?   No     Toe Amputation  Toe amputation is a surgical procedure to remove all or part of a toe. You may have this procedure if:  · Tissue in your toe is dying because of poor blood supply.  · You have a severe infection in your toe.  Removing your toe keeps nearby tissue healthy. If the toe is infected, removing it helps to keep the infection from spreading.  Tell a health care provider about:  · Any allergies you have.  · All medicines you are taking, including vitamins, herbs, eye drops, creams, and over-the-counter medicines.  · Any problems you or family members have had with anesthetic medicines.  · Any blood disorders you have.  · Any surgeries you have had.  · Any medical conditions you have.  · Whether you are pregnant or may be pregnant.  What are the risks?  Generally, this is a safe procedure. However, problems may occur, including:  · Bleeding.  · Buildup of blood and fluid (hematoma).  · Infection.  · Allergic reactions to medicines.  · Tissue death in the flap of skin (flap  necrosis).  · Trouble with healing.  · Minor changes in the way that you walk (your gait).  · Feeling pain in the area that was removed (phantom pain). This is rare.  What happens before the procedure?  · Follow instructions from your health care provider about eating or drinking restrictions.  · Ask your health care provider about:  ¨ Changing or stopping your regular medicines. This is especially important if you are taking diabetes medicines or blood thinners.  ¨ Taking medicines such as aspirin and ibuprofen. These medicines can thin your blood. Do not take these medicines before your procedure if your health care provider instructs you not to.  · You may be given antibiotic medicine to help prevent infection.  · Plan to have someone take you home after the procedure.  · If you will be going home right after the procedure, plan to have someone with you for 24 hours.  What happens during the procedure?  · You will be given one or more of the following:  ¨ A medicine to help you relax (sedative).  ¨ A medicine to numb the area (local anesthetic).  ¨ A medicine to make you fall asleep (general anesthetic).  ¨ A medicine that is injected into your spine to numb the area below and slightly above the injection site (spinal anesthetic).  ¨ A medicine that is injected into an area of your body to numb everything below the injection site (regional anesthetic).  · To reduce your risk of infection:  ¨ Your health care team will wash or sanitize their hands.  ¨ Your skin will be washed with soap.  · Your surgeon will shon the area of your toe for removal.  · Your surgeon will make a surgical cut (incision) in your toe.  · The dead tissue and bone will be removed.  · Nerves and vessels will be tied or heated with a special tool to stop bleeding.  · The area will be drained and cleaned.  · If only part of a toe is removed, the remaining part will be covered with a flap of skin.  · The incision will be treated in one of these  ways:  ¨ It will be closed with stitches (sutures).  ¨ It will be left open to heal if there is an infection.  · The incision area may be packed with gauze and covered with bandages (dressings).  · Tissue samples may be sent to a lab to be examined under a microscope.  The procedure may vary among health care providers and hospitals.  What happens after the procedure?  · Your blood pressure, heart rate, breathing rate, and blood oxygen level will be monitored often until the medicines you were given have worn off.  · Your foot will be raised up high (elevated) to relieve swelling.  · You will be monitored for pain.  · You will be given pain medicines and antibiotics.  · Your health care provider or physical therapist will help you to move around as soon as possible.  · Do not drive for 24 hours if you received a sedative.  This information is not intended to replace advice given to you by your health care provider. Make sure you discuss any questions you have with your health care provider.  Document Released: 11/28/2016 Document Revised: 08/21/2017 Document Reviewed: 09/10/2016  [employer] Interactive Patient Education © 2017 [employer]      Depression / Suicide Risk    As you are discharged from this [facility] facility, it is important to learn how to keep safe from harming yourself.    Recognize the warning signs:  · Abrupt changes in personality, positive or negative- including increase in energy   · Giving away possessions  · Change in eating patterns- significant weight changes-  positive or negative  · Change in sleeping patterns- unable to sleep or sleeping all the time   · Unwillingness or inability to communicate  · Depression  · Unusual sadness, discouragement and loneliness  · Talk of wanting to die  · Neglect of personal appearance   · Rebelliousness- reckless behavior  · Withdrawal from people/activities they love  · Confusion- inability to concentrate     If you or a loved one observes any of  these behaviors or has concerns about self-harm, here's what you can do:  · Talk about it- your feelings and reasons for harming yourself  · Remove any means that you might use to hurt yourself (examples: pills, rope, extension cords, firearm)  · Get professional help from the community (Mental Health, Substance Abuse, psychological counseling)  · Do not be alone:Call your Safe Contact- someone whom you trust who will be there for you.  · Call your local CRISIS HOTLINE 576-7243 or 781-539-6471  · Call your local Children's Mobile Crisis Response Team [place] (065) 314-5225 or www.[employer]  · Call the toll free National Suicide Prevention Hotlines   · National Suicide Prevention Lifeline 506-343-ONJM (7518)  · National Hope Line Network 800-SUICIDE (615-3089)

## 2019-05-15 LAB
BACTERIA SPEC ANAEROBE CULT: NORMAL
SIGNIFICANT IND 70042: NORMAL
SITE SITE: NORMAL
SOURCE SOURCE: NORMAL

## 2019-05-15 NOTE — ANESTHESIA QCDR
2019 [facility] Clinical Data Registry (for Quality Improvement)     Postoperative nausea/vomiting risk protocol (Adult = 18 yrs and Pediatric 3-17 yrs)- (430 and 463)  General inhalation anesthetic (NOT TIVA) with PONV risk factors: Yes  Provision of anti-emetic therapy with at least 2 different classes of agents: Yes   Patient DID NOT receive anti-emetic therapy and reason is documented in Medical Record:  N/A    Multimodal Pain Management- (AQI59)  Patient undergoing Elective Surgery (i.e. Outpatient, or ASC, or Prescheduled Surgery prior to Hospital Admission): Yes  Use of Multimodal Pain Management, two or more drugs and/or interventions, NOT including systemic opioids: Yes   Exception: Documented allergy to multiple classes of analgesics:  N/A    PACU assessment of acute postoperative pain prior to Anesthesia Care End- Applies to Patients Age = 18- (ABG7)  Initial PACU pain score is which of the following: < 7/10  Patient unable to report pain score: N/A    Post-anesthetic transfer of care checklist/protocol to PACU/ICU- (426 and 427)  Upon conclusion of case, patient transferred to which of the following locations: PACU/Non-ICU  Use of transfer checklist/protocol: Yes  Exclusion: Service Performed in Patient Hospital Room (and thus did not require transfer): N/A    PACU Reintubation- (AQI31)  General anesthesia requiring endotracheal intubation (ETT) along with subsequent extubation in OR or PACU: Yes  Required reintubation in the PACU: No   Extubation was a planned trial documented in the medical record prior to removal of the original airway device:  N/A    Unplanned admission to ICU related to anesthesia service up through end of PACU care- (MD51)  Unplanned admission to ICU (not initially anticipated at anesthesia start time): No

## 2019-05-15 NOTE — ANESTHESIA POSTPROCEDURE EVALUATION
Patient: Castillo Echevarria Jr.    Procedure Summary     Date:  05/10/19 Room / Location:  [place] [address] / SURGERY [facility]    Anesthesia Start:  1303 Anesthesia Stop:  1345    Procedure:  AMPUTATION, TOE - 2nd toe (Right Toe) Diagnosis:  (Right second toe infection )    Surgeon:  Wood Bullock M.D. Responsible Provider:  Tobey Gansert, M.D.    Anesthesia Type:  general ASA Status:  2          Final Anesthesia Type: general  Last vitals  BP   Blood Pressure : (!) 173/71    Temp   36.2 °C (97.1 °F)    Pulse   Pulse: (!) 55   Resp   17    SpO2   98 %      Anesthesia Post Evaluation    Patient location during evaluation: PACU  Patient participation: complete - patient participated  Level of consciousness: awake and alert  Pain score: 0    Airway patency: patent  Anesthetic complications: no  Cardiovascular status: hemodynamically stable  Respiratory status: acceptable  Hydration status: euvolemic    PONV: none           Nurse Pain Score: 0 (NPRS)

## 2019-05-15 NOTE — DISCHARGE SUMMARY
Discharge Summary    CHIEF COMPLAINT ON ADMISSION  Chief Complaint   Patient presents with   • Wound Infection     2nd toe on rt foot is infected and failed outpt tx.  [facility] sent him to [facility] to be admitted for IV ABX and toe amputation       Reason for Admission  sent by the [facility]     Admission Date  5/9/2019    CODE STATUS  Prior    HPI & HOSPITAL COURSE  This is a 85 y.o. male here with no infection.  Infectious disease and limb preservation service were consulted, patient underwent toe amputation.  Patient was placed on initially of IV vancomycin and Unasyn by infectious disease.  His cultures have been negative.  He will now be transitioned to oral antibiotics in the form of Augmentin and Bactrim per ID recommendations.  He has known history of factor V Leiden, history of pulmonary embolism and DVT, his anticoagulation was held prior to surgery.  He has been restarted at this point.  Also has no history of hyperglycemia or prediabetes.  Hemoglobin A1c 7.5.  We will not discharged on any medications at this point will he will discuss with primary care physician.  He may just need close monitoring in the diabetic diet.  Diabetic education was consulted on this case.  Health was arranged for his wound care.       Therefore, he is discharged in good and stable condition to home with organized home healthcare and close outpatient follow-up.    The patient met 2-midnight criteria for an inpatient stay at the time of discharge.    Discharge Date  5/14/2019    FOLLOW UP ITEMS POST DISCHARGE  Follow-up with infectious disease    DISCHARGE DIAGNOSES  Principal Problem:    Toe infection POA: Unknown  Active Problems:    Factor V Leiden (HCC) POA: Unknown    HTN (hypertension) POA: Yes    History of pulmonary embolism POA: Yes    History of DVT (deep vein thrombosis) POA: Yes    Hypothyroidism POA: Unknown    HLD (hyperlipidemia) POA: Unknown    Hyperglycemia POA: Unknown  Resolved Problems:    * No resolved hospital  problems. *      FOLLOW UP  Future Appointments  Date Time Provider Department Center   5/20/2019 11:20 AM Lucy Sanders M.D. [address] SERVICES  [address] [place] [id number] [phone]          MEDICATIONS ON DISCHARGE     Medication List      START taking these medications      Instructions   amoxicillin-clavulanate 875-125 MG Tabs  Commonly known as:  AUGMENTIN   Take 1 Tab by mouth 2 times a day for 7 days.  Dose:  1 Tab     sulfamethoxazole-trimethoprim 800-160 MG tablet  Commonly known as:  BACTRIM DS   Take 1 Tab by mouth 2 times a day for 7 days.  Dose:  1 Tab        CONTINUE taking these medications      Instructions   atenolol 25 MG Tabs  Commonly known as:  TENORMIN   Take 25 mg by mouth every day.  Dose:  25 mg     atorvastatin 10 MG Tabs  Commonly known as:  LIPITOR   Take 10 mg by mouth every evening.  Dose:  10 mg     bumetanide 1 MG Tabs  Commonly known as:  BUMEX   Take 1 mg by mouth every day.  Dose:  1 mg     ELIQUIS 2.5mg Tabs  Generic drug:  apixaban   Take 2.5 mg by mouth 2 Times a Day.  Dose:  2.5 mg     levothyroxine 100 MCG Tabs  Commonly known as:  SYNTHROID   Take 100 mcg by mouth every day.  Dose:  100 mcg     OCUVITE-LUTEIN Tabs   Take 1 tablet by mouth every day.  Dose:  1 tablet        STOP taking these medications    clindamycin 300 MG Caps  Commonly known as:  CLEOCIN            Allergies  No Known Allergies    DIET  No orders of the defined types were placed in this encounter.      ACTIVITY  As tolerated.  Weight bearing as tolerated    CONSULTATIONS  Limb preservation service  ID - [place]    PROCEDURES  s/p Right second toe amputation of the metatarsophalangeal joint. Right foot irrigation and debridement, dorsal and plantar compartments. 5/10/2019    LABORATORY  Lab Results   Component Value Date    SODIUM 143 05/14/2019    POTASSIUM 3.7 05/14/2019    CHLORIDE 108 05/14/2019    CO2 24 05/14/2019    GLUCOSE 146 (H) 05/14/2019    BUN 17  05/14/2019    CREATININE 0.96 05/14/2019        Lab Results   Component Value Date    WBC 8.8 05/14/2019    HEMOGLOBIN 13.7 (L) 05/14/2019    HEMATOCRIT 41.6 (L) 05/14/2019    PLATELETCT 184 05/14/2019        Total time of the discharge process exceeds 40 minutes.

## 2019-05-20 ENCOUNTER — OFFICE VISIT (OUTPATIENT)
Dept: INFECTIOUS DISEASES | Facility: MEDICAL CENTER | Age: 84
End: 2019-05-20
Payer: MEDICARE

## 2019-05-20 VITALS
TEMPERATURE: 97.4 F | HEIGHT: 73 IN | OXYGEN SATURATION: 93 % | BODY MASS INDEX: 37.11 KG/M2 | WEIGHT: 280 LBS | DIASTOLIC BLOOD PRESSURE: 60 MMHG | HEART RATE: 59 BPM | SYSTOLIC BLOOD PRESSURE: 110 MMHG

## 2019-05-20 DIAGNOSIS — Z89.422 STATUS POST AMPUTATION OF TOE OF LEFT FOOT (HCC): ICD-10-CM

## 2019-05-20 DIAGNOSIS — M10.9 GOUT, UNSPECIFIED CAUSE, UNSPECIFIED CHRONICITY, UNSPECIFIED SITE: ICD-10-CM

## 2019-05-20 DIAGNOSIS — Z86.718 HISTORY OF DVT (DEEP VEIN THROMBOSIS): ICD-10-CM

## 2019-05-20 PROCEDURE — 99202 OFFICE O/P NEW SF 15 MIN: CPT | Performed by: INTERNAL MEDICINE

## 2019-05-20 NOTE — PROGRESS NOTES
"Chief Complaint   Patient presents with   • Hospital Follow-up     Right foot cellulitis        Infectious Disease clinic follow-up:  Patient is a 85 y.o. male in the clinic today for ID FU appointment.   85-year-old male who was seen in consultation as an inpatient for right foot infection.  He has history of borderline diabetes, hypertension, factor V Leyden disease.  The toenail had come off his right second toe and he was seen by podiatry and started on clindamycin.  Despite antibiotics his toe was showing increasing swelling and erythema and purulent discharge.  He underwent right toe amputation and metatarsal phalangeal joint on 5/9/2019.  Or bone cultures were negative and final pathology report was positive for gout.  He was discharged on Augmentin and Bactrim for 7 days.  5/20/2019-patient has come back for follow-up.  Denies any fevers or chills.  The swelling of the legs seems to have decreased after the surgery.  Patient is accompanied by his wife.  They are very concerned about the wound.  Overall he is feeling well.  No nausea vomiting diarrhea no cough no shortness of breath.  Primary Care Provider: Yosi Thornton M.D.     REVIEW OF SYSTEMS:    As in my HPI    ALLERGIES:  No Known Allergies     PAST MEDICAL HISTORY:   Past Medical History:   Diagnosis Date   • Anesthesia     \" slow to recover\"   • Anticoagulation monitoring, special range    • Blood clotting disorder (HCC)     Factor 5 Leiden   • Cataract    • Hemorrhagic disorder (HCC)     xarelto   • High cholesterol    • History of DVT (deep vein thrombosis) 9/29/2014   • History of pulmonary embolism 9/29/2014   • History of sleep apnea 9/29/2014   • HTN (hypertension) 9/29/2014   • Hypercoagulable state (HCC) 9/29/2014   • Pulmonary disease    • Sleep apnea        PAST SURGICAL HISTORY:    Past Surgical History:   Procedure Laterality Date   • TOE AMPUTATION Right 5/10/2019    Procedure: AMPUTATION, TOE - 2nd toe;  Surgeon: Wood Bullock M.D.;  " "Location: SURGERY [facility];  Service: Orthopedics   • CATARACT PHACO WITH IOL Right 1/17/2017    Procedure: CATARACT PHACO WITH IOL;  Surgeon: Jero Hinson M.D.;  Location: SURGERY [facility];  Service:    • OTHER ORTHOPEDIC SURGERY Right 2016    total hip   • OTHER  2007    left eye cataract   • OTHER  2006    eye lid clip   • OTHER NEUROLOGICAL SURG  2000    brain clot   • CERVICAL LAMINECTOMY POSTERIOR          MEDICATIONS:   Current Outpatient Prescriptions   Medication Sig Dispense Refill   • amoxicillin-clavulanate (AUGMENTIN) 875-125 MG Tab Take 1 Tab by mouth 2 times a day for 7 days. 14 Tab 0   • sulfamethoxazole-trimethoprim (BACTRIM DS) 800-160 MG tablet Take 1 Tab by mouth 2 times a day for 7 days. 14 Tab 0   • apixaban (ELIQUIS) 2.5mg Tab Take 2.5 mg by mouth 2 Times a Day.     • bumetanide (BUMEX) 1 MG Tab Take 1 mg by mouth every day.     • atorvastatin (LIPITOR) 10 MG Tab Take 10 mg by mouth every evening.     • Multiple Vitamins-Minerals (OCUVITE-LUTEIN) Tab Take 1 tablet by mouth every day.     • atenolol (TENORMIN) 25 MG TABS Take 25 mg by mouth every day.     • levothyroxine (SYNTHROID) 100 MCG TABS Take 100 mcg by mouth every day.       No current facility-administered medications for this visit.         LABORATORY DATA:   Pathology shows gout     PHYSICAL EXAMINATION:PE:      /60 (BP Location: Right arm, Patient Position: Sitting, BP Cuff Size: Large adult)   Pulse (!) 59   Temp 36.3 °C (97.4 °F) (Temporal)   Ht 1.854 m (6' 1\")   Wt (!) 127 kg (280 lb)   SpO2 93%   BMI 36.94 kg/m²        Constitutional: patient is oriented to person, place, and time. He appears well-developed and well-nourished. No distress obese  Eyes: Conjunctivae normal and EOM are normal. Pupils are equal, round, and reactive to light.   Mouth/Throat: Lips without lesions, good dentition, oropharynx is clear and moist.  Neck: Trachea midline. Normal range of motion. Neck supple. No " masses  Cardiovascular: Normal rate, regular rhythm, normal heart sounds and intact distal pulses. No murmur, gallop, or friction rub. No edema.  Pulmonary/Chest: No respiratory distress. Unlabored respiratory effort, lungs clear to auscultation. No wheezes or rales.   Abdominal: Soft, non tender. BS + x 4. No masses or hepatosplenomegaly.   Musculoskeletal: Some edema of the lower extremities.  The wound at the site of the toe shows no drainage.  The stitches are in place but there is significant maceration.  Neurological: He is alert and oriented to person, place, and time. No cranial nerve deficit. Coordination normal.   Skin: Skin is warm and dry. Good turgor. No rashes visable.  Psychiatric: He has a normal mood and affect. His behavior is normal.        ASSESSMENT:  1. 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 Gout, unspecified cause, unspecified chronicity, unspecified site     3. History of DVT (deep vein thrombosis)          RECOMMENDATIONS:      At this time I would recommend that patient leaves his wound open to air for some time since there is significant maceration.  This should be done while protecting the wound.  There is no active infection.  Patient and his wife are very active and involved in his care.  They were advised to seek help immediately if there is drainage from the wound.  Continue follow-up with orthopedics.  No further antibiotics are needed.  We will see him back as needed    No Follow-up on file.

## 2019-05-28 ENCOUNTER — NON-PROVIDER VISIT (OUTPATIENT)
Dept: WOUND CARE | Facility: MEDICAL CENTER | Age: 84
End: 2019-05-28
Attending: INTERNAL MEDICINE
Payer: MEDICARE

## 2019-05-28 PROCEDURE — 97597 DBRDMT OPN WND 1ST 20 CM/<: CPT

## 2019-05-28 NOTE — CERTIFICATION
"Non Provider Encounter- Diabetic Foot Ulcer      HISTORY OF PRESENT ILLNESS    START OF CARE IN CLINIC: 05/28/19    REFERRING PROVIDER: Ulisses Abreu MD ([facility] hospitalist)     WOUND- Diabetic foot ulcer - see DM details below   LOCATION: right 2nd toe, medial and lateral   WOUND HISTORY: Pt's right 2nd toenail fell off mid-April 2019.  The toe became infected.  Pt was sent by urgent care to ER and was admitted to [facility] 5/9/19.  On 5/10/19 he underwent right 2nd toe amputation with Dr. Bullock.  He was discharged home to [place] on 5/14/19.    PERTINENT PMH: Hx borderline dm (Not on medication.  To f/u with pcp in [place].), hypothyroidism, hypertension, Factor V Leiden on chronic anticoag with hx DVT and PE.       IMAGING:none pertinent   VASCULAR STUDIES:none in epic.  Right dp/pt not palpable manually (likely due to edema).  Right dp/pt biphasic and strong per doppler in clinic.       LAST  WOUND CULTURE DATE : 5/10/19 bone cx negative.  Abx per ID (finished home Augmentin and Bactrim.  No antibiotics at this time.).     OFFLOADING: None    Most recent A1c:  7.5 DATE 5/12/19      TOBACCO USE: none      RESULTS:   None    CLINIC OMAR: Right dp/pt not palpable manually (likely due to edema).  Right dp/pt biphasic and strong per doppler in clinic.     FALL RISK ASSESSMENT-  High fall risk   X 65 years or older     Fall within the last 2 years   Uses ambulatory devices  X Loss of protective sensation in feet   Use of prostethic/orthotic    X Presence of lower extremity/foot/toe amputation   X Taking medication that increases risk (per facility policy)    Interventions Recommended (if any of the above are selected):   Use of Assistive Device:   Supervision with ambulation: Caregiver   Assistance with ambulation: Caregiver   Home safety education: Educational material provided    PAST MEDICAL HISTORY:   Past Medical History:   Diagnosis Date   • Anesthesia     \" slow to recover\"   • Anticoagulation monitoring, " special range    • Blood clotting disorder (HCC)     Factor 5 Leiden   • Cataract    • Hemorrhagic disorder ([facility])     xarelto   • High cholesterol    • History of DVT (deep vein thrombosis) 9/29/2014   • History of pulmonary embolism 9/29/2014   • History of sleep apnea 9/29/2014   • HTN (hypertension) 9/29/2014   • Hypercoagulable state (HCC) 9/29/2014   • Pulmonary disease    • Sleep apnea        PAST SURGICAL HISTORY:   Past Surgical History:   Procedure Laterality Date   • TOE AMPUTATION Right 5/10/2019    Procedure: AMPUTATION, TOE - 2nd toe;  Surgeon: Wood Bullock M.D.;  Location: SURGERY [facility] ORS;  Service: Orthopedics   • CATARACT PHACO WITH IOL Right 1/17/2017    Procedure: CATARACT PHACO WITH IOL;  Surgeon: Jero Hinson M.D.;  Location: SURGERY [facility] ORS;  Service:    • OTHER ORTHOPEDIC SURGERY Right 2016    total hip   • OTHER  2007    left eye cataract   • OTHER  2006    eye lid clip   • OTHER NEUROLOGICAL SURG  2000    brain clot   • CERVICAL LAMINECTOMY POSTERIOR          MEDICATIONS:   Current Outpatient Prescriptions   Medication   • apixaban (ELIQUIS) 2.5mg Tab   • bumetanide (BUMEX) 1 MG Tab   • atorvastatin (LIPITOR) 10 MG Tab   • Multiple Vitamins-Minerals (OCUVITE-LUTEIN) Tab   • atenolol (TENORMIN) 25 MG TABS   • levothyroxine (SYNTHROID) 100 MCG TABS     No current facility-administered medications for this visit.        ALLERGIES:  No Known Allergies      SOCIAL HISTORY:   Social History     Social History   • Marital status:      Spouse name: N/A   • Number of children: N/A   • Years of education: N/A     Social History Main Topics   • Smoking status: Never Smoker   • Smokeless tobacco: Never Used   • Alcohol use 3.0 oz/week     6 Standard drinks or equivalent per week      Comment: 1 oz   • Drug use: No   • Sexual activity: Not on file     Other Topics Concern   • Not on file     Social History Narrative   • No narrative on file       FAMILY HISTORY:   Family  History   Problem Relation Age of Onset   • Other Mother    • Other Father    • Hypertension Father    • Heart Disease Neg Hx            WOUND ASSESSMENT   Wound 05/28/19 Full Thickness Wound right 2nd toe, medial (Active)   Wound Image       5/28/2019  2:00 PM   Site Assessment Pink;Red 5/28/2019  2:00 PM   Ruthann-wound Assessment Edema;Intact 5/28/2019  2:00 PM   Margins Attached edges 5/28/2019  2:00 PM   Wound Length (cm) 1.8 cm 5/28/2019  2:00 PM   Wound Width (cm) 0.8 cm 5/28/2019  2:00 PM   Wound Depth (cm) 0.2 cm 5/28/2019  2:00 PM   Wound Surface Area (cm^2) 1.44 cm^2 5/28/2019  2:00 PM   Tunneling 0 cm 5/28/2019  2:00 PM   Undermining 0 cm 5/28/2019  2:00 PM   Closure Secondary intention 5/28/2019  2:00 PM   Drainage Amount Moderate 5/28/2019  2:00 PM   Drainage Description Serosanguineous 5/28/2019  2:00 PM   Non-staged Wound Description Full thickness 5/28/2019  2:00 PM   Treatments Cleansed 5/28/2019  2:00 PM   Cleansing Approved Wound Cleanser 5/28/2019  2:00 PM   Periwound Protectant Skin Protectant wipes to Periwound 5/28/2019  2:00 PM   Dressing Options Hydrofiber Silver;Nonadhesive Foam;Hypafix Tape;Pt's own compression sock 5/28/2019  2:00 PM   Dressing Cleansing/Solutions Not Applicable 5/28/2019  2:00 PM   Dressing Changed New 5/28/2019  2:00 PM   Dressing Status Clean;Dry;Intact 5/28/2019  2:00 PM   Dressing Change Frequency Every 72 hrs 5/28/2019  2:00 PM   WOUND NURSE ONLY - Odor None 5/28/2019  2:00 PM   WOUND NURSE ONLY - Exposed Structures None 5/28/2019  2:00 PM   WOUND NURSE ONLY - Tissue Type and Percentage 100% pink/red 5/28/2019  2:00 PM       Wound 05/28/19 Full Thickness Wound right 2nd toe, lateral (Active)   Wound Image           5/28/2019  2:00 PM   Site Assessment Red;Yellow 5/28/2019  2:00 PM   Ruthann-wound Assessment Edema;Intact 5/28/2019  2:00 PM   Margins Attached edges 5/28/2019  2:00 PM   Wound Length (cm) 1.3 cm 5/28/2019  2:00 PM   Wound Width (cm) 1.4 cm 5/28/2019  2:00 PM    Wound Depth (cm) 0.8 cm 5/28/2019  2:00 PM   Wound Surface Area (cm^2) 1.82 cm^2 5/28/2019  2:00 PM   Post Wound Length (cm) 1.3 cm 5/28/2019  2:00 PM    Post Wound Width (cm) 1.4 cm 5/28/2019  2:00 PM   Post Wound Depth (cm) 0.8 cm 5/28/2019  2:00 PM   Post Wound Surface Area (cm^2) 1.82 cm^2 5/28/2019  2:00 PM   Tunneling 0 cm 5/28/2019  2:00 PM   Undermining 0 cm 5/28/2019  2:00 PM   Closure Secondary intention 5/28/2019  2:00 PM   Drainage Amount Moderate 5/28/2019  2:00 PM   Drainage Description Serosanguineous 5/28/2019  2:00 PM   Non-staged Wound Description Full thickness 5/28/2019  2:00 PM   Treatments Cleansed;CSWD 5/28/2019  2:00 PM   Cleansing Approved Wound Cleanser 5/28/2019  2:00 PM   Periwound Protectant Skin Protectant wipes to Periwound 5/28/2019  2:00 PM   Dressing Options Hydrofiber Silver;Nonadhesive Foam;Hypafix Tape;Pt's own compression sock 5/28/2019  2:00 PM   Dressing Cleansing/Solutions Normal Saline 5/28/2019  2:00 PM   Dressing Changed New 5/28/2019  2:00 PM   Dressing Status Clean;Dry;Intact 5/28/2019  2:00 PM   Dressing Change Frequency Every 72 hrs 5/28/2019  2:00 PM   WOUND NURSE ONLY - Odor None 5/28/2019  2:00 PM   WOUND NURSE ONLY - Exposed Structures None 5/28/2019  2:00 PM   WOUND NURSE ONLY - Tissue Type and Percentage 70% red, 30% yellow 5/28/2019  2:00 PM     Procedures:  CSWD using curette to remove ~0.5cm2 nonviable material from right 2nd toe lateral wound site.  2% topical lidocaine applied prior to debridement.  Pt tolerated well, denied pain, neuropathy.    Plan: Schedule once weekly at wound clinic for now.  Wife Sihla to change dressing at home.  Add to LPS in 2 weeks.    PATIENT EDUCATION  - Importance of following up with pcp regarding new dx dm.  - Implications of loss of protective sensation (LOPS) discussed with patient- including increased risk for amputation.  - Advised to check feet at least daily, moisturize feet, and to always wear protective foot wear.    -  Importance of offloading foot to assist with wound healing  - Advised pt not to trim nails or calluses, seek foot/nail care from podiatrist or certified foot/nail nurse  - Importance of adequate nutrition for wound healing  - Increase protein intake (unless contraindicated by renal status)  - Directions handwritten and taught how to change dressing once weekly about half way between wound clinic appts and prn wet/soiled/falling off.  - S/sx infection to monitor for taught.

## 2019-06-03 ENCOUNTER — NON-PROVIDER VISIT (OUTPATIENT)
Dept: WOUND CARE | Facility: MEDICAL CENTER | Age: 84
End: 2019-06-03
Attending: INTERNAL MEDICINE
Payer: MEDICARE

## 2019-06-03 DIAGNOSIS — S91.109A OPEN WOUND OF TOE, INITIAL ENCOUNTER: ICD-10-CM

## 2019-06-03 DIAGNOSIS — B35.1 ONYCHOMYCOSIS: Primary | ICD-10-CM

## 2019-06-03 PROCEDURE — 97602 WOUND(S) CARE NON-SELECTIVE: CPT

## 2019-06-03 NOTE — PROCEDURES
Non selective with NS, gauze.   New wound to Lt 2nd dorsal toe, new order obtained.   Order obtained for foot and nail care

## 2019-06-03 NOTE — PATIENT INSTRUCTIONS
Should you experience any significant changes in your wound(s), such as infection (redness, swelling, localized heat, increased pain, fever > 101 F, chills) or have any questions regarding your home care instructions, please contact the wound center at [phone]. If after hours, contact your primary care physician or go to the hospital emergency room.   Keep your dressing clean, dry and cover while bathing. Only change dressing if it becomes over saturated, soiled or falls off.

## 2019-06-14 ENCOUNTER — HOSPITAL ENCOUNTER (OUTPATIENT)
Dept: RADIOLOGY | Facility: MEDICAL CENTER | Age: 84
End: 2019-06-14
Attending: NURSE PRACTITIONER
Payer: MEDICARE

## 2019-06-14 ENCOUNTER — OFFICE VISIT (OUTPATIENT)
Dept: WOUND CARE | Facility: MEDICAL CENTER | Age: 84
End: 2019-06-14
Attending: INTERNAL MEDICINE
Payer: MEDICARE

## 2019-06-14 DIAGNOSIS — L97.512 DIABETIC ULCER OF TOE OF RIGHT FOOT ASSOCIATED WITH TYPE 2 DIABETES MELLITUS, WITH FAT LAYER EXPOSED (HCC): ICD-10-CM

## 2019-06-14 DIAGNOSIS — E11.621 DIABETIC ULCER OF TOE OF RIGHT FOOT ASSOCIATED WITH TYPE 2 DIABETES MELLITUS, WITH FAT LAYER EXPOSED (HCC): ICD-10-CM

## 2019-06-14 PROCEDURE — 99213 OFFICE O/P EST LOW 20 MIN: CPT

## 2019-06-14 PROCEDURE — 99213 OFFICE O/P EST LOW 20 MIN: CPT | Performed by: NURSE PRACTITIONER

## 2019-06-14 PROCEDURE — 73630 X-RAY EXAM OF FOOT: CPT | Mod: RT

## 2019-06-14 NOTE — WOUND TEAM
Pt seen during ortho rounds with LPS team for Right 2nd toe wounds.  Measurements(cm): R 2nd toe medial and dorsum: Resolved; R 2nd toe lateral: 1x0.4x0.8. Following physician assessment, RN evaluated patient's wound and dressed with Aquacel AG covered with kerlix and loose coban per patient request.     Pt to return to [facility] weekly for wound care. Will obtain X-Ray after this appointment per provider orders.     Return to [facility] for follow up either 7/5 or 7/14.

## 2019-06-14 NOTE — PROGRESS NOTES
LIMB PRESERVATION SERVICE ROUNDS    Patient seen in collaboration with interdisciplinary team during LPS rounds in wound clinic for R 2nd toe amputation medial and lateral ulcers. Pt had 2nd toe amputation on 5/10/19 with Dr. Bullock. Amputation site has healed.    Pt does not check his blood sugars. He reports he has appt with diabetes educator in [place] on Monday.  Denies fevers, chills, nausea, vomiting.  He has completed abx      RESULTS:  No recent xray, no recent wound cx    7.5 % a1c 5/12/19    OBJECTIVE FINDINGS:     Wound: R 2nd toe lateral aspect with tract of 0.8cm. No erythema noted.     Thick calluses to left heel, 1st MTH, great toe      PROCEDURE   Wound care completed by  Emili Ortiz RN        PLAN:   Wound Care: Continue at [facility] weekly  Imaging: xray of R foot ordered  Offloading: Rx to [facility] for diabetic shoes and inserts. Pt advised to not wear sandals d/t loss of protective sensation  Antibiotics: none at this time  Surgery: none planned for this time  Referral: [facility] for diabetic shoes      LPS Follow up: 7/5/19      Please note that this dictation was created using voice recognition software. I have  worked with technical experts from [facility] to optimize the interface.  I have made every reasonable attempt to correct obvious errors, but there may be errors of grammar and possibly content that I did not discover before finalizing the note.

## 2019-06-21 ENCOUNTER — OFFICE VISIT (OUTPATIENT)
Dept: WOUND CARE | Facility: MEDICAL CENTER | Age: 84
End: 2019-06-21
Attending: INTERNAL MEDICINE
Payer: MEDICARE

## 2019-06-21 VITALS
TEMPERATURE: 96.8 F | DIASTOLIC BLOOD PRESSURE: 85 MMHG | SYSTOLIC BLOOD PRESSURE: 163 MMHG | HEART RATE: 57 BPM | OXYGEN SATURATION: 92 % | RESPIRATION RATE: 16 BRPM

## 2019-06-21 DIAGNOSIS — B35.1 ONYCHOMYCOSIS: ICD-10-CM

## 2019-06-21 DIAGNOSIS — L08.9 WOUND INFECTION: ICD-10-CM

## 2019-06-21 DIAGNOSIS — E11.621 DIABETIC ULCER OF TOE OF RIGHT FOOT ASSOCIATED WITH TYPE 2 DIABETES MELLITUS, WITH FAT LAYER EXPOSED (HCC): ICD-10-CM

## 2019-06-21 DIAGNOSIS — L97.512 DIABETIC ULCER OF TOE OF RIGHT FOOT ASSOCIATED WITH TYPE 2 DIABETES MELLITUS, WITH FAT LAYER EXPOSED (HCC): ICD-10-CM

## 2019-06-21 DIAGNOSIS — T14.8XXA WOUND INFECTION: ICD-10-CM

## 2019-06-21 DIAGNOSIS — E11.40 TYPE 2 DIABETES MELLITUS WITH DIABETIC NEUROPATHY, WITHOUT LONG-TERM CURRENT USE OF INSULIN (HCC): ICD-10-CM

## 2019-06-21 PROCEDURE — 11042 DBRDMT SUBQ TIS 1ST 20SQCM/<: CPT | Performed by: NURSE PRACTITIONER

## 2019-06-21 PROCEDURE — 11042 DBRDMT SUBQ TIS 1ST 20SQCM/<: CPT

## 2019-06-21 NOTE — PATIENT INSTRUCTIONS
Your healthcare provider applied Acell skin substitute today. Do not change the primary dressing for one week. Keep the outer dressings clean, dry and covered while bathing.You may change the outer dressing if soiled, saturated, or dislodged.     Should you experience any significant changes in your wound(s), such as infection (redness, swelling, localized heat, increased pain, fever > 101 F, chills) or have any questions regarding your home care instructions, please contact the wound center at [phone]. If after hours, contact your primary care physician or go to the hospital emergency room.

## 2019-06-24 ASSESSMENT — ENCOUNTER SYMPTOMS
PALPITATIONS: 0
VOMITING: 0
DEPRESSION: 0
CONSTIPATION: 0
FEVER: 0
FALLS: 0
SHORTNESS OF BREATH: 0
CHILLS: 0
COUGH: 0
NERVOUS/ANXIOUS: 0
NAUSEA: 0
DIARRHEA: 0
SENSORY CHANGE: 1
CLAUDICATION: 0

## 2019-06-25 NOTE — PROGRESS NOTES
Provider Encounter- Diabetic Foot Ulcer      HISTORY OF PRESENT ILLNESS                  START OF CARE IN CLINIC: 05/28/19               REFERRING PROVIDER: Ulisses Abreu MD ([facility] hospitalist)                 WOUND- Diabetic foot ulcer -              LOCATION: right 2nd toe, medial and lateral              WOUND HISTORY: Pt's right 2nd toenail fell off mid-April 2019.  The toe became infected.  Pt was sent by urgent care to ER and was admitted to [facility] 5/9/19.  On 5/10/19 he underwent right 2nd toe amputation with Dr. Bullock.  He was discharged home to [place] on 5/14/19.                   PERTINENT PMH: Newly diagnosed DM, hypothyroidism, hypertension, Factor V Leiden on chronic anticoag with hx DVT and PE.                   IMAGING: X-ray right foot on 6/14/2019              VASCULAR STUDIES:none in epic.                               LAST  WOUND CULTURE DATE : 5/10/19 bone cx negative.  Abx per ID (finished home Augmentin and Bactrim.  No antibiotics at this time.).                 OFFLOADING: None     Most recent A1c:  7.5 DATE 5/12/19       TOBACCO USE: none       DIABETES HX: Diagnosed with type 2 diabetes in May 2019, not on DM medications at this time.  Followed up with educator in [place].  Having difficulty checking blood sugars. Does have numbness in feet.  Was fitted for diabetic shoes following LPS rounds on 6/14/2019.      6/21: Initial provider visit.  Accompanied by wife. He followed up with LPS rounds on 6/14/2019 for nonhealing ulcer to medial and lateral left second toe amputation site.  Surgical and nonsurgical options discussed with patient.  Patient would like to try wound care first before revision of toe amputation site.  Excisional debridement of wound and first application of ACell applied today.  Return to LPS on 7/12/2019  Patient did not check his blood sugars this morning.  Had difficulty getting blood on to test strip.  Advised patient to follow-up with diabetes educator in  [place]      RESULTS:   Most recent A1c:  7.5 DATE 19     MOST RECENT IMAGIN2019.  X-ray right foot:  1.  Prior amputation of the second toe.    2.  No evidence of fracture or destructive change.    3.  Soft tissue swelling.    MOST RECENT VASCULAR STUDIES: None found in [facility].          PAST MEDICAL HISTORY:   Past Medical History:   Diagnosis Date   • Anesthesia     \" slow to recover\"   • Anticoagulation monitoring, special range    • Blood clotting disorder ([facility])     Factor 5 Leiden   • Cataract    • Hemorrhagic disorder ([facility])     xarelto   • High cholesterol    • History of DVT (deep vein thrombosis) 2014   • History of pulmonary embolism 2014   • History of sleep apnea 2014   • HTN (hypertension) 2014   • Hypercoagulable state (HCC) 2014   • Pulmonary disease    • Sleep apnea        PAST SURGICAL HISTORY:   Past Surgical History:   Procedure Laterality Date   • TOE AMPUTATION Right 5/10/2019    Procedure: AMPUTATION, TOE - 2nd toe;  Surgeon: Wood Bullock M.D.;  Location: SURGERY [facility];  Service: Orthopedics   • CATARACT PHACO WITH IOL Right 2017    Procedure: CATARACT PHACO WITH IOL;  Surgeon: Jero Hinson M.D.;  Location: SURGERY [facility];  Service:    • OTHER ORTHOPEDIC SURGERY Right 2016    total hip   • OTHER      left eye cataract   • OTHER      eye lid clip   • OTHER NEUROLOGICAL SURG      brain clot   • CERVICAL LAMINECTOMY POSTERIOR          MEDICATIONS:   Current Outpatient Prescriptions   Medication   • apixaban (ELIQUIS) 2.5mg Tab   • bumetanide (BUMEX) 1 MG Tab   • atorvastatin (LIPITOR) 10 MG Tab   • Multiple Vitamins-Minerals (OCUVITE-LUTEIN) Tab   • atenolol (TENORMIN) 25 MG TABS   • levothyroxine (SYNTHROID) 100 MCG TABS     No current facility-administered medications for this visit.        ALLERGIES:  No Known Allergies      SOCIAL HISTORY:   Social History     Social History   • Marital status:      " Spouse name: N/A   • Number of children: N/A   • Years of education: N/A     Social History Main Topics   • Smoking status: Never Smoker   • Smokeless tobacco: Never Used   • Alcohol use 3.0 oz/week     6 Standard drinks or equivalent per week      Comment: 1 oz   • Drug use: No   • Sexual activity: Not on file     Other Topics Concern   • Not on file     Social History Narrative   • No narrative on file       FAMILY HISTORY:   Family History   Problem Relation Age of Onset   • Other Mother    • Other Father    • Hypertension Father    • Heart Disease Neg Hx         REVIEW OF SYSTEMS:   Review of Systems   Constitutional: Negative for chills and fever.   Eyes:        Denies visual impairment   Respiratory: Negative for cough and shortness of breath.    Cardiovascular: Negative for chest pain, palpitations, claudication and leg swelling.   Gastrointestinal: Negative for constipation, diarrhea, nausea and vomiting.   Genitourinary: Negative for dysuria.   Musculoskeletal: Negative for falls and joint pain.   Skin: Negative.    Neurological: Positive for sensory change (numbness in feet).   Psychiatric/Behavioral: Negative for depression. The patient is not nervous/anxious.        PHYSICAL EXAMINATION:   BP (!) 163/85   Pulse (!) 57   Temp 36 °C (96.8 °F) (Temporal)   Resp 16   SpO2 92%     Physical Exam   Constitutional: He is oriented to person, place, and time and well-developed, well-nourished, and in no distress.   HENT:   Head: Normocephalic.   Right Ear: External ear normal.   Left Ear: External ear normal.   Eyes: Pupils are equal, round, and reactive to light.   Neck: Normal range of motion.   Cardiovascular: Normal rate and intact distal pulses.    Pulmonary/Chest: Effort normal.   Abdominal: Soft.   Musculoskeletal: He exhibits edema (RLE).   Overgrown, mycotic toenails   Neurological: He is alert and oriented to person, place, and time.   Insensate to feet   Skin: Skin is warm and dry.   Right second  webspace ulcer  See wound flowsheet for further detail   Psychiatric: Mood, memory, affect and judgment normal.   Nursing note and vitals reviewed.      WOUND ASSESSMENT- Refer to wound flowsheet    Pre-debridement photo  Right medial second toe-resolved.  No debridement      Right lateral second toe-pre-debridement          PROCEDURE: .  -2% viscous lidocaine applied topically to wound bed for approximately 5 minutes prior to debridement  -Curette used to debride wound bed and periwound callus.  Excisional debridement was performed to remove devitalized tissue until healthy, bleeding tissue was visualized.   Entire surface of wound, 0.42 cm2 debrided.  Tissue debrided into the subcutaneous layer.  -Bleeding controlled with manual pressure.      Using sterile technique reconstituted micro matrix with sterile NS.  Applied paste to wound bed.  No waste  -Wound care completed by Berna Obrien RN  See flow sheet for dressing orders.    -Patient tolerated the procedure well, without c/o pain or discomfort.        Micro matrix 30 mg  LOT: [id number]  REF: [id number]  SN:  [id number]  No waste      Post-debridement photo  Right lateral second toe                  PATIENT EDUCATION  - Importance of tight glucose control for wound healing   - Implications of loss of protective sensation (LOPS) discussed with patient- including increased risk for amputation.  - Advised to check feet at least daily, moisturize feet, and to always wear protective foot wear.   -  Importance of offloading foot to assist with wound healing  - Advised pt not to trim nails or calluses, seek foot/nail care from podiatrist or certified foot/nail nurse  - Importance of adequate nutrition for wound healing  - Increase protein intake (unless contraindicated by renal status)          ASSESSMENT AND PLAN:     1. Diabetic ulcer of toe of right foot associated with type 2 diabetes mellitus, with fat layer exposed (HCC)  Nonhealing ulcer to right second toe  amputation site lateral aspect.    6/21: Initial provider visit.  Excisional debridement with first application of ACell applied.  Both procedures medically necessary for wound healing.  Patient to return weekly for debridement, assessment, ACell application  If ulcer does not improve with ACell application.  Patient to undergo revision of second toe amputation site with Dr. Gonzalez.  Discussed case with Dr. Gonzalez today.  LPS rounds follow-up appointment scheduled for 7/12/2019  -Patient has taken impressions for inserts at [facility] last week, waiting for inserts and shoes to be made.  Advised to continue to wear close toed closed heeled shoes to protect feet due to loss of protective sensation.    Wound care: ACell, Adaptic, nonadhesive foam, Hypafix tape.      2. Type 2 diabetes mellitus with diabetic neuropathy, without long-term current use of insulin ([facility])  Newly diagnosed with DM.  A1c 7.5% on 5/12/2019 6/21: Followed up with diabetes educator in [place].  Patient could not check his blood sugars today.  He had difficulty getting sample of blood onto test strip.  Reports that yesterday's blood sugar was 175.  Discussed with patient the importance of keeping blood sugars below 150 for improved wound healing.  See education above for further information.    3. Wound infection  6/21: No signs and symptoms of infection today.  Patient has completed antibiotics    4. Onychomycosis  6/21: Dystrophic, thick toenails.  Patient also has thick calluses to left heel, first MTH, great toe.  Scheduled for foot nail care on 6/28/2019          Please note that this dictation was created using voice recognition software. I have worked with technical experts from [employer] to optimize the interface.  I have made every reasonable attempt to correct obvious errors, but there may be errors of grammar and possibly content that I did not discover before finalizing the note.

## 2019-06-28 ENCOUNTER — APPOINTMENT (OUTPATIENT)
Dept: WOUND CARE | Facility: MEDICAL CENTER | Age: 84
End: 2019-06-28
Attending: NURSE PRACTITIONER
Payer: MEDICARE

## 2019-06-28 ENCOUNTER — OFFICE VISIT (OUTPATIENT)
Dept: WOUND CARE | Facility: MEDICAL CENTER | Age: 84
End: 2019-06-28
Attending: INTERNAL MEDICINE
Payer: MEDICARE

## 2019-06-28 VITALS
TEMPERATURE: 97.7 F | SYSTOLIC BLOOD PRESSURE: 146 MMHG | HEART RATE: 59 BPM | RESPIRATION RATE: 16 BRPM | OXYGEN SATURATION: 93 % | DIASTOLIC BLOOD PRESSURE: 77 MMHG

## 2019-06-28 DIAGNOSIS — T14.8XXA WOUND INFECTION: ICD-10-CM

## 2019-06-28 DIAGNOSIS — L08.9 WOUND INFECTION: ICD-10-CM

## 2019-06-28 DIAGNOSIS — E11.621 DIABETIC ULCER OF TOE OF RIGHT FOOT ASSOCIATED WITH TYPE 2 DIABETES MELLITUS, WITH FAT LAYER EXPOSED (HCC): ICD-10-CM

## 2019-06-28 DIAGNOSIS — L97.512 DIABETIC ULCER OF TOE OF RIGHT FOOT ASSOCIATED WITH TYPE 2 DIABETES MELLITUS, WITH FAT LAYER EXPOSED (HCC): ICD-10-CM

## 2019-06-28 DIAGNOSIS — B35.1 ONYCHOMYCOSIS: ICD-10-CM

## 2019-06-28 DIAGNOSIS — E11.40 TYPE 2 DIABETES MELLITUS WITH DIABETIC NEUROPATHY, WITHOUT LONG-TERM CURRENT USE OF INSULIN (HCC): ICD-10-CM

## 2019-06-28 PROCEDURE — 99213 OFFICE O/P EST LOW 20 MIN: CPT | Performed by: NURSE PRACTITIONER

## 2019-06-28 PROCEDURE — 99213 OFFICE O/P EST LOW 20 MIN: CPT

## 2019-06-28 ASSESSMENT — ENCOUNTER SYMPTOMS
PALPITATIONS: 0
NERVOUS/ANXIOUS: 0
SENSORY CHANGE: 1
CHILLS: 0
DIARRHEA: 0
CLAUDICATION: 0
DEPRESSION: 0
FALLS: 0
COUGH: 0
CONSTIPATION: 0
VOMITING: 0
FEVER: 0
NAUSEA: 0
SHORTNESS OF BREATH: 0

## 2019-06-28 NOTE — PATIENT INSTRUCTIONS
Your healthcare provider applied Acell skin substitute today. Do not change the primary dressing for one week. Keep the outer dressings clean, dry and covered while bathing.You may change the outer dressing if soiled, saturated, or dislodged.     Should you experience any significant changes in your wound(s), such as infection (redness, swelling, localized heat, increased pain, fever > 101 F, chills) or have any questions regarding your home care instructions, please contact the wound center at [phone]. If after hours, contact your primary care physician or go to the hospital emergency room.

## 2019-06-29 NOTE — PROGRESS NOTES
Provider Encounter- Diabetic Foot Ulcer      HISTORY OF PRESENT ILLNESS                  START OF CARE IN CLINIC: 05/28/19               REFERRING PROVIDER: Ulisses Abreu MD ([facility] hospitalist)                 WOUND- Diabetic foot ulcer -              LOCATION: right 2nd toe, medial and lateral              WOUND HISTORY: Pt's right 2nd toenail fell off mid-April 2019.  The toe became infected.  Pt was sent by urgent care to ER and was admitted to [facility] 5/9/19.  On 5/10/19 he underwent right 2nd toe amputation with Dr. Bullock.  He was discharged home to [place] on 5/14/19.                   PERTINENT PMH: Newly diagnosed DM, hypothyroidism, hypertension, Factor V Leiden on chronic anticoag with hx DVT and PE.                   IMAGING: X-ray right foot on 6/14/2019              VASCULAR STUDIES:none in epic.                               LAST  WOUND CULTURE DATE : 5/10/19 bone cx negative.  Abx per ID (finished home Augmentin and Bactrim.  No antibiotics at this time.).                 OFFLOADING: None     Most recent A1c:  7.5 DATE 5/12/19       TOBACCO USE: none       DIABETES HX: Diagnosed with type 2 diabetes in May 2019, not on DM medications at this time.  Followed up with educator in [place].  Having difficulty checking blood sugars. Does have numbness in feet.  Was fitted for diabetic shoes following LPS rounds on 6/14/2019.      6/21: Initial provider visit.  Accompanied by wife. He followed up with LPS rounds on 6/14/2019 for nonhealing ulcer to medial and lateral left second toe amputation site.  Surgical and nonsurgical options discussed with patient.  Patient would like to try wound care first before revision of toe amputation site.  Excisional debridement of wound and first application of ACell applied today.  Return to LPS on 7/12/2019  Patient did not check his blood sugars this morning.  Had difficulty getting blood on to test strip.  Advised patient to follow-up with diabetes educator in  "[place]    : Patient returns with wife for assessment, debridement, possible application of ACell.  Wound size has decreased.  Residual product in place.  Second application of ACell applied.  Patient still has not followed up with diabetes educator in [place].  He reports that his shoes are currently being made.  Wearing sandals today.      RESULTS:   Most recent A1c:  7.5 DATE 19     MOST RECENT IMAGIN2019.  X-ray right foot:  1.  Prior amputation of the second toe.    2.  No evidence of fracture or destructive change.    3.  Soft tissue swelling.    MOST RECENT VASCULAR STUDIES: None found in epic.          PAST MEDICAL HISTORY:   Past Medical History:   Diagnosis Date   • Anesthesia     \" slow to recover\"   • Anticoagulation monitoring, special range    • Blood clotting disorder (HCC)     Factor 5 Leiden   • Cataract    • Hemorrhagic disorder (HCC)     xarelto   • High cholesterol    • History of DVT (deep vein thrombosis) 2014   • History of pulmonary embolism 2014   • History of sleep apnea 2014   • HTN (hypertension) 2014   • Hypercoagulable state (HCC) 2014   • Pulmonary disease    • Sleep apnea        PAST SURGICAL HISTORY:   Past Surgical History:   Procedure Laterality Date   • TOE AMPUTATION Right 5/10/2019    Procedure: AMPUTATION, TOE - 2nd toe;  Surgeon: Wood Bullock M.D.;  Location: SURGERY [facility];  Service: Orthopedics   • CATARACT PHACO WITH IOL Right 2017    Procedure: CATARACT PHACO WITH IOL;  Surgeon: Jero Hinson M.D.;  Location: SURGERY [facility];  Service:    • OTHER ORTHOPEDIC SURGERY Right 2016    total hip   • OTHER      left eye cataract   • OTHER      eye lid clip   • OTHER NEUROLOGICAL SURG      brain clot   • CERVICAL LAMINECTOMY POSTERIOR          MEDICATIONS:   Current Outpatient Prescriptions   Medication   • apixaban (ELIQUIS) 2.5mg Tab   • bumetanide (BUMEX) 1 MG Tab   • atorvastatin " (LIPITOR) 10 MG Tab   • Multiple Vitamins-Minerals (OCUVITE-LUTEIN) Tab   • atenolol (TENORMIN) 25 MG TABS   • levothyroxine (SYNTHROID) 100 MCG TABS     No current facility-administered medications for this visit.        ALLERGIES:  No Known Allergies      SOCIAL HISTORY:   Social History     Social History   • Marital status:      Spouse name: N/A   • Number of children: N/A   • Years of education: N/A     Social History Main Topics   • Smoking status: Never Smoker   • Smokeless tobacco: Never Used   • Alcohol use 3.0 oz/week     6 Standard drinks or equivalent per week      Comment: 1 oz   • Drug use: No   • Sexual activity: Not on file     Other Topics Concern   • Not on file     Social History Narrative   • No narrative on file       FAMILY HISTORY:   Family History   Problem Relation Age of Onset   • Other Mother    • Other Father    • Hypertension Father    • Heart Disease Neg Hx         REVIEW OF SYSTEMS:   Review of Systems   Constitutional: Negative for chills and fever.   Eyes:        Denies visual impairment   Respiratory: Negative for cough and shortness of breath.    Cardiovascular: Negative for chest pain, palpitations, claudication and leg swelling.   Gastrointestinal: Negative for constipation, diarrhea, nausea and vomiting.   Genitourinary: Negative for dysuria.   Musculoskeletal: Negative for falls and joint pain.   Skin: Negative.    Neurological: Positive for sensory change (numbness in feet).   Psychiatric/Behavioral: Negative for depression. The patient is not nervous/anxious.        PHYSICAL EXAMINATION:   /77   Pulse (!) 59   Temp 36.5 °C (97.7 °F) (Temporal)   Resp 16   SpO2 93%     Physical Exam   Constitutional: He is oriented to person, place, and time and well-developed, well-nourished, and in no distress.   HENT:   Head: Normocephalic.   Right Ear: External ear normal.   Left Ear: External ear normal.   Eyes: Pupils are equal, round, and reactive to light.   Neck:  Normal range of motion.   Cardiovascular: Normal rate and intact distal pulses.    Pulmonary/Chest: Effort normal.   Abdominal: Soft.   Musculoskeletal: He exhibits edema (RLE).   Overgrown, mycotic toenails   Neurological: He is alert and oriented to person, place, and time.   Insensate to feet   Skin: Skin is warm and dry.   Right second webspace ulcer  See wound flowsheet for further detail   Psychiatric: Mood, memory, affect and judgment normal.   Nursing note and vitals reviewed.      WOUND ASSESSMENT- Refer to wound flowsheet    photo        Right lateral second toe              PROCEDURE: .  -2% viscous lidocaine applied topically to wound bed for approximately 5 minutes  Total wound area 0.08 cm²    Using sterile technique reconstituted micro matrix 30 milligrams with sterile NS.  Applied paste to wound bed.  No waste  -Wound care completed by Berna Obrien RN  See flow sheet for dressing orders.    -Patient tolerated the procedure well, without c/o pain or discomfort.        Micro matrix 30 mg  LOT: [id number]  REF: MM [id number]  SN:  [id number]  No waste        PATIENT EDUCATION  - Importance of tight glucose control for wound healing   - Implications of loss of protective sensation (LOPS) discussed with patient- including increased risk for amputation.  - Advised to check feet at least daily, moisturize feet, and to always wear protective foot wear.   -  Importance of offloading foot to assist with wound healing  - Advised pt not to trim nails or calluses, seek foot/nail care from podiatrist or certified foot/nail nurse  - Importance of adequate nutrition for wound healing  - Increase protein intake (unless contraindicated by renal status)          ASSESSMENT AND PLAN:     1. Diabetic ulcer of toe of right foot associated with type 2 diabetes mellitus, with fat layer exposed (HCC)  Nonhealing ulcer to right second toe amputation site lateral aspect.    6/28: No excisional debridement performed today is residual  product remains in place.    Second application of ACell applied.   medically necessary for wound healing.  Patient to return weekly for debridement, assessment, ACell application  If ulcer does not improve with ACell application.  Patient to undergo revision of second toe amputation site with Dr. Gonzalez.    LPS rounds follow-up appointment scheduled for 7/19/2019  -Patient has taken impressions for inserts at [facility] last week, waiting for inserts and shoes to be made.  Patient wearing sandals today.  Advised to wear close toed closed heeled shoes to protect feet due to loss of protective sensation.    Wound care: ACell, Adaptic, nonadhesive foam, Hypafix tape, patient's own compression sock.      2. Type 2 diabetes mellitus with diabetic neuropathy, without long-term current use of insulin ([facility])  Newly diagnosed with DM.  A1c 7.5% on 5/12/2019 6/28: Patient has not returned to follow up with diabetes educator in [place].  Patient having difficulty getting sample of blood onto test strip.  Discussed with patient the importance of keeping blood sugars below 150 for improved wound healing.  See education above for further information.    3. Wound infection  6/28: No signs and symptoms of infection today.  Patient has completed antibiotics    4. Onychomycosis  6/28: Dystrophic, thick toenails.  Patient also has thick calluses to left heel, first MTH, great toe.  Scheduled for foot nail care on 6/28/2019          Please note that this dictation was created using voice recognition software. I have worked with technical experts from [facility] to optimize the interface.  I have made every reasonable attempt to correct obvious errors, but there may be errors of grammar and possibly content that I did not discover before finalizing the note.

## 2019-07-03 ENCOUNTER — OFFICE VISIT (OUTPATIENT)
Dept: WOUND CARE | Facility: MEDICAL CENTER | Age: 84
End: 2019-07-03
Attending: INTERNAL MEDICINE
Payer: MEDICARE

## 2019-07-03 VITALS
TEMPERATURE: 96.8 F | OXYGEN SATURATION: 92 % | SYSTOLIC BLOOD PRESSURE: 124 MMHG | RESPIRATION RATE: 16 BRPM | HEART RATE: 56 BPM | DIASTOLIC BLOOD PRESSURE: 70 MMHG

## 2019-07-03 DIAGNOSIS — E11.621 DIABETIC ULCER OF TOE OF RIGHT FOOT ASSOCIATED WITH TYPE 2 DIABETES MELLITUS, WITH FAT LAYER EXPOSED (HCC): ICD-10-CM

## 2019-07-03 DIAGNOSIS — L08.9 WOUND INFECTION: ICD-10-CM

## 2019-07-03 DIAGNOSIS — B35.1 ONYCHOMYCOSIS: ICD-10-CM

## 2019-07-03 DIAGNOSIS — T14.8XXA WOUND INFECTION: ICD-10-CM

## 2019-07-03 DIAGNOSIS — E11.40 TYPE 2 DIABETES MELLITUS WITH DIABETIC NEUROPATHY, WITHOUT LONG-TERM CURRENT USE OF INSULIN (HCC): ICD-10-CM

## 2019-07-03 DIAGNOSIS — L97.512 DIABETIC ULCER OF TOE OF RIGHT FOOT ASSOCIATED WITH TYPE 2 DIABETES MELLITUS, WITH FAT LAYER EXPOSED (HCC): ICD-10-CM

## 2019-07-03 PROCEDURE — 11042 DBRDMT SUBQ TIS 1ST 20SQCM/<: CPT

## 2019-07-03 PROCEDURE — 11042 DBRDMT SUBQ TIS 1ST 20SQCM/<: CPT | Performed by: NURSE PRACTITIONER

## 2019-07-03 ASSESSMENT — ENCOUNTER SYMPTOMS
CHILLS: 0
PALPITATIONS: 0
VOMITING: 0
NAUSEA: 0
SENSORY CHANGE: 1
DIARRHEA: 0
FEVER: 0
CONSTIPATION: 0
DEPRESSION: 0
COUGH: 0
CLAUDICATION: 0
SHORTNESS OF BREATH: 0
NERVOUS/ANXIOUS: 0
FALLS: 0

## 2019-07-03 NOTE — PROGRESS NOTES
Provider Encounter- Diabetic Foot Ulcer      HISTORY OF PRESENT ILLNESS                  START OF CARE IN CLINIC: 05/28/19               REFERRING PROVIDER: Ulisses Abreu MD ([facility] hospitalist)                 WOUND- Diabetic foot ulcer -              LOCATION: right 2nd toe, medial and lateral              WOUND HISTORY: Pt's right 2nd toenail fell off mid-April 2019.  The toe became infected.  Pt was sent by urgent care to ER and was admitted to [facility] 5/9/19.  On 5/10/19 he underwent right 2nd toe amputation with Dr. Bullock.  He was discharged home to [place] on 5/14/19.                   PERTINENT PMH: Newly diagnosed DM, hypothyroidism, hypertension, Factor V Leiden on chronic anticoag with hx DVT and PE.                   IMAGING: X-ray right foot on 6/14/2019              VASCULAR STUDIES:none in epic.                               LAST  WOUND CULTURE DATE : 5/10/19 bone cx negative.  Abx per ID (finished home Augmentin and Bactrim.  No antibiotics at this time.).                 OFFLOADING: None     Most recent A1c:  7.5 DATE 5/12/19       TOBACCO USE: none       DIABETES HX: Diagnosed with type 2 diabetes in May 2019, not on DM medications at this time.  Followed up with educator in [place].  Having difficulty checking blood sugars. Does have numbness in feet.  Was fitted for diabetic shoes following LPS rounds on 6/14/2019.      6/21: Initial provider visit.  Accompanied by wife. He followed up with LPS rounds on 6/14/2019 for nonhealing ulcer to medial and lateral left second toe amputation site.  Surgical and nonsurgical options discussed with patient.  Patient would like to try wound care first before revision of toe amputation site.  Excisional debridement of wound and first application of ACell applied today.  Return to LPS on 7/12/2019  Patient did not check his blood sugars this morning.  Had difficulty getting blood on to test strip.  Advised patient to follow-up with diabetes educator in  "[place]    : Patient returns with wife for assessment, debridement, possible application of ACell.  Wound size has decreased.  Residual product in place.  Second application of ACell applied.  Patient still has not followed up with diabetes educator in [place].  He reports that his shoes are currently being made.  Wearing sandals today.    7/3: Area of wound has decreased, however depth has not.  Excisional debridement of residual product and wound edges in clinic today.  Third application of ACell.  He is now being seen by the diabetes educator in [place] and is checking his blood sugars.  Reports his blood sugar this morning was 161.  He has gotten to the orthotist to begin process for his shoes and inserts.  He is wearing sandals again today.  He was unable to schedule appoint with for foot nurse in clinic due to scheduling conflicts.    RESULTS:   Most recent A1c:  7.5 DATE 19     MOST RECENT IMAGIN2019.  X-ray right foot:  1.  Prior amputation of the second toe.    2.  No evidence of fracture or destructive change.    3.  Soft tissue swelling.    MOST RECENT VASCULAR STUDIES: None found in epic.          PAST MEDICAL HISTORY:   Past Medical History:   Diagnosis Date   • Anesthesia     \" slow to recover\"   • Anticoagulation monitoring, special range    • Blood clotting disorder (HCC)     Factor 5 Leiden   • Cataract    • Hemorrhagic disorder (HCC)     xarelto   • High cholesterol    • History of DVT (deep vein thrombosis) 2014   • History of pulmonary embolism 2014   • History of sleep apnea 2014   • HTN (hypertension) 2014   • Hypercoagulable state (HCC) 2014   • Pulmonary disease    • Sleep apnea        PAST SURGICAL HISTORY:   Past Surgical History:   Procedure Laterality Date   • TOE AMPUTATION Right 5/10/2019    Procedure: AMPUTATION, TOE - 2nd toe;  Surgeon: Wood Bullock M.D.;  Location: SURGERY [facility];  Service: Orthopedics   • CATARACT PHACO " WITH IOL Right 1/17/2017    Procedure: CATARACT PHACO WITH IOL;  Surgeon: Jero Hinson M.D.;  Location: SUR[facility]ERY [facility];  Service:    • OTHER ORTHOPEDIC SURGERY Right 2016    total hip   • OTHER  2007    left eye cataract   • OTHER  2006    eye lid clip   • OTHER NEUROLOGICAL SURG  2000    brain clot   • CERVICAL LAMINECTOMY POSTERIOR          MEDICATIONS:   Current Outpatient Prescriptions   Medication   • apixaban (ELIQUIS) 2.5mg Tab   • bumetanide (BUMEX) 1 MG Tab   • atorvastatin (LIPITOR) 10 MG Tab   • Multiple Vitamins-Minerals (OCUVITE-LUTEIN) Tab   • atenolol (TENORMIN) 25 MG TABS   • levothyroxine (SYNTHROID) 100 MCG TABS     No current facility-administered medications for this visit.        ALLERGIES:  No Known Allergies      SOCIAL HISTORY:   Social History     Social History   • Marital status:      Spouse name: N/A   • Number of children: N/A   • Years of education: N/A     Social History Main Topics   • Smoking status: Never Smoker   • Smokeless tobacco: Never Used   • Alcohol use 3.0 oz/week     6 Standard drinks or equivalent per week      Comment: 1 oz   • Drug use: No   • Sexual activity: Not on file     Other Topics Concern   • Not on file     Social History Narrative   • No narrative on file       FAMILY HISTORY:   Family History   Problem Relation Age of Onset   • Other Mother    • Other Father    • Hypertension Father    • Heart Disease Neg Hx         REVIEW OF SYSTEMS:   Review of Systems   Constitutional: Negative for chills and fever.   Eyes:        Denies visual impairment   Respiratory: Negative for cough and shortness of breath.    Cardiovascular: Negative for chest pain, palpitations, claudication and leg swelling.   Gastrointestinal: Negative for constipation, diarrhea, nausea and vomiting.   Genitourinary: Negative for dysuria.   Musculoskeletal: Negative for falls and joint pain.   Skin: Negative.    Neurological: Positive for sensory change (numbness in feet).    Psychiatric/Behavioral: Negative for depression. The patient is not nervous/anxious.        PHYSICAL EXAMINATION:   /70   Pulse (!) 56   Temp 36 °C (96.8 °F) (Temporal)   Resp 16   SpO2 92%     Physical Exam   Constitutional: He is oriented to person, place, and time and well-developed, well-nourished, and in no distress.   HENT:   Head: Normocephalic.   Right Ear: External ear normal.   Left Ear: External ear normal.   Eyes: Pupils are equal, round, and reactive to light.   Neck: Normal range of motion.   Cardiovascular: Normal rate and intact distal pulses.    Pulmonary/Chest: Effort normal.   Abdominal: Soft.   Musculoskeletal: He exhibits edema (RLE).   Overgrown, mycotic toenails   Neurological: He is alert and oriented to person, place, and time.   Insensate to feet   Skin: Skin is warm and dry.   Right second webspace ulcer  See wound flowsheet for further detail   Psychiatric: Mood, memory, affect and judgment normal.   Nursing note and vitals reviewed.      WOUND ASSESSMENT     Wound 05/28/19 Full Thickness Wound right 2nd toe, lateral (Active)   Wound Image   7/3/2019  9:00 AM   Site Assessment Yellow 7/3/2019  9:00 AM   Ruthann-wound Assessment Intact 7/3/2019  9:00 AM   Margins Attached edges 7/3/2019  9:00 AM   Wound Length (cm) 0.4 cm 7/3/2019  9:00 AM   Wound Width (cm) 0.2 cm 7/3/2019  9:00 AM   Wound Depth (cm) 0.3 cm 7/3/2019  9:00 AM   Wound Surface Area (cm^2) 0.08 cm^2 7/3/2019  9:00 AM   Post Wound Length (cm) 0.4 cm 7/3/2019  9:00 AM    Post Wound Width (cm) 0.2 cm 7/3/2019  9:00 AM   Post Wound Depth (cm) 0.8 cm 7/3/2019  9:00 AM   Post Wound Surface Area (cm^2) 0.08 cm^2 7/3/2019  9:00 AM   Tunneling 0 cm 5/28/2019  2:00 PM   Undermining 0 cm 5/28/2019  2:00 PM   Closure Secondary intention 6/3/2019  8:30 AM   Drainage Amount Scant 7/3/2019  9:00 AM   Drainage Description Serosanguineous 7/3/2019  9:00 AM   Non-staged Wound Description Full thickness 7/3/2019  9:00 AM   Treatments  Cleansed;Pharmaceutical agent;Other (Comment) 7/3/2019  9:00 AM   Cleansing Normal Saline Irrigation 7/3/2019  9:00 AM   Periwound Protectant Skin Protectant wipes to Periwound 7/3/2019  9:00 AM   Dressing Options Biologic;Petroleum Gauze (clear);Steri-Strip;Nonadhesive Foam;Hypafix Tape;Other (Comments) 7/3/2019  9:00 AM   Dressing Cleansing/Solutions Normal Saline 6/21/2019  2:01 PM   Dressing Changed Changed 7/3/2019  9:00 AM   Dressing Status Clean;Dry;Intact 6/3/2019  8:30 AM   Dressing Change Frequency Every 72 hrs 6/3/2019  8:30 AM   WOUND NURSE ONLY - Odor None 7/3/2019  9:00 AM   WOUND NURSE ONLY - Pulses 1+;DP;PT;Not palpable 6/3/2019  8:30 AM   WOUND NURSE ONLY - Exposed Structures Other (Comments) 7/3/2019  9:00 AM   WOUND NURSE ONLY - Tissue Type and Percentage Pre-debridement: 100% yellow/leftover Acell. 7/3/2019  9:00 AM            Right lateral second toe, pre-debridement                  PROCEDURE: Excisional debridement of right lateral second toe ulcer, and application of ACell micro matrix powder   -2% viscous lidocaine applied topically to wound bed for approximately 5 minutes prior to debridement  -Curette used to debride wound bed and to open wound edges.  Excisional debridement was performed to remove devitalized tissue until healthy, bleeding tissue was visualized.   Entire surface of wound, 0.08 cm² debrided.  Tissue debrided into the subcutaneous layer.    -Bleeding controlled with manual pressure.     -Using sterile technique, 30 mg of ACell micro matrix powder hydrated with normal saline and applied to the wound bed.  No waste  -Wound care completed by Berna Obrien RN-refer to flowsheet  -Patient tolerated the procedure well, without c/o pain or discomfort.     Micro matrix 30 mg  LOT: [id number]  REF: MM [id number]  SN:  [id number]  No waste        PATIENT EDUCATION  - Importance of tight glucose control for wound healing   - Implications of loss of protective sensation (LOPS) discussed with  patient- including increased risk for amputation.  - Advised to check feet at least daily, moisturize feet, and to always wear protective foot wear.   -  Importance of offloading foot to assist with wound healing  - Advised pt not to trim nails or calluses, seek foot/nail care from podiatrist or certified foot/nail nurse  - Importance of adequate nutrition for wound healing  - Increase protein intake (unless contraindicated by renal status)          ASSESSMENT AND PLAN:     1. Diabetic ulcer of toe of right foot associated with type 2 diabetes mellitus, with fat layer exposed (HCC)  Nonhealing ulcer to right second toe amputation site lateral aspect.    7/3: Wound area has decreased, however depth has not.  -Excisional debridement of wound in clinic today, medically necessary to promote wound healing.  - Third application of ACell applied  -Patient to return weekly for debridement, assessment, ACell application  -He is to keep the dressing dry in between clinic visits, change outer dressing as needed saturation  -If ulcer does not improve with ACell application.  Patient to undergo revision of second toe amputation site with Dr. Gonzalez.  -LPS rounds follow-up appointment scheduled for 7/19/2019  -He has gone into orthotist to begin process for shoes and inserts.      Wound care: ACell to accelerate wound healing, Adaptic to maintain moisture product, nonadhesive foam cover dressing, Hypafix tape, patient's own compression sock to manage edema.      2. Type 2 diabetes mellitus with diabetic neuropathy, without long-term current use of insulin ([facility])  Newly diagnosed with DM.  A1c 7.5% on 5/12/2019    7/3: He has been receiving diabetes education from a nurse in [place], and is now checking his blood sugars  -He reports his blood sugar this morning was 161  -Adverse effects of hyperglycemia on wound healing discussed.  Patient encouraged to keep his blood sugars below 150    .    3. Wound infection  7/3: No signs  and symptoms of infection today.  Patient has completed antibiotics    4. Onychomycosis  7/3: Dystrophic, thick toenails.  Patient also has thick calluses to left heel, first MTH, great toe.  -  -Referral to foot nurse placed in clinic on 6/28.  However patient has not been able to coordinate appointment with his schedule  -Patient to try and schedule with foot nurse.  May need to consider podiatry referral.          Please note that this dictation was created using voice recognition software. I have worked with technical experts from [employer] [facility] to optimize the interface.  I have made every reasonable attempt to correct obvious errors, but there may be errors of grammar and possibly content that I did not discover before finalizing the note.

## 2019-07-03 NOTE — PATIENT INSTRUCTIONS
Your healthcare provider applied Acell skin substitute today. Do not change the primary dressing for one week. Keep the outer dressings clean, dry and covered while bathing.You may change the outer dressing if soiled, saturated, or dislodged.     Should you experience any significant changes in your wound(s), such as infection (redness, swelling, localized heat, increased pain, fever > 101 F, chills) or have any questions regarding your home care instructions, please contact the wound center at [phone]. If after hours, contact your primary care physician or go to the hospital emergency room.

## 2019-07-11 ENCOUNTER — OFFICE VISIT (OUTPATIENT)
Dept: WOUND CARE | Facility: MEDICAL CENTER | Age: 84
End: 2019-07-11
Attending: INTERNAL MEDICINE
Payer: MEDICARE

## 2019-07-11 VITALS
TEMPERATURE: 98.4 F | DIASTOLIC BLOOD PRESSURE: 82 MMHG | HEART RATE: 66 BPM | RESPIRATION RATE: 18 BRPM | OXYGEN SATURATION: 93 % | SYSTOLIC BLOOD PRESSURE: 154 MMHG

## 2019-07-11 DIAGNOSIS — T14.8XXA WOUND INFECTION: ICD-10-CM

## 2019-07-11 DIAGNOSIS — B35.1 ONYCHOMYCOSIS: ICD-10-CM

## 2019-07-11 DIAGNOSIS — E11.621 DIABETIC ULCER OF TOE OF RIGHT FOOT ASSOCIATED WITH TYPE 2 DIABETES MELLITUS, WITH FAT LAYER EXPOSED (HCC): ICD-10-CM

## 2019-07-11 DIAGNOSIS — L08.9 WOUND INFECTION: ICD-10-CM

## 2019-07-11 DIAGNOSIS — E11.40 TYPE 2 DIABETES MELLITUS WITH DIABETIC NEUROPATHY, WITHOUT LONG-TERM CURRENT USE OF INSULIN (HCC): ICD-10-CM

## 2019-07-11 DIAGNOSIS — L97.512 DIABETIC ULCER OF TOE OF RIGHT FOOT ASSOCIATED WITH TYPE 2 DIABETES MELLITUS, WITH FAT LAYER EXPOSED (HCC): ICD-10-CM

## 2019-07-11 PROCEDURE — 99213 OFFICE O/P EST LOW 20 MIN: CPT | Performed by: NURSE PRACTITIONER

## 2019-07-11 ASSESSMENT — ENCOUNTER SYMPTOMS
DEPRESSION: 0
CLAUDICATION: 0
SENSORY CHANGE: 1
SHORTNESS OF BREATH: 0
FALLS: 0
DIARRHEA: 0
PALPITATIONS: 0
NAUSEA: 0
COUGH: 0
FEVER: 0
CONSTIPATION: 0
VOMITING: 0
NERVOUS/ANXIOUS: 0
CHILLS: 0

## 2019-07-11 NOTE — PROGRESS NOTES
Provider Encounter- Diabetic Foot Ulcer      HISTORY OF PRESENT ILLNESS                  START OF CARE IN CLINIC: 05/28/19               REFERRING PROVIDER: Ulisses Abreu MD ([facility] hospitalist)                 WOUND- Diabetic foot ulcer -              LOCATION: right 2nd toe, medial and lateral              WOUND HISTORY: Pt's right 2nd toenail fell off mid-April 2019.  The toe became infected.  Pt was sent by urgent care to ER and was admitted to [facility] 5/9/19.  On 5/10/19 he underwent right 2nd toe amputation with Dr. Bullock.  He was discharged home to [place] on 5/14/19.                   PERTINENT PMH: Newly diagnosed DM, hypothyroidism, hypertension, Factor V Leiden on chronic anticoag with hx DVT and PE.                   IMAGING: X-ray right foot on 6/14/2019              VASCULAR STUDIES:none in epic.                               LAST  WOUND CULTURE DATE : 5/10/19 bone cx negative.  Abx per ID (finished home Augmentin and Bactrim.  No antibiotics at this time.).                 OFFLOADING: None     Most recent A1c:  7.5 DATE 5/12/19       TOBACCO USE: none       DIABETES HX: Diagnosed with type 2 diabetes in May 2019, not on DM medications at this time.  Followed up with educator in [place].  Having difficulty checking blood sugars. Does have numbness in feet.  Was fitted for diabetic shoes following LPS rounds on 6/14/2019.      6/21: Initial provider visit.  Accompanied by wife. He followed up with LPS rounds on 6/14/2019 for nonhealing ulcer to medial and lateral left second toe amputation site.  Surgical and nonsurgical options discussed with patient.  Patient would like to try wound care first before revision of toe amputation site.  Excisional debridement of wound and first application of ACell applied today.  Return to LPS on 7/12/2019  Patient did not check his blood sugars this morning.  Had difficulty getting blood on to test strip.  Advised patient to follow-up with diabetes educator in  [place]    : Patient returns with wife for assessment, debridement, possible application of ACell.  Wound size has decreased.  Residual product in place.  Second application of ACell applied.  Patient still has not followed up with diabetes educator in [place].  He reports that his shoes are currently being made.  Wearing sandals today.    7/3: Area of wound has decreased, however depth has not.  Excisional debridement of residual product and wound edges in clinic today.  Third application of ACell.  He is now being seen by the diabetes educator in [place] and is checking his blood sugars.  Reports his blood sugar this morning was 161.  He has gotten to the orthotist to begin process for his shoes and inserts.  He is wearing sandals again today.  He was unable to schedule appoint with for foot nurse in clinic due to scheduling conflicts.      : Clinic visit with Erika HANSEN.  Depth is slightly decreased.  Fourth application of ACell today.  His blood sugar today 132.  He is now getting his glucose monitor to work.  He contacted [facility], shoes and inserts will be ready by next week.  Continues to wear sandals.  Saw Dr. Albrecht today for foot and nail care.  Toenails trimmed and he was prescribed 2 different creams.  Does not recall the names of the creams.  One cream was for callus treatment and the other cream he believes is for athlete's foot.  Dr. Albrecht discussed hyperbaric therapy with patient.  He also discussed the fact that patient may benefit from a revision of the toe amputation site to heal the wound.  Patient is scheduled for LPS rounds on  to discuss options.  He is wondering when he can shower without the dressing in place.  Discussed with patient as long as he has acell in place he will need to cover the dressing while showering.    RESULTS:   Most recent A1c:  7.5 DATE 19     MOST RECENT IMAGIN2019.  X-ray right foot:  1.  Prior amputation of the second  "toe.    2.  No evidence of fracture or destructive change.    3.  Soft tissue swelling.    MOST RECENT VASCULAR STUDIES: None found in [facility].          PAST MEDICAL HISTORY:   Past Medical History:   Diagnosis Date   • Anesthesia     \" slow to recover\"   • Anticoagulation monitoring, special range    • Blood clotting disorder (HCC)     Factor 5 Leiden   • Cataract    • Hemorrhagic disorder (HCC)     xarelto   • High cholesterol    • History of DVT (deep vein thrombosis) 9/29/2014   • History of pulmonary embolism 9/29/2014   • History of sleep apnea 9/29/2014   • HTN (hypertension) 9/29/2014   • Hypercoagulable state (HCC) 9/29/2014   • Pulmonary disease    • Sleep apnea        PAST SURGICAL HISTORY:   Past Surgical History:   Procedure Laterality Date   • TOE AMPUTATION Right 5/10/2019    Procedure: AMPUTATION, TOE - 2nd toe;  Surgeon: Wood Bullock M.D.;  Location: SURGERY [facility];  Service: Orthopedics   • CATARACT PHACO WITH IOL Right 1/17/2017    Procedure: CATARACT PHACO WITH IOL;  Surgeon: Jero Hinson M.D.;  Location: SURGERY [facility];  Service:    • OTHER ORTHOPEDIC SURGERY Right 2016    total hip   • OTHER  2007    left eye cataract   • OTHER  2006    eye lid clip   • OTHER NEUROLOGICAL SURG  2000    brain clot   • CERVICAL LAMINECTOMY POSTERIOR          MEDICATIONS:   Current Outpatient Prescriptions   Medication   • apixaban (ELIQUIS) 2.5mg Tab   • bumetanide (BUMEX) 1 MG Tab   • atorvastatin (LIPITOR) 10 MG Tab   • Multiple Vitamins-Minerals (OCUVITE-LUTEIN) Tab   • atenolol (TENORMIN) 25 MG TABS   • levothyroxine (SYNTHROID) 100 MCG TABS     No current facility-administered medications for this visit.        ALLERGIES:  No Known Allergies      SOCIAL HISTORY:   Social History     Social History   • Marital status:      Spouse name: N/A   • Number of children: N/A   • Years of education: N/A     Social History Main Topics   • Smoking status: Never Smoker   • Smokeless " tobacco: Never Used   • Alcohol use 3.0 oz/week     6 Standard drinks or equivalent per week      Comment: 1 oz   • Drug use: No   • Sexual activity: Not on file     Other Topics Concern   • Not on file     Social History Narrative   • No narrative on file       FAMILY HISTORY:   Family History   Problem Relation Age of Onset   • Other Mother    • Other Father    • Hypertension Father    • Heart Disease Neg Hx         REVIEW OF SYSTEMS:   Review of Systems   Constitutional: Negative for chills and fever.   Eyes:        Denies visual impairment   Respiratory: Negative for cough and shortness of breath.    Cardiovascular: Negative for chest pain, palpitations, claudication and leg swelling.   Gastrointestinal: Negative for constipation, diarrhea, nausea and vomiting.   Genitourinary: Negative for dysuria.   Musculoskeletal: Negative for falls and joint pain.   Skin: Negative.    Neurological: Positive for sensory change (numbness in feet).   Psychiatric/Behavioral: Negative for depression. The patient is not nervous/anxious.        PHYSICAL EXAMINATION:   /82   Pulse 66   Temp 36.9 °C (98.4 °F) (Temporal)   Resp 18   SpO2 93%     Physical Exam   Constitutional: He is oriented to person, place, and time and well-developed, well-nourished, and in no distress.   HENT:   Head: Normocephalic.   Right Ear: External ear normal.   Left Ear: External ear normal.   Eyes: Pupils are equal, round, and reactive to light.   Neck: Normal range of motion.   Cardiovascular: Normal rate and intact distal pulses.    Pulmonary/Chest: Effort normal.   Abdominal: Soft.   Musculoskeletal: He exhibits edema (RLE).   Overgrown, mycotic toenails   Neurological: He is alert and oriented to person, place, and time.   Insensate to feet   Skin: Skin is warm and dry.   Right second webspace ulcer  See wound flowsheet for further detail   Psychiatric: Mood, memory, affect and judgment normal.   Nursing note and vitals reviewed.      WOUND  ASSESSMENT      Wound 05/28/19 Full Thickness Wound right 2nd toe, lateral (Active)   Wound Image   7/11/2019  3:06 PM   Site Assessment Yellow;White;Other (Comment) 7/11/2019  3:06 PM   Ruthann-wound Assessment Maceration 7/11/2019  3:06 PM   Margins Attached edges 7/11/2019  3:06 PM   Wound Length (cm) 0.3 cm 7/11/2019  3:06 PM   Wound Width (cm) 0.2 cm 7/11/2019  3:06 PM   Wound Depth (cm) 0.2 cm 7/11/2019  3:06 PM   Wound Surface Area (cm^2) 0.06 cm^2 7/11/2019  3:06 PM   Post Wound Length (cm) 0.4 cm 7/3/2019  9:00 AM    Post Wound Width (cm) 0.2 cm 7/3/2019  9:00 AM   Post Wound Depth (cm) 0.8 cm 7/3/2019  9:00 AM   Post Wound Surface Area (cm^2) 0.08 cm^2 7/3/2019  9:00 AM   Tunneling 0 cm 5/28/2019  2:00 PM   Undermining 0 cm 5/28/2019  2:00 PM   Closure Secondary intention 6/3/2019  8:30 AM   Drainage Amount Scant 7/11/2019  3:06 PM   Drainage Description Serosanguineous 7/11/2019  3:06 PM   Non-staged Wound Description Full thickness 7/11/2019  3:06 PM   Treatments Cleansed;Site care 7/11/2019  3:06 PM   Cleansing Normal Saline Irrigation 7/11/2019  3:06 PM   Periwound Protectant Skin Protectant wipes to Periwound 7/11/2019  3:06 PM   Dressing Options Biologic;Petroleum Gauze (clear);Steri-Strip;Nonadhesive Foam;Hypafix Tape 7/11/2019  3:06 PM   Dressing Cleansing/Solutions Normal Saline 6/21/2019  2:01 PM   Dressing Changed Changed 7/11/2019  3:06 PM   Dressing Status Clean;Dry;Intact 6/3/2019  8:30 AM   Dressing Change Frequency Every 72 hrs 6/3/2019  8:30 AM   WOUND NURSE ONLY - Odor None 7/11/2019  3:06 PM   WOUND NURSE ONLY - Pulses 1+;DP;PT;Not palpable 6/3/2019  8:30 AM   WOUND NURSE ONLY - Exposed Structures Other (Comments) 7/11/2019  3:06 PM   WOUND NURSE ONLY - Tissue Type and Percentage Pre debridement: 100% yellow/white residual Acell 7/11/2019  3:06 PM                 Right lateral second toe, no debridement          PROCEDURE:  application of ACell micro matrix powder  Total area of wound  0.06  cm²  -Using sterile technique,  60 mg of ACell micro matrix powder hydrated with normal saline and applied to the wound bed.    50% waste  -Wound care completed by  Emili Ortiz RN-refer to flowsheet  -Patient tolerated the procedure well, without c/o pain or discomfort.     Micro matrix 60 mg  LOT: [id number]  REF: [id number]  SN: [id number]  50% waste        PATIENT EDUCATION  - Importance of tight glucose control for wound healing   - Implications of loss of protective sensation (LOPS) discussed with patient- including increased risk for amputation.  - Advised to check feet at least daily, moisturize feet, and to always wear protective foot wear.   -  Importance of offloading foot to assist with wound healing  - Advised pt not to trim nails or calluses, seek foot/nail care from podiatrist or certified foot/nail nurse  - Importance of adequate nutrition for wound healing  - Increase protein intake (unless contraindicated by renal status)          ASSESSMENT AND PLAN:     1. Diabetic ulcer of toe of right foot associated with type 2 diabetes mellitus, with fat layer exposed (HCC)  Nonhealing ulcer to right second toe amputation site lateral aspect.    7/11: Wound area slightly decreased.  Depth has decreased by half but it is filled with residual product so true depth cannot be determined.  No excisional debridement performed today as there is some remaining product in place  -Fourth application of ACell micro matrix applied to fill wound.  30 mg of micro matrix not available in clinic today therefore 60 mg was used.  -Patient to return weekly for debridement, assessment, ACell application  -He is to keep the dressing dry in between clinic visits, change outer dressing as needed saturation.  Protect dressing during shower.  -If ulcer does not improve with ACell application.  Patient to undergo revision of second toe amputation site with Dr. Gonzalez.  -LPS rounds follow-up appointment scheduled for 7/19/2019 to  discuss options with Dr. Gonzalez.  -He has seen orthotist to begin process for shoes and inserts.  Shoes are to be ready by next week.  - He saw Dr. Trena FUENTES today and he recommended hyperbaric therapy.  Discussed with patient that his ulcer may not qualify for HBOT and sessions are 2-hour day, 5 days a week for total of 40 sessions.  Patient driving from [place] and cannot commit to this.  -DPM also discussed that patient may benefit from revision of toe amputation site to heel ulcer.    Wound care: ACell to accelerate wound healing, Adaptic to maintain moisture product, nonadhesive foam cover dressing, Hypafix tape, patient's own compression sock to manage edema.      2. Type 2 diabetes mellitus with diabetic neuropathy, without long-term current use of insulin ([facility])  Newly diagnosed with DM.  A1c 7.5% on 5/12/2019 7/11: He has been receiving diabetes education from a nurse in [place], and is now checking his blood sugars  -He reports his blood sugar this morning was 132  -Adverse effects of hyperglycemia on wound healing discussed.  Patient encouraged to keep his blood sugars below 150    .    3. Wound infection  7/3: No signs and symptoms of infection today.  Patient has completed antibiotics    4. Onychomycosis  7/11: Dystrophic, thick toenails.  Patient also has thick calluses to left heel, first MTH, great toe.  -  -Foot and nail was placed to foot and nail clinic at [facility] however he could not arrange with his schedule.  Saw podiatrist today and will continue to follow with podiatry for foot and nail care.  Podiatrist prescribed two creams, patient does not know the names of the creams.  But suspects 1 cream is for callus debridement and the other cream is for athlete's foot          Please note that this dictation was created using voice recognition software. I have worked with technical experts from [employer] to optimize the interface.  I have made every reasonable attempt to correct obvious  errors, but there may be errors of grammar and possibly content that I did not discover before finalizing the note.

## 2019-07-11 NOTE — PATIENT INSTRUCTIONS
Provider applied Acell skin substitute today. Do not change primary dressing for one week. You may change the outer dressing, if soiled, saturated, or dislodged, with your own bandage.     Should you experience any significant changes in your wound(s), such as infection (redness, swelling, localized heat, increased pain, fever > 101 F, chills) or have any questions regarding your home care instructions, please contact the wound center at [phone]. If after hours, contact your primary care physician or go to the hospital emergency room.     Keep dressing clean, dry and cover while bathing. Only change dressing if it's over saturated, soiled or falls off.

## 2019-07-19 ENCOUNTER — OFFICE VISIT (OUTPATIENT)
Dept: WOUND CARE | Facility: MEDICAL CENTER | Age: 84
End: 2019-07-19
Attending: INTERNAL MEDICINE
Payer: MEDICARE

## 2019-07-19 DIAGNOSIS — E11.621 DIABETIC ULCER OF TOE OF RIGHT FOOT ASSOCIATED WITH TYPE 2 DIABETES MELLITUS, WITH FAT LAYER EXPOSED (HCC): ICD-10-CM

## 2019-07-19 DIAGNOSIS — E11.40 TYPE 2 DIABETES MELLITUS WITH DIABETIC NEUROPATHY, WITHOUT LONG-TERM CURRENT USE OF INSULIN (HCC): ICD-10-CM

## 2019-07-19 DIAGNOSIS — L97.512 DIABETIC ULCER OF TOE OF RIGHT FOOT ASSOCIATED WITH TYPE 2 DIABETES MELLITUS, WITH FAT LAYER EXPOSED (HCC): ICD-10-CM

## 2019-07-19 PROCEDURE — 99213 OFFICE O/P EST LOW 20 MIN: CPT | Performed by: NURSE PRACTITIONER

## 2019-07-19 PROCEDURE — 99213 OFFICE O/P EST LOW 20 MIN: CPT

## 2019-07-19 NOTE — PROGRESS NOTES
LIMB PRESERVATION SERVICE ROUNDS    Patient seen in collaboration with interdisciplinary team during LPS rounds in wound clinic for R 2nd toe amputation medial and lateral ulcers. Pt had 2nd toe amputation on 5/10/19 with Dr. Bullock. Amputation site has healed but he developed two tracts medial and lateral of amputation site. The medial aspect has healed but the lateral tract has persisted.   He followed up with wound care weekly. Has had 4 applications of Acell.    Reports blood sugars have been averaging 132-138.   Denies fevers, chills, nausea, vomiting.  He has completed abx      RESULTS:  Xray: Prior amputation of the second toe.    2.  No evidence of fracture or destructive change.    3.  Soft tissue swelling.     no recent wound cx    7.5 % a1c 5/12/19    OBJECTIVE FINDINGS:     Wound: R 2nd toe lateral aspect with new epithelium, shallow pinpoint depression. No drainage. No erythema. No edema.           PROCEDURE   Dry gauze, hypafix tape applied.         PLAN:   Wound Care: discharged from wound care clinic. Instructed to protect site with gauze and tape. Change daily. Protect dressing while showering   Imaging: none  Offloading: shoes ready for . Pt to go to [facility] following this appt. Pt to gradually wean into shoes, check for calluses, hot spots frequently. If develops return to [facility] for adjustments.   If new ulcer develops pt instructed to contact PCP for new referral to wound clinic.   continue to work on keeping blood sugars below 150.  Antibiotics: none at this time  Surgery: none planned for this time  Referral: f/u with Dr. Bullock in 1 month  F/u with podiatry for foot and nail care routinely- already established with Dr. Albrecht     LPS Follow up: none      Please note that this dictation was created using voice recognition software. I have  worked with technical experts from [employer] to optimize the interface.  I have made every reasonable attempt to correct obvious errors, but  there may be errors of grammar and possibly content that I did not discover before finalizing the note.

## 2019-07-24 ENCOUNTER — APPOINTMENT (OUTPATIENT)
Dept: WOUND CARE | Facility: MEDICAL CENTER | Age: 84
End: 2019-07-24
Attending: INTERNAL MEDICINE
Payer: MEDICARE

## 2020-09-22 ENCOUNTER — APPOINTMENT (RX ONLY)
Dept: URBAN - METROPOLITAN AREA CLINIC 4 | Facility: CLINIC | Age: 85
Setting detail: DERMATOLOGY
End: 2020-09-22

## 2020-09-22 DIAGNOSIS — L57.0 ACTINIC KERATOSIS: ICD-10-CM

## 2020-09-22 DIAGNOSIS — D22 MELANOCYTIC NEVI: ICD-10-CM

## 2020-09-22 DIAGNOSIS — D18.0 HEMANGIOMA: ICD-10-CM

## 2020-09-22 DIAGNOSIS — L71.8 OTHER ROSACEA: ICD-10-CM

## 2020-09-22 DIAGNOSIS — Z85.828 PERSONAL HISTORY OF OTHER MALIGNANT NEOPLASM OF SKIN: ICD-10-CM

## 2020-09-22 DIAGNOSIS — L82.1 OTHER SEBORRHEIC KERATOSIS: ICD-10-CM

## 2020-09-22 DIAGNOSIS — Z71.89 OTHER SPECIFIED COUNSELING: ICD-10-CM

## 2020-09-22 DIAGNOSIS — D485 NEOPLASM OF UNCERTAIN BEHAVIOR OF SKIN: ICD-10-CM

## 2020-09-22 DIAGNOSIS — L81.4 OTHER MELANIN HYPERPIGMENTATION: ICD-10-CM

## 2020-09-22 PROBLEM — D48.5 NEOPLASM OF UNCERTAIN BEHAVIOR OF SKIN: Status: ACTIVE | Noted: 2020-09-22

## 2020-09-22 PROBLEM — D22.9 MELANOCYTIC NEVI, UNSPECIFIED: Status: ACTIVE | Noted: 2020-09-22

## 2020-09-22 PROBLEM — D18.01 HEMANGIOMA OF SKIN AND SUBCUTANEOUS TISSUE: Status: ACTIVE | Noted: 2020-09-22

## 2020-09-22 PROCEDURE — ? ADDITIONAL NOTES

## 2020-09-22 PROCEDURE — 11102 TANGNTL BX SKIN SINGLE LES: CPT

## 2020-09-22 PROCEDURE — 17003 DESTRUCT PREMALG LES 2-14: CPT

## 2020-09-22 PROCEDURE — ? BIOPSY BY SHAVE METHOD AND DESTRUCTION

## 2020-09-22 PROCEDURE — ? PRESCRIPTION

## 2020-09-22 PROCEDURE — ? COUNSELING

## 2020-09-22 PROCEDURE — ? LIQUID NITROGEN

## 2020-09-22 PROCEDURE — 11103 TANGNTL BX SKIN EA SEP/ADDL: CPT

## 2020-09-22 PROCEDURE — 17000 DESTRUCT PREMALG LESION: CPT | Mod: 59

## 2020-09-22 PROCEDURE — ? BIOPSY BY SHAVE METHOD

## 2020-09-22 PROCEDURE — 99203 OFFICE O/P NEW LOW 30 MIN: CPT | Mod: 25

## 2020-09-22 RX ORDER — METRONIDAZOLE 10 MG/G
1 GEL TOPICAL BID
Qty: 1 | Refills: 11 | Status: ERX | COMMUNITY
Start: 2020-09-22

## 2020-09-22 RX ADMIN — METRONIDAZOLE 1: 10 GEL TOPICAL at 00:00

## 2020-09-22 ASSESSMENT — LOCATION DETAILED DESCRIPTION DERM
LOCATION DETAILED: LEFT UPPER CUTANEOUS LIP
LOCATION DETAILED: NASAL DORSUM
LOCATION DETAILED: RIGHT SUPERIOR CENTRAL BUCCAL CHEEK
LOCATION DETAILED: RIGHT CENTRAL EYEBROW

## 2020-09-22 ASSESSMENT — LOCATION SIMPLE DESCRIPTION DERM
LOCATION SIMPLE: LEFT LIP
LOCATION SIMPLE: RIGHT CHEEK
LOCATION SIMPLE: RIGHT EYEBROW
LOCATION SIMPLE: NOSE

## 2020-09-22 ASSESSMENT — LOCATION ZONE DERM
LOCATION ZONE: NOSE
LOCATION ZONE: LIP
LOCATION ZONE: FACE

## 2020-09-22 NOTE — PROCEDURE: LIQUID NITROGEN
Post-Care Instructions: I reviewed with the patient in detail post-care instructions. Patient is to wear sunprotection, and avoid picking at any of the treated lesions. Pt may apply Vaseline to crusted or scabbing areas.
Detail Level: Simple
Number Of Freeze-Thaw Cycles: 2 freeze-thaw cycles
Render Note In Bullet Format When Appropriate: No
Duration Of Freeze Thaw-Cycle (Seconds): 2
Consent: The patient's consent was obtained including but not limited to risks of crusting, scabbing, blistering, scarring, darker or lighter pigmentary change, recurrence, incomplete removal and infection.

## 2020-09-22 NOTE — PROCEDURE: BIOPSY BY SHAVE METHOD AND DESTRUCTION
Detail Level: Detailed
Biopsy Type: H and E
Bill As?: Biopsy by Shave Method
Size Of Lesion In Cm (Optional): 0
Size Of Lesion After Curettage: 1
Anesthesia Type: 1% lidocaine with epinephrine
Anesthesia Volume In Cc: 2
Hemostasis: Aluminum Chloride and Electrocautery
Destruction Type: electrodesiccation
Number Of Curettages: 3
Wound Care: Vaseline
Lab: [id number]
Lab Facility: 
Render Path Notes In Note?: No
Consent: Written consent was obtained and risks were reviewed including but not limited to scarring, infection, bleeding, scabbing, incomplete removal, nerve damage and allergy to anesthesia.
Post-Care Instructions: I reviewed with the patient in detail post-care instructions. Patient is to keep the biopsy site dry overnight, and then apply bacitracin twice daily until healed. Patient may apply hydrogen peroxide soaks to remove any crusting.
Notification Instructions: Patient will be notified of biopsy results. However, patient instructed to call the office if not contacted within 2 weeks.
Billing Type: Third-Party Bill

## 2020-09-29 ENCOUNTER — APPOINTMENT (RX ONLY)
Dept: URBAN - METROPOLITAN AREA CLINIC 4 | Facility: CLINIC | Age: 85
Setting detail: DERMATOLOGY
End: 2020-09-29

## 2020-09-29 PROBLEM — D04.9 CARCINOMA IN SITU OF SKIN, UNSPECIFIED: Status: ACTIVE | Noted: 2020-09-29

## 2020-09-29 PROCEDURE — ? MOHS SURGERY PHONE CONSULTATION

## 2020-09-29 NOTE — PROCEDURE: MOHS SURGERY PHONE CONSULTATION
Which Antibiotic Do They Take For Surgical Prophylaxis?: Amoxicillin (2 grams)
Has The Patient Ever Received A Transplant?: No
Pathology Accession #: [id number]
Detail Level: Simple
Office Location Of Mohs Surgery: [facility]
Patient's Insurance: 
Does The Patient Take Antibiotics Prior To Dental Procedures?: Yes
Referring Provider: [email or web address]
Patient Preferred Phone Number: [phone]
Time Of Mohs Surgery: 08:50
If Yes- What Is The Patient's Pharmacy Number?: [phone]
If Yes- Additional Details: left hip 2018
If Yes- What Blood Thinners Do They Take?: eliquis 2.5mg bid
Date Of Mohs Surgery: 11/03/2020
Patient Reported Location: left upper cutaneous lip

## 2020-11-03 ENCOUNTER — APPOINTMENT (RX ONLY)
Dept: URBAN - METROPOLITAN AREA CLINIC 36 | Facility: CLINIC | Age: 85
Setting detail: DERMATOLOGY
End: 2020-11-03

## 2020-11-03 PROBLEM — C44.02 SQUAMOUS CELL CARCINOMA OF SKIN OF LIP: Status: ACTIVE | Noted: 2020-11-03

## 2020-11-03 PROCEDURE — ? MOHS SURGERY

## 2020-11-03 PROCEDURE — 13151 CMPLX RPR E/N/E/L 1.1-2.5 CM: CPT

## 2020-11-03 PROCEDURE — 17311 MOHS 1 STAGE H/N/HF/G: CPT

## 2020-11-03 NOTE — PROCEDURE: MOHS SURGERY
Mohs Case Number: 
Previous Accession (Optional): [id number]
Biopsy Photograph Reviewed: Yes
Referring Physician (Optional): Jin
Consent Type: Consent 1 (Standard)
Eye Shield Used: No
Surgeon Performing Repair (Optional): Stephen
Initial Size Of Lesion: 0.6
X Size Of Lesion In Cm (Optional): 0.4
Number Of Stages: 1
Primary Defect Length In Cm (Final Defect Size - Required For Flaps/Grafts): 0.7
Primary Defect Width In Cm (Final Defect Size - Required For Flaps/Grafts): 0.5
Repair Type: Complex Repair
Oculoplastic Surgeon (A): Gerry
Oculoplastic Surgeon Procedure Text (A): After obtaining clear surgical margins the patient was sent to oculoplastics for surgical repair.  The patient understands they will receive post-surgical care and follow-up from the referring physician's office.
Otolaryngologist Procedure Text (A): After obtaining clear surgical margins the patient was sent to otolaryngology for surgical repair.  The patient understands they will receive post-surgical care and follow-up from the referring physician's office.
Plastic Surgeon Procedure Text (A): After obtaining clear surgical margins the patient was sent to plastics for surgical repair.  The patient understands they will receive post-surgical care and follow-up from the referring physician's office.
Mid-Level Procedure Text (A): After obtaining clear surgical margins the patient was sent to a mid-level provider for surgical repair.  The patient understands they will receive post-surgical care and follow-up from the mid-level provider.
Provider Procedure Text (A): After obtaining clear surgical margins the defect was repaired by another provider.
Asc Procedure Text (A): After obtaining clear surgical margins the patient was sent to an ASC for surgical repair.  The patient understands they will receive post-surgical care and follow-up from the ASC physician.
Suturegard Retention Suture: 2-0 Nylon
Retention Suture Bite Size: 3 mm
Length To Time In Minutes Device Was In Place: 10
Simple / Intermediate / Complex Repair - Final Wound Length In Cm: 1.9
Distance Of Undermining In Cm (Required): 0.8
Undermining Type: Entire Wound
Debridement Text: The wound edges were debrided prior to proceeding with the closure to facilitate wound healing.
Complex Requirements: Involvement Of Free Margin?: Vermilion Border
Helical Rim Text: The closure involved the helical rim.
Vermilion Border Text: The closure involved the vermilion border.
Nostril Rim Text: The closure involved the nostril rim.
Retention Suture Text: Retention sutures were placed to support the closure and prevent dehiscence.
Secondary Defect Length In Cm (Required For Flaps): 0
Area H Indication Text: Tumors in this location are included in Area H (eyelids, eyebrows, nose, lips, chin, ear, pre-auricular, post-auricular, temple, genitalia, hands, feet, ankles and areola).  Tissue conservation is critical in these anatomic locations.
Area M Indication Text: Tumors in this location are included in Area M (cheek, forehead, scalp, neck, jawline and pretibial skin).  Mohs surgery is indicated for tumors in these anatomic locations.
Area L Indication Text: Tumors in this location are included in Area L (trunk and extremities).  Mohs surgery is indicated for larger tumors, or tumors with aggressive histologic features, in these anatomic locations.
Special Stains Stage 1 - Results: Base On Clearance Noted Above
Stage 2: Additional Anesthesia Type: 1% lidocaine with 1:100,000 epinephrine and 408mcg clindamycin/ml and a 1:10 solution of 8.4% sodium bicarbonate
Stage 4: Additional Anesthesia Type: 1% lidocaine with epinephrine
Include Tumor Staging In Mohs Note?: Please Select the Appropriate Response
Staging Info: By selecting yes to the question above you will include information on AJCC 8 tumor staging in your Mohs note. Information on tumor staging will be automatically added for SCCs on the head and neck. AJCC 8 includes tumor size, tumor depth, perineural involvement and bone invasion.
Tumor Depth: Less than 6mm from granular layer and no invasion beyond the subcutaneous fat
Medical Necessity Statement: Based on my medical judgement, Mohs surgery is the most appropriate treatment for this cancer compared to other treatments.
Alternatives Discussed Intro (Do Not Add Period): I discussed alternative treatments to Mohs surgery and specifically discussed the risks and benefits of
Consent 1/Introductory Paragraph: The rationale for Mohs was explained to the patient and consent was obtained. The risks, benefits and alternatives to therapy were discussed in detail. Specifically, the risks of infection, scarring, bleeding, prolonged wound healing, incomplete removal, allergy to anesthesia, nerve injury and recurrence were addressed. Prior to the procedure, the treatment site was clearly identified and confirmed by the patient. All components of Universal Protocol/PAUSE Rule completed.
Consent 2/Introductory Paragraph: Mohs surgery was explained to the patient and consent was obtained. The risks, benefits and alternatives to therapy were discussed in detail. Specifically, the risks of infection, scarring, bleeding, prolonged wound healing, incomplete removal, allergy to anesthesia, nerve injury and recurrence were addressed. Prior to the procedure, the treatment site was clearly identified and confirmed by the patient. All components of Universal Protocol/PAUSE Rule completed.
Consent 3/Introductory Paragraph: I gave the patient a chance to ask questions they had about the procedure.  Following this I explained the Mohs procedure and consent was obtained. The risks, benefits and alternatives to therapy were discussed in detail. Specifically, the risks of infection, scarring, bleeding, prolonged wound healing, incomplete removal, allergy to anesthesia, nerve injury and recurrence were addressed. Prior to the procedure, the treatment site was clearly identified and confirmed by the patient. All components of Universal Protocol/PAUSE Rule completed.
Consent (Temporal Branch)/Introductory Paragraph: The rationale for Mohs was explained to the patient and consent was obtained. The risks, benefits and alternatives to therapy were discussed in detail. Specifically, the risks of damage to the temporal branch of the facial nerve, infection, scarring, bleeding, prolonged wound healing, incomplete removal, allergy to anesthesia, and recurrence were addressed. Prior to the procedure, the treatment site was clearly identified and confirmed by the patient. All components of Universal Protocol/PAUSE Rule completed.
Consent (Marginal Mandibular)/Introductory Paragraph: The rationale for Mohs was explained to the patient and consent was obtained. The risks, benefits and alternatives to therapy were discussed in detail. Specifically, the risks of damage to the marginal mandibular branch of the facial nerve, infection, scarring, bleeding, prolonged wound healing, incomplete removal, allergy to anesthesia, and recurrence were addressed. Prior to the procedure, the treatment site was clearly identified and confirmed by the patient. All components of Universal Protocol/PAUSE Rule completed.
Consent (Spinal Accessory)/Introductory Paragraph: The rationale for Mohs was explained to the patient and consent was obtained. The risks, benefits and alternatives to therapy were discussed in detail. Specifically, the risks of damage to the spinal accessory nerve, infection, scarring, bleeding, prolonged wound healing, incomplete removal, allergy to anesthesia, and recurrence were addressed. Prior to the procedure, the treatment site was clearly identified and confirmed by the patient. All components of Universal Protocol/PAUSE Rule completed.
Consent (Near Eyelid Margin)/Introductory Paragraph: The rationale for Mohs was explained to the patient and consent was obtained. The risks, benefits and alternatives to therapy were discussed in detail. Specifically, the risks of ectropion or eyelid deformity, infection, scarring, bleeding, prolonged wound healing, incomplete removal, allergy to anesthesia, nerve injury and recurrence were addressed. Prior to the procedure, the treatment site was clearly identified and confirmed by the patient. All components of Universal Protocol/PAUSE Rule completed.
Consent (Ear)/Introductory Paragraph: The rationale for Mohs was explained to the patient and consent was obtained. The risks, benefits and alternatives to therapy were discussed in detail. Specifically, the risks of ear deformity, infection, scarring, bleeding, prolonged wound healing, incomplete removal, allergy to anesthesia, nerve injury and recurrence were addressed. Prior to the procedure, the treatment site was clearly identified and confirmed by the patient. All components of Universal Protocol/PAUSE Rule completed.
Consent (Nose)/Introductory Paragraph: The rationale for Mohs was explained to the patient and consent was obtained. The risks, benefits and alternatives to therapy were discussed in detail. Specifically, the risks of nasal deformity, changes in the flow of air through the nose, infection, scarring, bleeding, prolonged wound healing, incomplete removal, allergy to anesthesia, nerve injury and recurrence were addressed. Prior to the procedure, the treatment site was clearly identified and confirmed by the patient. All components of Universal Protocol/PAUSE Rule completed.
Consent (Lip)/Introductory Paragraph: The rationale for Mohs was explained to the patient and consent was obtained. The risks, benefits and alternatives to therapy were discussed in detail. Specifically, the risks of lip deformity, changes in the oral aperture, infection, scarring, bleeding, prolonged wound healing, incomplete removal, allergy to anesthesia, nerve injury and recurrence were addressed. Prior to the procedure, the treatment site was clearly identified and confirmed by the patient. All components of Universal Protocol/PAUSE Rule completed.
Consent (Scalp)/Introductory Paragraph: The rationale for Mohs was explained to the patient and consent was obtained. The risks, benefits and alternatives to therapy were discussed in detail. Specifically, the risks of changes in hair growth pattern secondary to repair, infection, scarring, bleeding, prolonged wound healing, incomplete removal, allergy to anesthesia, nerve injury and recurrence were addressed. Prior to the procedure, the treatment site was clearly identified and confirmed by the patient. All components of Universal Protocol/PAUSE Rule completed.
Detail Level: Detailed
Postop Diagnosis: same
Anesthesia Type: 0.5% lidocaine with 1:200,000 epinephrine and a 1:10 solution of 8.4% sodium bicarbonate and 408mcg clindamycin/ml
Anesthesia Volume In Cc: 3
Additional Anesthesia Volume In Cc: 6
Hemostasis: Electrocautery
Estimated Blood Loss (Cc): less than 5 cc
Repair Anesthesia Method: local infiltration
Deep Sutures: 5-0 Vicryl
Epidermal Sutures: 5-0 Ethilon
Epidermal Closure: running cuticular
Suturegard Intro: Intraoperative tissue expansion was performed, utilizing the SUTUREGARD device, in order to reduce wound tension.
Suturegard Body: The suture ends were repeatedly re-tightened and re-clamped to achieve the desired tissue expansion.
Hemigard Intro: Due to skin fragility and wound tension, it was decided to use HEMIGARD adhesive retention suture devices to permit a linear closure. The skin was cleaned and dried for a 6cm distance away from the wound. Excessive hair, if present, was removed to allow for adhesion.
Hemigard Postcare Instructions: The HEMIGARD strips are to remain completely dry for at least 5-7 days.
Donor Site Anesthesia Type: same as repair anesthesia
Graft Basting Suture (Optional): 5-0 Fast Absorbing Gut
Graft Donor Site Epidermal Sutures (Optional): 5-0 Ethibond
Epidermal Closure Graft Donor Site (Optional): simple interrupted
Graft Donor Site Bandage (Optional-Leave Blank If You Don't Want In Note): Aquaphor and telefa placed on wound. Pressure dressing applied to donor site
Closure 2 Information: This tab is for additional flaps and grafts, including complex repair and grafts and complex repair and flaps. You can also specify a different location for the additional defect, if the location is the same you do not need to select a new one. We will insert the automated text for the repair you select below just as we do for solitary flaps and grafts. Please note that at this time if you select a location with a different insurance zone you will need to override the ICD10 and CPT if appropriate.
Closure 3 Information: This tab is for additional flaps and grafts above and beyond our usual structured repairs.  Please note if you enter information here it will not currently bill and you will need to add the billing information manually.
Wound Care: Aquaphor
Dressing: dry sterile dressing
Wound Care (No Sutures): Petrolatum
Suture Removal: 7 days
Unna Boot Text: An Unna boot was placed to help immobilize the limb and facilitate more rapid healing.
Home Suture Removal Text: Patient was provided instructions on removing sutures and will remove their sutures at home.  If they have any questions or difficulties they will call the office.
Post-Care Instructions: I reviewed with the patient in detail post-care instructions. Patient is not to engage in any heavy lifting, exercise, or swimming for the next 14 days. Should the patient develop any fevers, chills, bleeding, severe pain patient will contact the office immediately.
Pain Refusal Text: I offered to prescribe pain medication but the patient refused to take this medication.
Mauc Instructions: By selecting yes to the question below the MAUC number will be added into the note.  This will be calculated automatically based on the diagnosis chosen, the size entered, the body zone selected (H,M,L) and the specific indications you chose. You will also have the option to override the Mohs AUC if you disagree with the automatically calculated number and this option is found in the Case Summary tab.
Where Do You Want The Question To Include Opioid Counseling Located?: Case Summary Tab
Eye Protection Verbiage: Before proceeding with the stage, a plastic scleral shield was inserted. The globe was anesthetized with a few drops of 1% lidocaine with 1:100,000 epinephrine. Then, an appropriate sized scleral shield was chosen and coated with lacrilube ointment. The shield was gently inserted and left in place for the duration of each stage. After the stage was completed, the shield was gently removed.
Mohs Method Verbiage: An incision at a 45 degree angle following the standard Mohs approach was done and the specimen was harvested as a microscopic controlled layer.
Surgeon/Pathologist Verbiage (Will Incorporate Name Of Surgeon From Intro If Not Blank): operated in two distinct and integrated capacities as the surgeon and pathologist.
Mohs Histo Method Verbiage: Each section was then chromacoded and processed in the Mohs lab using the Mohs protocol and submitted for frozen section.
Subsequent Stages Histo Method Verbiage: Using a similar technique to that described above, a thin layer of tissue was removed from all areas where tumor was visible on the previous stage.  The tissue was again oriented, mapped, dyed, and processed as above.
Mohs Rapid Report Verbiage: The area of clinically evident tumor was marked with skin marking ink and appropriately hatched.  The initial incision was made following the Mohs approach through the skin.  The specimen was taken to the lab, divided into the necessary number of pieces, chromacoded and processed according to the Mohs protocol.  This was repeated in successive stages until a tumor free defect was achieved.
Complex Repair Preamble Text (Leave Blank If You Do Not Want): Extensive wide undermining was performed at least 2 cm in all directions.
Intermediate Repair Preamble Text (Leave Blank If You Do Not Want): Undermining was performed with blunt dissection.
M-Plasty Complex Repair Preamble Text (Leave Blank If You Do Not Want): Extensive wide undermining was performed.
Non-Graft Cartilage Fenestration Text: The cartilage was fenestrated with a 2mm punch biopsy to help facilitate healing.
Graft Cartilage Fenestration Text: The cartilage was fenestrated with a 2mm punch biopsy to help facilitate graft survival and healing.
Secondary Intention Text (Leave Blank If You Do Not Want): The defect will heal with secondary intention.
No Repair - Repaired With Adjacent Surgical Defect Text (Leave Blank If You Do Not Want): After obtaining clear surgical margins the defect was repaired concurrently with another surgical defect which was in close approximation.
Advancement Flap (Single) Text: The defect edges were debeveled with a #15 scalpel blade.  Given the location of the defect and the proximity to free margins a single advancement flap was deemed most appropriate.  Using a sterile surgical marker, an appropriate advancement flap was drawn incorporating the defect and placing the expected incisions within the relaxed skin tension lines where possible.    The area thus outlined was incised deep to adipose tissue with a #15 scalpel blade.  The skin margins were undermined to an appropriate distance in all directions utilizing iris scissors.
Advancement Flap (Double) Text: The defect edges were debeveled with a #15 scalpel blade.  Given the location of the defect and the proximity to free margins a double advancement flap was deemed most appropriate.  Using a sterile surgical marker, the appropriate advancement flaps were drawn incorporating the defect and placing the expected incisions within the relaxed skin tension lines where possible.    The area thus outlined was incised deep to adipose tissue with a #15 scalpel blade.  The skin margins were undermined to an appropriate distance in all directions utilizing iris scissors.
Burow's Advancement Flap Text: The defect edges were debeveled with a #15 scalpel blade.  Given the location of the defect and the proximity to free margins a Burow's advancement flap was deemed most appropriate.  Using a sterile surgical marker, the appropriate advancement flap was drawn incorporating the defect and placing the expected incisions within the relaxed skin tension lines where possible.    The area thus outlined was incised deep to adipose tissue with a #15 scalpel blade.  The skin margins were undermined to an appropriate distance in all directions utilizing iris scissors.
Chonodrocutaneous Helical Advancement Flap Text: The defect edges were debeveled with a #15 scalpel blade.  Given the location of the defect and the proximity to free margins a chondrocutaneous helical advancement flap was deemed most appropriate.  Using a sterile surgical marker, the appropriate advancement flap was drawn incorporating the defect and placing the expected incisions within the relaxed skin tension lines where possible.    The area thus outlined was incised deep to adipose tissue with a #15 scalpel blade.  The skin margins were undermined to an appropriate distance in all directions utilizing iris scissors.
Crescentic Advancement Flap Text: The defect edges were debeveled with a #15 scalpel blade.  Given the location of the defect and the proximity to free margins a crescentic advancement flap was deemed most appropriate.  Using a sterile surgical marker, the appropriate advancement flap was drawn incorporating the defect and placing the expected incisions within the relaxed skin tension lines where possible.    The area thus outlined was incised deep to adipose tissue with a #15 scalpel blade.  The skin margins were undermined to an appropriate distance in all directions utilizing iris scissors.
A-T Advancement Flap Text: The defect edges were debeveled with a #15 scalpel blade.  Given the location of the defect, shape of the defect and the proximity to free margins an A-T advancement flap was deemed most appropriate.  Using a sterile surgical marker, an appropriate advancement flap was drawn incorporating the defect and placing the expected incisions within the relaxed skin tension lines where possible.    The area thus outlined was incised deep to adipose tissue with a #15 scalpel blade.  The skin margins were undermined to an appropriate distance in all directions utilizing iris scissors.
O-T Advancement Flap Text: The defect edges were debeveled with a #15 scalpel blade.  Given the location of the defect, shape of the defect and the proximity to free margins an O-T advancement flap was deemed most appropriate.  Using a sterile surgical marker, an appropriate advancement flap was drawn incorporating the defect and placing the expected incisions within the relaxed skin tension lines where possible.    The area thus outlined was incised deep to adipose tissue with a #15 scalpel blade.  The skin margins were undermined to an appropriate distance in all directions utilizing iris scissors.
O-L Flap Text: The defect edges were debeveled with a #15 scalpel blade.  Given the location of the defect, shape of the defect and the proximity to free margins an O-L flap was deemed most appropriate.  Using a sterile surgical marker, an appropriate advancement flap was drawn incorporating the defect and placing the expected incisions within the relaxed skin tension lines where possible.    The area thus outlined was incised deep to adipose tissue with a #15 scalpel blade.  The skin margins were undermined to an appropriate distance in all directions utilizing iris scissors.
O-Z Flap Text: The defect edges were debeveled with a #15 scalpel blade.  Given the location of the defect, shape of the defect and the proximity to free margins an O-Z flap was deemed most appropriate.  Using a sterile surgical marker, an appropriate transposition flap was drawn incorporating the defect and placing the expected incisions within the relaxed skin tension lines where possible. The area thus outlined was incised deep to adipose tissue with a #15 scalpel blade.  The skin margins were undermined to an appropriate distance in all directions utilizing iris scissors.
Double O-Z Flap Text: The defect edges were debeveled with a #15 scalpel blade.  Given the location of the defect, shape of the defect and the proximity to free margins a Double O-Z flap was deemed most appropriate.  Using a sterile surgical marker, an appropriate transposition flap was drawn incorporating the defect and placing the expected incisions within the relaxed skin tension lines where possible. The area thus outlined was incised deep to adipose tissue with a #15 scalpel blade.  The skin margins were undermined to an appropriate distance in all directions utilizing iris scissors.
V-Y Flap Text: The defect edges were debeveled with a #15 scalpel blade.  Given the location of the defect, shape of the defect and the proximity to free margins a V-Y flap was deemed most appropriate.  Using a sterile surgical marker, an appropriate advancement flap was drawn incorporating the defect and placing the expected incisions within the relaxed skin tension lines where possible.    The area thus outlined was incised deep to adipose tissue with a #15 scalpel blade.  The skin margins were undermined to an appropriate distance in all directions utilizing iris scissors.
Advancement-Rotation Flap Text: The defect edges were debeveled with a #15 scalpel blade.  Given the location of the defect, shape of the defect and the proximity to free margins an advancement-rotation flap was deemed most appropriate.  Using a sterile surgical marker, an appropriate flap was drawn incorporating the defect and placing the expected incisions within the relaxed skin tension lines where possible. The area thus outlined was incised deep to adipose tissue with a #15 scalpel blade.  The skin margins were undermined to an appropriate distance in all directions utilizing iris scissors.
Mercedes Flap Text: The defect edges were debeveled with a #15 scalpel blade.  Given the location of the defect, shape of the defect and the proximity to free margins a Mercedes flap was deemed most appropriate.  Using a sterile surgical marker, an appropriate advancement flap was drawn incorporating the defect and placing the expected incisions within the relaxed skin tension lines where possible. The area thus outlined was incised deep to adipose tissue with a #15 scalpel blade.  The skin margins were undermined to an appropriate distance in all directions utilizing iris scissors.
Modified Advancement Flap Text: The defect edges were debeveled with a #15 scalpel blade.  Given the location of the defect, shape of the defect and the proximity to free margins a modified advancement flap was deemed most appropriate.  Using a sterile surgical marker, an appropriate advancement flap was drawn incorporating the defect and placing the expected incisions within the relaxed skin tension lines where possible.    The area thus outlined was incised deep to adipose tissue with a #15 scalpel blade.  The skin margins were undermined to an appropriate distance in all directions utilizing iris scissors.
Mucosal Advancement Flap Text: Given the location of the defect, shape of the defect and the proximity to free margins a mucosal advancement flap was deemed most appropriate. Incisions were made with a 15 blade scalpel in the appropriate fashion along the cutaneous vermilion border and the mucosal lip. The remaining actinically damaged mucosal tissue was excised.  The mucosal advancement flap was then elevated to the gingival sulcus with care taken to preserve the neurovascular structures and advanced into the primary defect. Care was taken to ensure that precise realignment of the vermilion border was achieved.
Peng Advancement Flap Text: The defect edges were debeveled with a #15 scalpel blade.  Given the location of the defect, shape of the defect and the proximity to free margins a Peng advancement flap was deemed most appropriate.  Using a sterile surgical marker, an appropriate advancement flap was drawn incorporating the defect and placing the expected incisions within the relaxed skin tension lines where possible. The area thus outlined was incised deep to adipose tissue with a #15 scalpel blade.  The skin margins were undermined to an appropriate distance in all directions utilizing iris scissors.
Hatchet Flap Text: The defect edges were debeveled with a #15 scalpel blade.  Given the location of the defect, shape of the defect and the proximity to free margins a hatchet flap based from the glabella was deemed most appropriate.  Using a sterile surgical marker, an appropriate glabellar hatchet flap was drawn incorporating the defect and placing the expected incisions within the relaxed skin tension lines where possible.    The area thus outlined was incised deep to adipose tissue with a #15 scalpel blade.  The skin margins were undermined to an appropriate distance in all directions utilizing iris scissors.
Rotation Flap Text: The defect edges were debeveled with a #15 scalpel blade.  Given the location of the defect, shape of the defect and the proximity to free margins a rotation flap was deemed most appropriate.  Using a sterile surgical marker, an appropriate rotation flap was drawn incorporating the defect and placing the expected incisions within the relaxed skin tension lines where possible.    The area thus outlined was incised deep to adipose tissue with a #15 scalpel blade.  The skin margins were undermined to an appropriate distance in all directions utilizing iris scissors.
Spiral Flap Text: The defect edges were debeveled with a #15 scalpel blade.  Given the location of the defect, shape of the defect and the proximity to free margins a spiral flap was deemed most appropriate.  Using a sterile surgical marker, an appropriate rotation flap was drawn incorporating the defect and placing the expected incisions within the relaxed skin tension lines where possible. The area thus outlined was incised deep to adipose tissue with a #15 scalpel blade.  The skin margins were undermined to an appropriate distance in all directions utilizing iris scissors.
Star Wedge Flap Text: The defect edges were debeveled with a #15 scalpel blade.  Given the location of the defect, shape of the defect and the proximity to free margins a star wedge flap was deemed most appropriate.  Using a sterile surgical marker, an appropriate rotation flap was drawn incorporating the defect and placing the expected incisions within the relaxed skin tension lines where possible. The area thus outlined was incised deep to adipose tissue with a #15 scalpel blade.  The skin margins were undermined to an appropriate distance in all directions utilizing iris scissors.
Transposition Flap Text: The defect edges were debeveled with a #15 scalpel blade.  Given the location of the defect and the proximity to free margins a transposition flap was deemed most appropriate.  Using a sterile surgical marker, an appropriate transposition flap was drawn incorporating the defect.    The area thus outlined was incised deep to adipose tissue with a #15 scalpel blade.  The skin margins were undermined to an appropriate distance in all directions utilizing iris scissors.
Muscle Hinge Flap Text: The defect edges were debeveled with a #15 scalpel blade.  Given the size, depth and location of the defect and the proximity to free margins a muscle hinge flap was deemed most appropriate.  Using a sterile surgical marker, an appropriate hinge flap was drawn incorporating the defect. The area thus outlined was incised with a #15 scalpel blade.  The skin margins were undermined to an appropriate distance in all directions utilizing iris scissors.
Melolabial Transposition Flap Text: The defect edges were debeveled with a #15 scalpel blade.  Given the location of the defect and the proximity to free margins a melolabial flap was deemed most appropriate.  Using a sterile surgical marker, an appropriate melolabial transposition flap was drawn incorporating the defect.    The area thus outlined was incised deep to adipose tissue with a #15 scalpel blade.  The skin margins were undermined to an appropriate distance in all directions utilizing iris scissors.
Rhombic Flap Text: The defect edges were debeveled with a #15 scalpel blade.  Given the location of the defect and the proximity to free margins a rhombic flap was deemed most appropriate.  Using a sterile surgical marker, an appropriate rhombic flap was drawn incorporating the defect.    The area thus outlined was incised deep to adipose tissue with a #15 scalpel blade.  The skin margins were undermined to an appropriate distance in all directions utilizing iris scissors.
Rhomboid Transposition Flap Text: The defect edges were debeveled with a #15 scalpel blade.  Given the location of the defect and the proximity to free margins a rhomboid transposition flap was deemed most appropriate.  Using a sterile surgical marker, an appropriate rhomboid flap was drawn incorporating the defect.    The area thus outlined was incised deep to adipose tissue with a #15 scalpel blade.  The skin margins were undermined to an appropriate distance in all directions utilizing iris scissors.
Bi-Rhombic Flap Text: The defect edges were debeveled with a #15 scalpel blade.  Given the location of the defect and the proximity to free margins a bi-rhombic flap was deemed most appropriate.  Using a sterile surgical marker, an appropriate rhombic flap was drawn incorporating the defect. The area thus outlined was incised deep to adipose tissue with a #15 scalpel blade.  The skin margins were undermined to an appropriate distance in all directions utilizing iris scissors.
Helical Rim Advancement Flap Text: The defect edges were debeveled with a #15 blade scalpel.  Given the location of the defect and the proximity to free margins (helical rim) a double helical rim advancement flap was deemed most appropriate.  Using a sterile surgical marker, the appropriate advancement flaps were drawn incorporating the defect and placing the expected incisions between the helical rim and antihelix where possible.  The area thus outlined was incised through and through with a #15 scalpel blade.  With a skin hook and iris scissors, the flaps were gently and sharply undermined and freed up.
Bilateral Helical Rim Advancement Flap Text: The defect edges were debeveled with a #15 blade scalpel.  Given the location of the defect and the proximity to free margins (helical rim) a bilateral helical rim advancement flap was deemed most appropriate.  Using a sterile surgical marker, the appropriate advancement flaps were drawn incorporating the defect and placing the expected incisions between the helical rim and antihelix where possible.  The area thus outlined was incised through and through with a #15 scalpel blade.  With a skin hook and iris scissors, the flaps were gently and sharply undermined and freed up.
Ear Star Wedge Flap Text: The defect edges were debeveled with a #15 blade scalpel.  Given the location of the defect and the proximity to free margins (helical rim) an ear star wedge flap was deemed most appropriate.  Using a sterile surgical marker, the appropriate flap was drawn incorporating the defect and placing the expected incisions between the helical rim and antihelix where possible.  The area thus outlined was incised through and through with a #15 scalpel blade.
Banner Transposition Flap Text: The defect edges were debeveled with a #15 scalpel blade.  Given the location of the defect and the proximity to free margins a Banner transposition flap was deemed most appropriate.  Using a sterile surgical marker, an appropriate flap drawn around the defect. The area thus outlined was incised deep to adipose tissue with a #15 scalpel blade.  The skin margins were undermined to an appropriate distance in all directions utilizing iris scissors.
Bilobed Flap Text: The defect edges were debeveled with a #15 scalpel blade.  Given the location of the defect and the proximity to free margins a bilobe flap was deemed most appropriate.  Using a sterile surgical marker, an appropriate bilobe flap drawn around the defect.    The area thus outlined was incised deep to adipose tissue with a #15 scalpel blade.  The skin margins were undermined to an appropriate distance in all directions utilizing iris scissors.
Bilobed Transposition Flap Text: The defect edges were debeveled with a #15 scalpel blade.  Given the location of the defect and the proximity to free margins a bilobed transposition flap was deemed most appropriate.  Using a sterile surgical marker, an appropriate bilobe flap drawn around the defect.    The area thus outlined was incised deep to adipose tissue with a #15 scalpel blade.  The skin margins were undermined to an appropriate distance in all directions utilizing iris scissors.
Trilobed Flap Text: The defect edges were debeveled with a #15 scalpel blade.  Given the location of the defect and the proximity to free margins a trilobed flap was deemed most appropriate.  Using a sterile surgical marker, an appropriate trilobed flap drawn around the defect.    The area thus outlined was incised deep to adipose tissue with a #15 scalpel blade.  The skin margins were undermined to an appropriate distance in all directions utilizing iris scissors.
Dorsal Nasal Flap Text: The defect edges were debeveled with a #15 scalpel blade.  Given the location of the defect and the proximity to free margins a dorsal nasal flap,based upon the glabellar folds, was deemed most appropriate.  Using a sterile surgical marker, an appropriate dorsal nasal flap was drawn around the defect.    The area thus outlined was incised deep to adipose tissue with a #15 scalpel blade.  The skin margins were undermined to an appropriate distance in all directions utilizing iris scissors.
Island Pedicle Flap Text: The defect edges were debeveled with a #15 scalpel blade.  Given the location of the defect, shape of the defect and the proximity to free margins an island pedicle advancement flap was deemed most appropriate.  Using a sterile surgical marker, an appropriate advancement flap was drawn incorporating the defect, outlining the appropriate donor tissue and placing the expected incisions within the relaxed skin tension lines where possible.    The area thus outlined was incised deep to adipose tissue with a #15 scalpel blade.  The skin margins were undermined to an appropriate distance in all directions around the primary defect and laterally outward around the island pedicle utilizing iris scissors.  There was minimal undermining beneath the pedicle flap.
Island Pedicle Flap With Canthal Suspension Text: The defect edges were debeveled with a #15 scalpel blade.  Given the location of the defect, shape of the defect and the proximity to free margins an island pedicle advancement flap was deemed most appropriate.  Using a sterile surgical marker, an appropriate advancement flap was drawn incorporating the defect, outlining the appropriate donor tissue and placing the expected incisions within the relaxed skin tension lines where possible. The area thus outlined was incised deep to adipose tissue with a #15 scalpel blade.  The skin margins were undermined to an appropriate distance in all directions around the primary defect and laterally outward around the island pedicle utilizing iris scissors.  There was minimal undermining beneath the pedicle flap. A suspension suture was placed in the canthal tendon to prevent tension and prevent ectropion.
Alar Island Pedicle Flap Text: The defect edges were debeveled with a #15 scalpel blade.  Given the location of the defect, shape of the defect and the proximity to the alar rim an island pedicle advancement flap was deemed most appropriate.  Using a sterile surgical marker, an appropriate advancement flap was drawn incorporating the defect, outlining the appropriate donor tissue and placing the expected incisions within the nasal ala running parallel to the alar rim. The area thus outlined was incised with a #15 scalpel blade.  The skin margins were undermined minimally to an appropriate distance in all directions around the primary defect and laterally outward around the island pedicle utilizing iris scissors.  There was minimal undermining beneath the pedicle flap.
Double Island Pedicle Flap Text: The defect edges were debeveled with a #15 scalpel blade.  Given the location of the defect, shape of the defect and the proximity to free margins a double island pedicle advancement flap was deemed most appropriate.  Using a sterile surgical marker, an appropriate advancement flap was drawn incorporating the defect, outlining the appropriate donor tissue and placing the expected incisions within the relaxed skin tension lines where possible.    The area thus outlined was incised deep to adipose tissue with a #15 scalpel blade.  The skin margins were undermined to an appropriate distance in all directions around the primary defect and laterally outward around the island pedicle utilizing iris scissors.  There was minimal undermining beneath the pedicle flap.
Island Pedicle Flap-Requiring Vessel Identification Text: The defect edges were debeveled with a #15 scalpel blade.  Given the location of the defect, shape of the defect and the proximity to free margins an island pedicle advancement flap was deemed most appropriate.  Using a sterile surgical marker, an appropriate advancement flap was drawn, based on the axial vessel mentioned above, incorporating the defect, outlining the appropriate donor tissue and placing the expected incisions within the relaxed skin tension lines where possible.    The area thus outlined was incised deep to adipose tissue with a #15 scalpel blade.  The skin margins were undermined to an appropriate distance in all directions around the primary defect and laterally outward around the island pedicle utilizing iris scissors.  There was minimal undermining beneath the pedicle flap.
Keystone Flap Text: The defect edges were debeveled with a #15 scalpel blade.  Given the location of the defect, shape of the defect a keystone flap was deemed most appropriate.  Using a sterile surgical marker, an appropriate keystone flap was drawn incorporating the defect, outlining the appropriate donor tissue and placing the expected incisions within the relaxed skin tension lines where possible. The area thus outlined was incised deep to adipose tissue with a #15 scalpel blade.  The skin margins were undermined to an appropriate distance in all directions around the primary defect and laterally outward around the flap utilizing iris scissors.
O-T Plasty Text: The defect edges were debeveled with a #15 scalpel blade.  Given the location of the defect, shape of the defect and the proximity to free margins an O-T plasty was deemed most appropriate.  Using a sterile surgical marker, an appropriate O-T plasty was drawn incorporating the defect and placing the expected incisions within the relaxed skin tension lines where possible.    The area thus outlined was incised deep to adipose tissue with a #15 scalpel blade.  The skin margins were undermined to an appropriate distance in all directions utilizing iris scissors.
O-Z Plasty Text: The defect edges were debeveled with a #15 scalpel blade.  Given the location of the defect, shape of the defect and the proximity to free margins an O-Z plasty (double transposition flap) was deemed most appropriate.  Using a sterile surgical marker, the appropriate transposition flaps were drawn incorporating the defect and placing the expected incisions within the relaxed skin tension lines where possible.    The area thus outlined was incised deep to adipose tissue with a #15 scalpel blade.  The skin margins were undermined to an appropriate distance in all directions utilizing iris scissors.  Hemostasis was achieved with electrocautery.  The flaps were then transposed into place, one clockwise and the other counterclockwise, and anchored with interrupted buried subcutaneous sutures.
Double O-Z Plasty Text: The defect edges were debeveled with a #15 scalpel blade.  Given the location of the defect, shape of the defect and the proximity to free margins a Double O-Z plasty (double transposition flap) was deemed most appropriate.  Using a sterile surgical marker, the appropriate transposition flaps were drawn incorporating the defect and placing the expected incisions within the relaxed skin tension lines where possible. The area thus outlined was incised deep to adipose tissue with a #15 scalpel blade.  The skin margins were undermined to an appropriate distance in all directions utilizing iris scissors.  Hemostasis was achieved with electrocautery.  The flaps were then transposed into place, one clockwise and the other counterclockwise, and anchored with interrupted buried subcutaneous sutures.
V-Y Plasty Text: The defect edges were debeveled with a #15 scalpel blade.  Given the location of the defect, shape of the defect and the proximity to free margins an V-Y advancement flap was deemed most appropriate.  Using a sterile surgical marker, an appropriate advancement flap was drawn incorporating the defect and placing the expected incisions within the relaxed skin tension lines where possible.    The area thus outlined was incised deep to adipose tissue with a #15 scalpel blade.  The skin margins were undermined to an appropriate distance in all directions utilizing iris scissors.
H Plasty Text: Given the location of the defect, shape of the defect and the proximity to free margins a H-plasty was deemed most appropriate for repair.  Using a sterile surgical marker, the appropriate advancement arms of the H-plasty were drawn incorporating the defect and placing the expected incisions within the relaxed skin tension lines where possible. The area thus outlined was incised deep to adipose tissue with a #15 scalpel blade. The skin margins were undermined to an appropriate distance in all directions utilizing iris scissors.  The opposing advancement arms were then advanced into place in opposite direction and anchored with interrupted buried subcutaneous sutures.
W Plasty Text: The lesion was extirpated to the level of the fat with a #15 scalpel blade.  Given the location of the defect, shape of the defect and the proximity to free margins a W-plasty was deemed most appropriate for repair.  Using a sterile surgical marker, the appropriate transposition arms of the W-plasty were drawn incorporating the defect and placing the expected incisions within the relaxed skin tension lines where possible.    The area thus outlined was incised deep to adipose tissue with a #15 scalpel blade.  The skin margins were undermined to an appropriate distance in all directions utilizing iris scissors.  The opposing transposition arms were then transposed into place in opposite direction and anchored with interrupted buried subcutaneous sutures.
Z Plasty Text: The lesion was extirpated to the level of the fat with a #15 scalpel blade.  Given the location of the defect, shape of the defect and the proximity to free margins a Z-plasty was deemed most appropriate for repair.  Using a sterile surgical marker, the appropriate transposition arms of the Z-plasty were drawn incorporating the defect and placing the expected incisions within the relaxed skin tension lines where possible.    The area thus outlined was incised deep to adipose tissue with a #15 scalpel blade.  The skin margins were undermined to an appropriate distance in all directions utilizing iris scissors.  The opposing transposition arms were then transposed into place in opposite direction and anchored with interrupted buried subcutaneous sutures.
Zygomaticofacial Flap Text: Given the location of the defect, shape of the defect and the proximity to free margins a zygomaticofacial flap was deemed most appropriate for repair.  Using a sterile surgical marker, the appropriate flap was drawn incorporating the defect and placing the expected incisions within the relaxed skin tension lines where possible. The area thus outlined was incised deep to adipose tissue with a #15 scalpel blade with preservation of a vascular pedicle.  The skin margins were undermined to an appropriate distance in all directions utilizing iris scissors.  The flap was then placed into the defect and anchored with interrupted buried subcutaneous sutures.
Cheek Interpolation Flap Text: A decision was made to reconstruct the defect utilizing an interpolation axial flap and a staged reconstruction.  A telfa template was made of the defect.  This telfa template was then used to outline the Cheek Interpolation flap.  The donor area for the pedicle flap was then injected with anesthesia.  The flap was excised through the skin and subcutaneous tissue down to the layer of the underlying musculature.  The interpolation flap was carefully excised within this deep plane to maintain its blood supply.  The edges of the donor site were undermined.   The donor site was closed in a primary fashion.  The pedicle was then rotated into position and sutured.  Once the tube was sutured into place, adequate blood supply was confirmed with blanching and refill.  The pedicle was then wrapped with xeroform gauze and dressed appropriately with a telfa and gauze bandage to ensure continued blood supply and protect the attached pedicle.
Cheek-To-Nose Interpolation Flap Text: A decision was made to reconstruct the defect utilizing an interpolation axial flap and a staged reconstruction.  A telfa template was made of the defect.  This telfa template was then used to outline the Cheek-To-Nose Interpolation flap.  The donor area for the pedicle flap was then injected with anesthesia.  The flap was excised through the skin and subcutaneous tissue down to the layer of the underlying musculature.  The interpolation flap was carefully excised within this deep plane to maintain its blood supply.  The edges of the donor site were undermined.   The donor site was closed in a primary fashion.  The pedicle was then rotated into position and sutured.  Once the tube was sutured into place, adequate blood supply was confirmed with blanching and refill.  The pedicle was then wrapped with xeroform gauze and dressed appropriately with a telfa and gauze bandage to ensure continued blood supply and protect the attached pedicle.
Interpolation Flap Text: A decision was made to reconstruct the defect utilizing an interpolation axial flap and a staged reconstruction.  A telfa template was made of the defect.  This telfa template was then used to outline the interpolation flap.  The donor area for the pedicle flap was then injected with anesthesia.  The flap was excised through the skin and subcutaneous tissue down to the layer of the underlying musculature.  The interpolation flap was carefully excised within this deep plane to maintain its blood supply.  The edges of the donor site were undermined.   The donor site was closed in a primary fashion.  The pedicle was then rotated into position and sutured.  Once the tube was sutured into place, adequate blood supply was confirmed with blanching and refill.  The pedicle was then wrapped with xeroform gauze and dressed appropriately with a telfa and gauze bandage to ensure continued blood supply and protect the attached pedicle.
Melolabial Interpolation Flap Text: A decision was made to reconstruct the defect utilizing an interpolation axial flap and a staged reconstruction.  A telfa template was made of the defect.  This telfa template was then used to outline the melolabial interpolation flap.  The donor area for the pedicle flap was then injected with anesthesia.  The flap was excised through the skin and subcutaneous tissue down to the layer of the underlying musculature.  The pedicle flap was carefully excised within this deep plane to maintain its blood supply.  The edges of the donor site were undermined.   The donor site was closed in a primary fashion.  The pedicle was then rotated into position and sutured.  Once the tube was sutured into place, adequate blood supply was confirmed with blanching and refill.  The pedicle was then wrapped with xeroform gauze and dressed appropriately with a telfa and gauze bandage to ensure continued blood supply and protect the attached pedicle.
Mastoid Interpolation Flap Text: A decision was made to reconstruct the defect utilizing an interpolation axial flap and a staged reconstruction.  A telfa template was made of the defect.  This telfa template was then used to outline the mastoid interpolation flap.  The donor area for the pedicle flap was then injected with anesthesia.  The flap was excised through the skin and subcutaneous tissue down to the layer of the underlying musculature.  The pedicle flap was carefully excised within this deep plane to maintain its blood supply.  The edges of the donor site were undermined.   The donor site was closed in a primary fashion.  The pedicle was then rotated into position and sutured.  Once the tube was sutured into place, adequate blood supply was confirmed with blanching and refill.  The pedicle was then wrapped with xeroform gauze and dressed appropriately with a telfa and gauze bandage to ensure continued blood supply and protect the attached pedicle.
Posterior Auricular Interpolation Flap Text: A decision was made to reconstruct the defect utilizing an interpolation axial flap and a staged reconstruction.  A telfa template was made of the defect.  This telfa template was then used to outline the posterior auricular interpolation flap.  The donor area for the pedicle flap was then injected with anesthesia.  The flap was excised through the skin and subcutaneous tissue down to the layer of the underlying musculature.  The pedicle flap was carefully excised within this deep plane to maintain its blood supply.  The edges of the donor site were undermined.   The donor site was closed in a primary fashion.  The pedicle was then rotated into position and sutured.  Once the tube was sutured into place, adequate blood supply was confirmed with blanching and refill.  The pedicle was then wrapped with xeroform gauze and dressed appropriately with a telfa and gauze bandage to ensure continued blood supply and protect the attached pedicle.
Paramedian Forehead Flap Text: A decision was made to reconstruct the defect utilizing an interpolation axial flap and a staged reconstruction.  A telfa template was made of the defect.  This telfa template was then used to outline the paramedian forehead pedicle flap.  The donor area for the pedicle flap was then injected with anesthesia.  The flap was excised through the skin and subcutaneous tissue down to the layer of the underlying musculature.  The pedicle flap was carefully excised within this deep plane to maintain its blood supply.  The edges of the donor site were undermined.   The donor site was closed in a primary fashion.  The pedicle was then rotated into position and sutured.  Once the tube was sutured into place, adequate blood supply was confirmed with blanching and refill.  The pedicle was then wrapped with xeroform gauze and dressed appropriately with a telfa and gauze bandage to ensure continued blood supply and protect the attached pedicle.
Cheiloplasty (Less Than 50%) Text: A decision was made to reconstruct the defect with a  cheiloplasty.  The defect was undermined extensively.  Additional obicularis oris muscle was excised with a 15 blade scalpel.  The defect was converted into a full thickness wedge, of less than 50% of the vertical height of the lip, to facilite a better cosmetic result.  Small vessels were then tied off with 5-0 monocyrl. The obicularis oris, superficial fascia, adipose and dermis were then reapproximated.  After the deeper layers were approximated the epidermis was reapproximated with particular care given to realign the vermilion border.
Cheiloplasty (Complex) Text: A decision was made to reconstruct the defect with a  cheiloplasty.  The defect was undermined extensively.  Additional obicularis oris muscle was excised with a 15 blade scalpel.  The defect was converted into a full thickness wedge to facilite a better cosmetic result.  Small vessels were then tied off with 5-0 monocyrl. The obicularis oris, superficial fascia, adipose and dermis were then reapproximated.  After the deeper layers were approximated the epidermis was reapproximated with particular care given to realign the vermilion border.
Ear Wedge Repair Text: A wedge excision was completed by carrying down an excision through the full thickness of the ear and cartilage with an inward facing Burow's triangle. The wound was then closed in a layered fashion.
Full Thickness Lip Wedge Repair (Flap) Text: Given the location of the defect and the proximity to free margins a full thickness wedge repair was deemed most appropriate.  Using a sterile surgical marker, the appropriate repair was drawn incorporating the defect and placing the expected incisions perpendicular to the vermilion border.  The vermilion border was also meticulously outlined to ensure appropriate reapproximation during the repair.  The area thus outlined was incised through and through with a #15 scalpel blade.  The muscularis and dermis were reaproximated with deep sutures following hemostasis. Care was taken to realign the vermilion border before proceeding with the superficial closure.  Once the vermilion was realigned the superfical and mucosal closure was finished.
Ftsg Text: The defect edges were debeveled with a #15 scalpel blade.  Given the location of the defect, shape of the defect and the proximity to free margins a full thickness skin graft was deemed most appropriate.  Using a sterile surgical marker, the primary defect shape was transferred to the donor site. The area thus outlined was incised deep to adipose tissue with a #15 scalpel blade.  The harvested graft was then trimmed of adipose tissue until only dermis and epidermis was left.  The skin margins of the secondary defect were undermined to an appropriate distance in all directions utilizing iris scissors.  The secondary defect was closed with interrupted buried subcutaneous sutures.  The skin edges were then re-apposed with running  sutures.  The skin graft was then placed in the primary defect and oriented appropriately.
Split-Thickness Skin Graft Text: The defect edges were debeveled with a #15 scalpel blade.  Given the location of the defect, shape of the defect and the proximity to free margins a split thickness skin graft was deemed most appropriate.  Using a sterile surgical marker, the primary defect shape was transferred to the donor site. The split thickness graft was then harvested.  The skin graft was then placed in the primary defect and oriented appropriately.
Burow's Graft Text: The defect edges were debeveled with a #15 scalpel blade.  Given the location of the defect, shape of the defect, the proximity to free margins and the presence of a standing cone deformity a Burow's skin graft was deemed most appropriate. The standing cone was removed and this tissue was then trimmed to the shape of the primary defect. The adipose tissue was also removed until only dermis and epidermis were left.  The skin margins of the secondary defect were undermined to an appropriate distance in all directions utilizing iris scissors.  The secondary defect was closed with interrupted buried subcutaneous sutures.  The skin edges were then re-apposed with running  sutures.  The skin graft was then placed in the primary defect and oriented appropriately.
Cartilage Graft Text: The defect edges were debeveled with a #15 scalpel blade.  Given the location of the defect, shape of the defect, the fact the defect involved a full thickness cartilage defect a cartilage graft was deemed most appropriate.  An appropriate donor site was identified, cleansed, and anesthetized. The cartilage graft was then harvested and transferred to the recipient site, oriented appropriately and then sutured into place.  The secondary defect was then repaired using a primary closure.
Composite Graft Text: The defect edges were debeveled with a #15 scalpel blade.  Given the location of the defect, shape of the defect, the proximity to free margins and the fact the defect was full thickness a composite graft was deemed most appropriate.  The defect was outline and then transferred to the donor site.  A full thickness graft was then excised from the donor site. The graft was then placed in the primary defect, oriented appropriately and then sutured into place.  The secondary defect was then repaired using a primary closure.
Epidermal Autograft Text: The defect edges were debeveled with a #15 scalpel blade.  Given the location of the defect, shape of the defect and the proximity to free margins an epidermal autograft was deemed most appropriate.  Using a sterile surgical marker, the primary defect shape was transferred to the donor site. The epidermal graft was then harvested.  The skin graft was then placed in the primary defect and oriented appropriately.
Dermal Autograft Text: The defect edges were debeveled with a #15 scalpel blade.  Given the location of the defect, shape of the defect and the proximity to free margins a dermal autograft was deemed most appropriate.  Using a sterile surgical marker, the primary defect shape was transferred to the donor site. The area thus outlined was incised deep to adipose tissue with a #15 scalpel blade.  The harvested graft was then trimmed of adipose and epidermal tissue until only dermis was left.  The skin graft was then placed in the primary defect and oriented appropriately.
Skin Substitute Text: The defect edges were debeveled with a #15 scalpel blade.  Given the location of the defect, shape of the defect and the proximity to free margins a skin substitute graft was deemed most appropriate.  The graft material was trimmed to fit the size of the defect. The graft was then placed in the primary defect and oriented appropriately.
Tissue Cultured Epidermal Autograft Text: The defect edges were debeveled with a #15 scalpel blade.  Given the location of the defect, shape of the defect and the proximity to free margins a tissue cultured epidermal autograft was deemed most appropriate.  The graft was then trimmed to fit the size of the defect.  The graft was then placed in the primary defect and oriented appropriately.
Xenograft Text: The defect edges were debeveled with a #15 scalpel blade.  Given the location of the defect, shape of the defect and the proximity to free margins a xenograft was deemed most appropriate.  The graft was then trimmed to fit the size of the defect.  The graft was then placed in the primary defect and oriented appropriately.
Purse String (Simple) Text: Given the location of the defect and the characteristics of the surrounding skin a purse string closure was deemed most appropriate.  Undermining was performed circumfirentially around the surgical defect.  A purse string suture was then placed and tightened.
Purse String (Intermediate) Text: Given the location of the defect and the characteristics of the surrounding skin a purse string intermediate closure was deemed most appropriate.  Undermining was performed circumfirentially around the surgical defect.  A purse string suture was then placed and tightened.
Partial Purse String (Simple) Text: Given the location of the defect and the characteristics of the surrounding skin a simple purse string closure was deemed most appropriate.  Undermining was performed circumfirentially around the surgical defect.  A purse string suture was then placed and tightened. Wound tension only allowed a partial closure of the circular defect.
Partial Purse String (Intermediate) Text: Given the location of the defect and the characteristics of the surrounding skin an intermediate purse string closure was deemed most appropriate.  Undermining was performed circumfirentially around the surgical defect.  A purse string suture was then placed and tightened. Wound tension only allowed a partial closure of the circular defect.
Localized Dermabrasion With Wire Brush Text: The patient was draped in routine manner.  Localized dermabrasion using 3 x 17 mm wire brush was performed in routine manner to papillary dermis. This spot dermabrasion is being performed to complete skin cancer reconstruction. It also will eliminate the other sun damaged precancerous cells that are known to be part of the regional effect of a lifetime's worth of sun exposure. This localized dermabrasion is therapeutic and should not be considered cosmetic in any regard.
Tarsorrhaphy Text: A tarsorrhaphy was performed using Frost sutures.
Complex Repair And Flap Additional Text (Will Appearing After The Standard Complex Repair Text): The complex repair was not sufficient to completely close the primary defect. The remaining additional defect was repaired with the flap mentioned below.
Complex Repair And Graft Additional Text (Will Appearing After The Standard Complex Repair Text): The complex repair was not sufficient to completely close the primary defect. The remaining additional defect was repaired with the graft mentioned below.
Unique Flap 1 Name: Myocutaneous Island pedicle Flap
Unique Flap 2 Name: Peng Flap
Unique Flap 3 Name: Mercedes Flap
Unique Flap 4 Name: Banner Flap
Unique Flap 5 Name: tunneled myocutaneous flap
Unique Flap 1 Text: A decision was made to reconstruct the defect utilizing a myocutaneous Island pedicle Flap based on the levator labii superioris muscle.  A telfa template was made of the defect.  This telfa template was then used to outline the myocutaneous flap, based along the meilolabial fold.  The donor area for the pedicle flap was then injected with anesthesia.  The flap was excised through the skin and subcutaneous tissue down to the layer of the underlying musculature.  The myocutaneous flap was carefully excised within this deep plane to maintain its blood supply. Based on the muscle. The edges of the donor site were undermined.   The donor site was closed in a primary fashion to the point of transposition.  The pedicle was then transposed into position and sutured.  Once the flap was sutured into place, adequate blood supply was confirmed with blanching and refill.
Unique Flap 2 Text: A decision was made to reconstruct the defect utilizing a Peng Flap (Bilateral Advancement Rotation Flap). Given the location of the defect and the proximity to free margins, this flap was deemed most appropriate.  Using a sterile surgical marker, the appropriate rotation flaps were drawn incorporating the defect and placing the expected incisions within the relaxed skin tension lines where possible.    The area thus outlined was incised deep to adipose tissue with a #15 scalpel blade.  The skin margins were undermined to an appropriate distance in all directions utilizing iris scissors.
Unique Flap 3 Text: The defect edges were debeveled with a #15 scalpel blade.  Given the location of the defect, shape of the defect and the proximity to free margins a Mercedes (double advancement flap) was deemed most appropriate.  Using a sterile surgical marker, the appropriate transposition flaps were drawn incorporating the defect and placing the expected incisions within the relaxed skin tension lines where possible.    The area thus outlined was incised deep to adipose tissue with a #15 scalpel blade.  The skin margins were undermined to an appropriate distance in all directions utilizing iris scissors.  Hemostasis was achieved with electrocautery.  The flaps were then advanced into the defect and anchored with interrupted buried subcutaneous sutures.
Unique Flap 4 Text: The defect edges were debeveled with a #15 scalpel blade.  Given the location of the defect and the proximity to free margins a Banner transposition flap was deemed most appropriate.  Using a sterile surgical marker, an appropriate Banner transposition flap was drawn incorporating the defect.    The area thus outlined was incised deep to adipose tissue with a #15 scalpel blade.  The skin margins were undermined to an appropriate distance in all directions utilizing iris scissors.
Unique Flap 5 Text: A decision was made to reconstruct the defect utilizing a tunneled myocutaneous Island pedicle Flap based on the anterior auricularis muscle.  A telfa template was made of the defect.  This telfa template was then used to outline the myocutaneous flap, based along the preauricular fold.  The donor area for the pedicle flap was then injected with anesthesia.  The flap was excised through the skin and subcutaneous tissue down to the layer of the underlying musculature.  The myocutaneous flap was carefully excised within this deep plane to maintain its blood supply based on the muscle. The edges of the donor site were undermined.   The donor site was closed in a primary fashion to the point of transposition.  The pedicle was then transposed through a tunnel into position and sutured.  Once the flap was sutured into place, adequate blood supply was confirmed with blanching and refill.
Manual Repair Warning Statement: We plan on removing the manually selected variable below in favor of our much easier automatic structured text blocks found in the previous tab. We decided to do this to help make the flow better and give you the full power of structured data. Manual selection is never going to be ideal in our platform and I would encourage you to avoid using manual selection from this point on, especially since I will be sunsetting this feature. It is important that you do one of two things with the customized text below. First, you can save all of the text in a word file so you can have it for future reference. Second, transfer the text to the appropriate area in the Library tab. Lastly, if there is a flap or graft type which we do not have you need to let us know right away so I can add it in before the variable is hidden. No need to panic, we plan to give you roughly 6 months to make the change.
Same Histology In Subsequent Stages Text: The pattern and morphology of the tumor is as described in the first stage.
No Residual Tumor Seen Histology Text: There were no malignant cells seen in the sections examined.
Inflammation Suggestive Of Cancer Camouflage Histology Text: There was a dense lymphocytic infiltrate which prevented adequate histologic evaluation of adjacent structures.
Bcc Histology Text: There were numerous aggregates of basaloid cells.
Bcc Infiltrative Histology Text: There were numerous aggregates of basaloid cells demonstrating an infiltrative pattern.
Mart-1 - Positive Histology Text: MART-1 staining demonstrates areas of higher density and clustering of melanocytes with Pagetoid spread upwards within the epidermis. The surgical margins are positive for tumor cells.
Mart-1 - Negative Histology Text: MART-1 staining demonstrates a normal density and pattern of melanocytes along the dermal-epidermal junction. The surgical margins are negative for tumor cells.
Information: Selecting Yes will display possible errors in your note based on the variables you have selected. This validation is only offered as a suggestion for you. PLEASE NOTE THAT THE VALIDATION TEXT WILL BE REMOVED WHEN YOU FINALIZE YOUR NOTE. IF YOU WANT TO FAX A PRELIMINARY NOTE YOU WILL NEED TO TOGGLE THIS TO 'NO' IF YOU DO NOT WANT IT IN YOUR FAXED NOTE.

## 2020-11-10 ENCOUNTER — APPOINTMENT (RX ONLY)
Dept: URBAN - METROPOLITAN AREA CLINIC 4 | Facility: CLINIC | Age: 85
Setting detail: DERMATOLOGY
End: 2020-11-10

## 2020-11-10 PROBLEM — C44.41 BASAL CELL CARCINOMA OF SKIN OF SCALP AND NECK: Status: ACTIVE | Noted: 2020-11-10

## 2020-11-10 PROCEDURE — 11622 EXC S/N/H/F/G MAL+MRG 1.1-2: CPT | Mod: 79

## 2020-11-10 PROCEDURE — ? EXCISION

## 2020-11-10 PROCEDURE — 12042 INTMD RPR N-HF/GENIT2.6-7.5: CPT | Mod: 79

## 2020-11-10 NOTE — PROCEDURE: EXCISION
Surgeon Performing The Repair (Optional): Get
Biopsy Photograph Reviewed: Yes
Previous Accession (Optional): [id number].
Size Of Lesion In Cm: 0.5
X Size Of Lesion In Cm (Optional): 0.7
Size Of Margin In Cm: 0.3
Excision Method: Fusiform
Anesthesia Volume In Cc: 6
Did You Provide Opioid Counseling: No
Repair Type: Intermediate
Suturegard Retention Suture: 2-0 Nylon
Retention Suture Bite Size: 3 mm
Length To Time In Minutes Device Was In Place: 10
Number Of Hemigard Strips Per Side: 1
Intermediate / Complex Repair - Final Wound Length In Cm: 3.1
Undermining Type: Entire Wound
Debridement Text: The wound edges were debrided prior to proceeding with the closure to facilitate wound healing.
Helical Rim Text: The closure involved the helical rim.
Vermilion Border Text: The closure involved the vermilion border.
Nostril Rim Text: The closure involved the nostril rim.
Retention Suture Text: Retention sutures were placed to support the closure and prevent dehiscence.
Primary Defect Length (In Cm): 0
Suture Removal: 14 days
Lab: [id number]
Lab Facility: 
Graft Donor Site Bandage (Optional-Leave Blank If You Don't Want In Note): Steri-strips and a pressure bandage were applied to the donor site.
Epidermal Closure Graft Donor Site (Optional): simple interrupted
Billing Type: Third-Party Bill
Excision Depth: adipose tissue
Scalpel Size: 15 blade
Anesthesia Type: 1% lidocaine with 1:100,000 epinephrine and 408mcg clindamycin/ml and a 1:10 solution of 8.4% sodium bicarbonate
Hemostasis: Electrocautery
Estimated Blood Loss (Cc): minimal
Detail Level: Detailed
Anesthesia Type: 1% lidocaine with 1:100,000 epinephrine and a 1:10 solution of 8.4% sodium bicarbonate
Deep Sutures: 5-0 PGA
Epidermal Sutures: 4-0 Nylon
Epidermal Closure: running cuticular
Wound Care: Vaseline
Dressing: pressure dressing
Suturegard Intro: Intraoperative tissue expansion was performed, utilizing the SUTUREGARD device, in order to reduce wound tension.
Suturegard Body: The suture ends were repeatedly re-tightened and re-clamped to achieve the desired tissue expansion.
Hemigard Intro: Due to skin fragility and wound tension, it was decided to use HEMIGARD adhesive retention suture devices to permit a linear closure. The skin was cleaned and dried for a 6cm distance away from the wound. Excessive hair, if present, was removed to allow for adhesion.
Hemigard Postcare Instructions: The HEMIGARD strips are to remain completely dry for at least 5-7 days.
Positioning (Leave Blank If You Do Not Want): The patient was placed in a comfortable position exposing the surgical site.
Pre-Excision Curettage Text (Leave Blank If You Do Not Want): Prior to drawing the surgical margin the visible lesion was removed with electrodesiccation and curettage to clearly define the lesion size.
Complex Repair Preamble Text (Leave Blank If You Do Not Want): Extensive wide undermining was performed.
Intermediate Repair Preamble Text (Leave Blank If You Do Not Want): Undermining was performed with blunt dissection.
Curvilinear Excision Additional Text (Leave Blank If You Do Not Want): The margin was drawn around the clinically apparent lesion.  A curvilinear shape was then drawn on the skin incorporating the lesion and margins.  Incisions were then made along these lines to the appropriate tissue plane and the lesion was extirpated.
Fusiform Excision Additional Text (Leave Blank If You Do Not Want): The margin was drawn around the clinically apparent lesion.  A fusiform shape was then drawn on the skin incorporating the lesion and margins.  Incisions were then made along these lines to the appropriate tissue plane and the lesion was extirpated.
Elliptical Excision Additional Text (Leave Blank If You Do Not Want): The margin was drawn around the clinically apparent lesion.  An elliptical shape was then drawn on the skin incorporating the lesion and margins.  Incisions were then made along these lines to the appropriate tissue plane and the lesion was extirpated.
Saucerization Excision Additional Text (Leave Blank If You Do Not Want): The margin was drawn around the clinically apparent lesion.  Incisions were then made along these lines, in a tangential fashion, to the appropriate tissue plane and the lesion was extirpated.
Slit Excision Additional Text (Leave Blank If You Do Not Want): A linear line was drawn on the skin overlying the lesion. An incision was made slowly until the lesion was visualized.  Once visualized, the lesion was removed with blunt dissection.
Excisional Biopsy Additional Text (Leave Blank If You Do Not Want): The margin was drawn around the clinically apparent lesion. An elliptical shape was then drawn on the skin incorporating the lesion and margins.  Incisions were then made along these lines to the appropriate tissue plane and the lesion was extirpated.
Perilesional Excision Additional Text (Leave Blank If You Do Not Want): The margin was drawn around the clinically apparent lesion. Incisions were then made along these lines to the appropriate tissue plane and the lesion was extirpated.
Repair Performed By Another Provider Text (Leave Blank If You Do Not Want): After the tissue was excised the defect was repaired by another provider.
No Repair - Repaired With Adjacent Surgical Defect Text (Leave Blank If You Do Not Want): After the excision the defect was repaired concurrently with another surgical defect which was in close approximation.
Advancement Flap (Single) Text: The defect edges were debeveled with a #15 scalpel blade.  Given the location of the defect and the proximity to free margins a single advancement flap was deemed most appropriate.  Using a sterile surgical marker, an appropriate advancement flap was drawn incorporating the defect and placing the expected incisions within the relaxed skin tension lines where possible.    The area thus outlined was incised deep to adipose tissue with a #15 scalpel blade.  The skin margins were undermined to an appropriate distance in all directions utilizing iris scissors.
Advancement Flap (Double) Text: The defect edges were debeveled with a #15 scalpel blade.  Given the location of the defect and the proximity to free margins a double advancement flap was deemed most appropriate.  Using a sterile surgical marker, the appropriate advancement flaps were drawn incorporating the defect and placing the expected incisions within the relaxed skin tension lines where possible.    The area thus outlined was incised deep to adipose tissue with a #15 scalpel blade.  The skin margins were undermined to an appropriate distance in all directions utilizing iris scissors.
Burow's Advancement Flap Text: The defect edges were debeveled with a #15 scalpel blade.  Given the location of the defect and the proximity to free margins a Burow's advancement flap was deemed most appropriate.  Using a sterile surgical marker, the appropriate advancement flap was drawn incorporating the defect and placing the expected incisions within the relaxed skin tension lines where possible.    The area thus outlined was incised deep to adipose tissue with a #15 scalpel blade.  The skin margins were undermined to an appropriate distance in all directions utilizing iris scissors.
Chonodrocutaneous Helical Advancement Flap Text: The defect edges were debeveled with a #15 scalpel blade.  Given the location of the defect and the proximity to free margins a chondrocutaneous helical advancement flap was deemed most appropriate.  Using a sterile surgical marker, the appropriate advancement flap was drawn incorporating the defect and placing the expected incisions within the relaxed skin tension lines where possible.    The area thus outlined was incised deep to adipose tissue with a #15 scalpel blade.  The skin margins were undermined to an appropriate distance in all directions utilizing iris scissors.
Crescentic Advancement Flap Text: The defect edges were debeveled with a #15 scalpel blade.  Given the location of the defect and the proximity to free margins a crescentic advancement flap was deemed most appropriate.  Using a sterile surgical marker, the appropriate advancement flap was drawn incorporating the defect and placing the expected incisions within the relaxed skin tension lines where possible.    The area thus outlined was incised deep to adipose tissue with a #15 scalpel blade.  The skin margins were undermined to an appropriate distance in all directions utilizing iris scissors.
A-T Advancement Flap Text: The defect edges were debeveled with a #15 scalpel blade.  Given the location of the defect, shape of the defect and the proximity to free margins an A-T advancement flap was deemed most appropriate.  Using a sterile surgical marker, an appropriate advancement flap was drawn incorporating the defect and placing the expected incisions within the relaxed skin tension lines where possible.    The area thus outlined was incised deep to adipose tissue with a #15 scalpel blade.  The skin margins were undermined to an appropriate distance in all directions utilizing iris scissors.
O-T Advancement Flap Text: The defect edges were debeveled with a #15 scalpel blade.  Given the location of the defect, shape of the defect and the proximity to free margins an O-T advancement flap was deemed most appropriate.  Using a sterile surgical marker, an appropriate advancement flap was drawn incorporating the defect and placing the expected incisions within the relaxed skin tension lines where possible.    The area thus outlined was incised deep to adipose tissue with a #15 scalpel blade.  The skin margins were undermined to an appropriate distance in all directions utilizing iris scissors.
O-L Flap Text: The defect edges were debeveled with a #15 scalpel blade.  Given the location of the defect, shape of the defect and the proximity to free margins an O-L flap was deemed most appropriate.  Using a sterile surgical marker, an appropriate advancement flap was drawn incorporating the defect and placing the expected incisions within the relaxed skin tension lines where possible.    The area thus outlined was incised deep to adipose tissue with a #15 scalpel blade.  The skin margins were undermined to an appropriate distance in all directions utilizing iris scissors.
O-Z Flap Text: The defect edges were debeveled with a #15 scalpel blade.  Given the location of the defect, shape of the defect and the proximity to free margins an O-Z flap was deemed most appropriate.  Using a sterile surgical marker, an appropriate transposition flap was drawn incorporating the defect and placing the expected incisions within the relaxed skin tension lines where possible. The area thus outlined was incised deep to adipose tissue with a #15 scalpel blade.  The skin margins were undermined to an appropriate distance in all directions utilizing iris scissors.
Double O-Z Flap Text: The defect edges were debeveled with a #15 scalpel blade.  Given the location of the defect, shape of the defect and the proximity to free margins a Double O-Z flap was deemed most appropriate.  Using a sterile surgical marker, an appropriate transposition flap was drawn incorporating the defect and placing the expected incisions within the relaxed skin tension lines where possible. The area thus outlined was incised deep to adipose tissue with a #15 scalpel blade.  The skin margins were undermined to an appropriate distance in all directions utilizing iris scissors.
V-Y Flap Text: The defect edges were debeveled with a #15 scalpel blade.  Given the location of the defect, shape of the defect and the proximity to free margins a V-Y flap was deemed most appropriate.  Using a sterile surgical marker, an appropriate advancement flap was drawn incorporating the defect and placing the expected incisions within the relaxed skin tension lines where possible.    The area thus outlined was incised deep to adipose tissue with a #15 scalpel blade.  The skin margins were undermined to an appropriate distance in all directions utilizing iris scissors.
Advancement-Rotation Flap Text: The defect edges were debeveled with a #15 scalpel blade.  Given the location of the defect, shape of the defect and the proximity to free margins an advancement-rotation flap was deemed most appropriate.  Using a sterile surgical marker, an appropriate flap was drawn incorporating the defect and placing the expected incisions within the relaxed skin tension lines where possible. The area thus outlined was incised deep to adipose tissue with a #15 scalpel blade.  The skin margins were undermined to an appropriate distance in all directions utilizing iris scissors.
Mercedes Flap Text: The defect edges were debeveled with a #15 scalpel blade.  Given the location of the defect, shape of the defect and the proximity to free margins a Mercedes flap was deemed most appropriate.  Using a sterile surgical marker, an appropriate advancement flap was drawn incorporating the defect and placing the expected incisions within the relaxed skin tension lines where possible. The area thus outlined was incised deep to adipose tissue with a #15 scalpel blade.  The skin margins were undermined to an appropriate distance in all directions utilizing iris scissors.
Modified Advancement Flap Text: The defect edges were debeveled with a #15 scalpel blade.  Given the location of the defect, shape of the defect and the proximity to free margins a modified advancement flap was deemed most appropriate.  Using a sterile surgical marker, an appropriate advancement flap was drawn incorporating the defect and placing the expected incisions within the relaxed skin tension lines where possible.    The area thus outlined was incised deep to adipose tissue with a #15 scalpel blade.  The skin margins were undermined to an appropriate distance in all directions utilizing iris scissors.
Mucosal Advancement Flap Text: Given the location of the defect, shape of the defect and the proximity to free margins a mucosal advancement flap was deemed most appropriate. Incisions were made with a 15 blade scalpel in the appropriate fashion along the cutaneous vermilion border and the mucosal lip. The remaining actinically damaged mucosal tissue was excised.  The mucosal advancement flap was then elevated to the gingival sulcus with care taken to preserve the neurovascular structures and advanced into the primary defect. Care was taken to ensure that precise realignment of the vermilion border was achieved.
Peng Advancement Flap Text: The defect edges were debeveled with a #15 scalpel blade.  Given the location of the defect, shape of the defect and the proximity to free margins a Peng advancement flap was deemed most appropriate.  Using a sterile surgical marker, an appropriate advancement flap was drawn incorporating the defect and placing the expected incisions within the relaxed skin tension lines where possible. The area thus outlined was incised deep to adipose tissue with a #15 scalpel blade.  The skin margins were undermined to an appropriate distance in all directions utilizing iris scissors.
Hatchet Flap Text: The defect edges were debeveled with a #15 scalpel blade.  Given the location of the defect, shape of the defect and the proximity to free margins a hatchet flap was deemed most appropriate.  Using a sterile surgical marker, an appropriate hatchet flap was drawn incorporating the defect and placing the expected incisions within the relaxed skin tension lines where possible.    The area thus outlined was incised deep to adipose tissue with a #15 scalpel blade.  The skin margins were undermined to an appropriate distance in all directions utilizing iris scissors.
Rotation Flap Text: The defect edges were debeveled with a #15 scalpel blade.  Given the location of the defect, shape of the defect and the proximity to free margins a rotation flap was deemed most appropriate.  Using a sterile surgical marker, an appropriate rotation flap was drawn incorporating the defect and placing the expected incisions within the relaxed skin tension lines where possible.    The area thus outlined was incised deep to adipose tissue with a #15 scalpel blade.  The skin margins were undermined to an appropriate distance in all directions utilizing iris scissors.
Spiral Flap Text: The defect edges were debeveled with a #15 scalpel blade.  Given the location of the defect, shape of the defect and the proximity to free margins a spiral flap was deemed most appropriate.  Using a sterile surgical marker, an appropriate rotation flap was drawn incorporating the defect and placing the expected incisions within the relaxed skin tension lines where possible. The area thus outlined was incised deep to adipose tissue with a #15 scalpel blade.  The skin margins were undermined to an appropriate distance in all directions utilizing iris scissors.
Star Wedge Flap Text: The defect edges were debeveled with a #15 scalpel blade.  Given the location of the defect, shape of the defect and the proximity to free margins a star wedge flap was deemed most appropriate.  Using a sterile surgical marker, an appropriate rotation flap was drawn incorporating the defect and placing the expected incisions within the relaxed skin tension lines where possible. The area thus outlined was incised deep to adipose tissue with a #15 scalpel blade.  The skin margins were undermined to an appropriate distance in all directions utilizing iris scissors.
Transposition Flap Text: The defect edges were debeveled with a #15 scalpel blade.  Given the location of the defect and the proximity to free margins a transposition flap was deemed most appropriate.  Using a sterile surgical marker, an appropriate transposition flap was drawn incorporating the defect.    The area thus outlined was incised deep to adipose tissue with a #15 scalpel blade.  The skin margins were undermined to an appropriate distance in all directions utilizing iris scissors.
Muscle Hinge Flap Text: The defect edges were debeveled with a #15 scalpel blade.  Given the size, depth and location of the defect and the proximity to free margins a muscle hinge flap was deemed most appropriate.  Using a sterile surgical marker, an appropriate hinge flap was drawn incorporating the defect. The area thus outlined was incised with a #15 scalpel blade.  The skin margins were undermined to an appropriate distance in all directions utilizing iris scissors.
Melolabial Transposition Flap Text: The defect edges were debeveled with a #15 scalpel blade.  Given the location of the defect and the proximity to free margins a melolabial flap was deemed most appropriate.  Using a sterile surgical marker, an appropriate melolabial transposition flap was drawn incorporating the defect.    The area thus outlined was incised deep to adipose tissue with a #15 scalpel blade.  The skin margins were undermined to an appropriate distance in all directions utilizing iris scissors.
Rhombic Flap Text: The defect edges were debeveled with a #15 scalpel blade.  Given the location of the defect and the proximity to free margins a rhombic flap was deemed most appropriate.  Using a sterile surgical marker, an appropriate rhombic flap was drawn incorporating the defect.    The area thus outlined was incised deep to adipose tissue with a #15 scalpel blade.  The skin margins were undermined to an appropriate distance in all directions utilizing iris scissors.
Rhomboid Transposition Flap Text: The defect edges were debeveled with a #15 scalpel blade.  Given the location of the defect and the proximity to free margins a rhomboid transposition flap was deemed most appropriate.  Using a sterile surgical marker, an appropriate rhomboid flap was drawn incorporating the defect.    The area thus outlined was incised deep to adipose tissue with a #15 scalpel blade.  The skin margins were undermined to an appropriate distance in all directions utilizing iris scissors.
Bi-Rhombic Flap Text: The defect edges were debeveled with a #15 scalpel blade.  Given the location of the defect and the proximity to free margins a bi-rhombic flap was deemed most appropriate.  Using a sterile surgical marker, an appropriate rhombic flap was drawn incorporating the defect. The area thus outlined was incised deep to adipose tissue with a #15 scalpel blade.  The skin margins were undermined to an appropriate distance in all directions utilizing iris scissors.
Helical Rim Advancement Flap Text: The defect edges were debeveled with a #15 blade scalpel.  Given the location of the defect and the proximity to free margins (helical rim) a double helical rim advancement flap was deemed most appropriate.  Using a sterile surgical marker, the appropriate advancement flaps were drawn incorporating the defect and placing the expected incisions between the helical rim and antihelix where possible.  The area thus outlined was incised through and through with a #15 scalpel blade.  With a skin hook and iris scissors, the flaps were gently and sharply undermined and freed up.
Bilateral Helical Rim Advancement Flap Text: The defect edges were debeveled with a #15 blade scalpel.  Given the location of the defect and the proximity to free margins (helical rim) a bilateral helical rim advancement flap was deemed most appropriate.  Using a sterile surgical marker, the appropriate advancement flaps were drawn incorporating the defect and placing the expected incisions between the helical rim and antihelix where possible.  The area thus outlined was incised through and through with a #15 scalpel blade.  With a skin hook and iris scissors, the flaps were gently and sharply undermined and freed up.
Ear Star Wedge Flap Text: The defect edges were debeveled with a #15 blade scalpel.  Given the location of the defect and the proximity to free margins (helical rim) an ear star wedge flap was deemed most appropriate.  Using a sterile surgical marker, the appropriate flap was drawn incorporating the defect and placing the expected incisions between the helical rim and antihelix where possible.  The area thus outlined was incised through and through with a #15 scalpel blade.
Banner Transposition Flap Text: The defect edges were debeveled with a #15 scalpel blade.  Given the location of the defect and the proximity to free margins a Banner transposition flap was deemed most appropriate.  Using a sterile surgical marker, an appropriate flap drawn around the defect. The area thus outlined was incised deep to adipose tissue with a #15 scalpel blade.  The skin margins were undermined to an appropriate distance in all directions utilizing iris scissors.
Bilobed Flap Text: The defect edges were debeveled with a #15 scalpel blade.  Given the location of the defect and the proximity to free margins a bilobe flap was deemed most appropriate.  Using a sterile surgical marker, an appropriate bilobe flap drawn around the defect.    The area thus outlined was incised deep to adipose tissue with a #15 scalpel blade.  The skin margins were undermined to an appropriate distance in all directions utilizing iris scissors.
Bilobed Transposition Flap Text: The defect edges were debeveled with a #15 scalpel blade.  Given the location of the defect and the proximity to free margins a bilobed transposition flap was deemed most appropriate.  Using a sterile surgical marker, an appropriate bilobe flap drawn around the defect.    The area thus outlined was incised deep to adipose tissue with a #15 scalpel blade.  The skin margins were undermined to an appropriate distance in all directions utilizing iris scissors.
Trilobed Flap Text: The defect edges were debeveled with a #15 scalpel blade.  Given the location of the defect and the proximity to free margins a trilobed flap was deemed most appropriate.  Using a sterile surgical marker, an appropriate trilobed flap drawn around the defect.    The area thus outlined was incised deep to adipose tissue with a #15 scalpel blade.  The skin margins were undermined to an appropriate distance in all directions utilizing iris scissors.
Dorsal Nasal Flap Text: The defect edges were debeveled with a #15 scalpel blade.  Given the location of the defect and the proximity to free margins a dorsal nasal flap was deemed most appropriate.  Using a sterile surgical marker, an appropriate dorsal nasal flap was drawn around the defect.    The area thus outlined was incised deep to adipose tissue with a #15 scalpel blade.  The skin margins were undermined to an appropriate distance in all directions utilizing iris scissors.
Island Pedicle Flap Text: The defect edges were debeveled with a #15 scalpel blade.  Given the location of the defect, shape of the defect and the proximity to free margins an island pedicle advancement flap was deemed most appropriate.  Using a sterile surgical marker, an appropriate advancement flap was drawn incorporating the defect, outlining the appropriate donor tissue and placing the expected incisions within the relaxed skin tension lines where possible.    The area thus outlined was incised deep to adipose tissue with a #15 scalpel blade.  The skin margins were undermined to an appropriate distance in all directions around the primary defect and laterally outward around the island pedicle utilizing iris scissors.  There was minimal undermining beneath the pedicle flap.
Island Pedicle Flap With Canthal Suspension Text: The defect edges were debeveled with a #15 scalpel blade.  Given the location of the defect, shape of the defect and the proximity to free margins an island pedicle advancement flap was deemed most appropriate.  Using a sterile surgical marker, an appropriate advancement flap was drawn incorporating the defect, outlining the appropriate donor tissue and placing the expected incisions within the relaxed skin tension lines where possible. The area thus outlined was incised deep to adipose tissue with a #15 scalpel blade.  The skin margins were undermined to an appropriate distance in all directions around the primary defect and laterally outward around the island pedicle utilizing iris scissors.  There was minimal undermining beneath the pedicle flap. A suspension suture was placed in the canthal tendon to prevent tension and prevent ectropion.
Alar Island Pedicle Flap Text: The defect edges were debeveled with a #15 scalpel blade.  Given the location of the defect, shape of the defect and the proximity to the alar rim an island pedicle advancement flap was deemed most appropriate.  Using a sterile surgical marker, an appropriate advancement flap was drawn incorporating the defect, outlining the appropriate donor tissue and placing the expected incisions within the nasal ala running parallel to the alar rim. The area thus outlined was incised with a #15 scalpel blade.  The skin margins were undermined minimally to an appropriate distance in all directions around the primary defect and laterally outward around the island pedicle utilizing iris scissors.  There was minimal undermining beneath the pedicle flap.
Double Island Pedicle Flap Text: The defect edges were debeveled with a #15 scalpel blade.  Given the location of the defect, shape of the defect and the proximity to free margins a double island pedicle advancement flap was deemed most appropriate.  Using a sterile surgical marker, an appropriate advancement flap was drawn incorporating the defect, outlining the appropriate donor tissue and placing the expected incisions within the relaxed skin tension lines where possible.    The area thus outlined was incised deep to adipose tissue with a #15 scalpel blade.  The skin margins were undermined to an appropriate distance in all directions around the primary defect and laterally outward around the island pedicle utilizing iris scissors.  There was minimal undermining beneath the pedicle flap.
Island Pedicle Flap-Requiring Vessel Identification Text: The defect edges were debeveled with a #15 scalpel blade.  Given the location of the defect, shape of the defect and the proximity to free margins an island pedicle advancement flap was deemed most appropriate.  Using a sterile surgical marker, an appropriate advancement flap was drawn, based on the axial vessel mentioned above, incorporating the defect, outlining the appropriate donor tissue and placing the expected incisions within the relaxed skin tension lines where possible.    The area thus outlined was incised deep to adipose tissue with a #15 scalpel blade.  The skin margins were undermined to an appropriate distance in all directions around the primary defect and laterally outward around the island pedicle utilizing iris scissors.  There was minimal undermining beneath the pedicle flap.
Keystone Flap Text: The defect edges were debeveled with a #15 scalpel blade.  Given the location of the defect, shape of the defect a keystone flap was deemed most appropriate.  Using a sterile surgical marker, an appropriate keystone flap was drawn incorporating the defect, outlining the appropriate donor tissue and placing the expected incisions within the relaxed skin tension lines where possible. The area thus outlined was incised deep to adipose tissue with a #15 scalpel blade.  The skin margins were undermined to an appropriate distance in all directions around the primary defect and laterally outward around the flap utilizing iris scissors.
O-T Plasty Text: The defect edges were debeveled with a #15 scalpel blade.  Given the location of the defect, shape of the defect and the proximity to free margins an O-T plasty was deemed most appropriate.  Using a sterile surgical marker, an appropriate O-T plasty was drawn incorporating the defect and placing the expected incisions within the relaxed skin tension lines where possible.    The area thus outlined was incised deep to adipose tissue with a #15 scalpel blade.  The skin margins were undermined to an appropriate distance in all directions utilizing iris scissors.
O-Z Plasty Text: The defect edges were debeveled with a #15 scalpel blade.  Given the location of the defect, shape of the defect and the proximity to free margins an O-Z plasty (double transposition flap) was deemed most appropriate.  Using a sterile surgical marker, the appropriate transposition flaps were drawn incorporating the defect and placing the expected incisions within the relaxed skin tension lines where possible.    The area thus outlined was incised deep to adipose tissue with a #15 scalpel blade.  The skin margins were undermined to an appropriate distance in all directions utilizing iris scissors.  Hemostasis was achieved with electrocautery.  The flaps were then transposed into place, one clockwise and the other counterclockwise, and anchored with interrupted buried subcutaneous sutures.
Double O-Z Plasty Text: The defect edges were debeveled with a #15 scalpel blade.  Given the location of the defect, shape of the defect and the proximity to free margins a Double O-Z plasty (double transposition flap) was deemed most appropriate.  Using a sterile surgical marker, the appropriate transposition flaps were drawn incorporating the defect and placing the expected incisions within the relaxed skin tension lines where possible. The area thus outlined was incised deep to adipose tissue with a #15 scalpel blade.  The skin margins were undermined to an appropriate distance in all directions utilizing iris scissors.  Hemostasis was achieved with electrocautery.  The flaps were then transposed into place, one clockwise and the other counterclockwise, and anchored with interrupted buried subcutaneous sutures.
V-Y Plasty Text: The defect edges were debeveled with a #15 scalpel blade.  Given the location of the defect, shape of the defect and the proximity to free margins an V-Y advancement flap was deemed most appropriate.  Using a sterile surgical marker, an appropriate advancement flap was drawn incorporating the defect and placing the expected incisions within the relaxed skin tension lines where possible.    The area thus outlined was incised deep to adipose tissue with a #15 scalpel blade.  The skin margins were undermined to an appropriate distance in all directions utilizing iris scissors.
H Plasty Text: Given the location of the defect, shape of the defect and the proximity to free margins a H-plasty was deemed most appropriate for repair.  Using a sterile surgical marker, the appropriate advancement arms of the H-plasty were drawn incorporating the defect and placing the expected incisions within the relaxed skin tension lines where possible. The area thus outlined was incised deep to adipose tissue with a #15 scalpel blade. The skin margins were undermined to an appropriate distance in all directions utilizing iris scissors.  The opposing advancement arms were then advanced into place in opposite direction and anchored with interrupted buried subcutaneous sutures.
W Plasty Text: The lesion was extirpated to the level of the fat with a #15 scalpel blade.  Given the location of the defect, shape of the defect and the proximity to free margins a W-plasty was deemed most appropriate for repair.  Using a sterile surgical marker, the appropriate transposition arms of the W-plasty were drawn incorporating the defect and placing the expected incisions within the relaxed skin tension lines where possible.    The area thus outlined was incised deep to adipose tissue with a #15 scalpel blade.  The skin margins were undermined to an appropriate distance in all directions utilizing iris scissors.  The opposing transposition arms were then transposed into place in opposite direction and anchored with interrupted buried subcutaneous sutures.
Z Plasty Text: The lesion was extirpated to the level of the fat with a #15 scalpel blade.  Given the location of the defect, shape of the defect and the proximity to free margins a Z-plasty was deemed most appropriate for repair.  Using a sterile surgical marker, the appropriate transposition arms of the Z-plasty were drawn incorporating the defect and placing the expected incisions within the relaxed skin tension lines where possible.    The area thus outlined was incised deep to adipose tissue with a #15 scalpel blade.  The skin margins were undermined to an appropriate distance in all directions utilizing iris scissors.  The opposing transposition arms were then transposed into place in opposite direction and anchored with interrupted buried subcutaneous sutures.
Zygomaticofacial Flap Text: Given the location of the defect, shape of the defect and the proximity to free margins a zygomaticofacial flap was deemed most appropriate for repair.  Using a sterile surgical marker, the appropriate flap was drawn incorporating the defect and placing the expected incisions within the relaxed skin tension lines where possible. The area thus outlined was incised deep to adipose tissue with a #15 scalpel blade with preservation of a vascular pedicle.  The skin margins were undermined to an appropriate distance in all directions utilizing iris scissors.  The flap was then placed into the defect and anchored with interrupted buried subcutaneous sutures.
Cheek Interpolation Flap Text: A decision was made to reconstruct the defect utilizing an interpolation axial flap and a staged reconstruction.  A telfa template was made of the defect.  This telfa template was then used to outline the Cheek Interpolation flap.  The donor area for the pedicle flap was then injected with anesthesia.  The flap was excised through the skin and subcutaneous tissue down to the layer of the underlying musculature.  The interpolation flap was carefully excised within this deep plane to maintain its blood supply.  The edges of the donor site were undermined.   The donor site was closed in a primary fashion.  The pedicle was then rotated into position and sutured.  Once the tube was sutured into place, adequate blood supply was confirmed with blanching and refill.  The pedicle was then wrapped with xeroform gauze and dressed appropriately with a telfa and gauze bandage to ensure continued blood supply and protect the attached pedicle.
Cheek-To-Nose Interpolation Flap Text: A decision was made to reconstruct the defect utilizing an interpolation axial flap and a staged reconstruction.  A telfa template was made of the defect.  This telfa template was then used to outline the Cheek-To-Nose Interpolation flap.  The donor area for the pedicle flap was then injected with anesthesia.  The flap was excised through the skin and subcutaneous tissue down to the layer of the underlying musculature.  The interpolation flap was carefully excised within this deep plane to maintain its blood supply.  The edges of the donor site were undermined.   The donor site was closed in a primary fashion.  The pedicle was then rotated into position and sutured.  Once the tube was sutured into place, adequate blood supply was confirmed with blanching and refill.  The pedicle was then wrapped with xeroform gauze and dressed appropriately with a telfa and gauze bandage to ensure continued blood supply and protect the attached pedicle.
Interpolation Flap Text: A decision was made to reconstruct the defect utilizing an interpolation axial flap and a staged reconstruction.  A telfa template was made of the defect.  This telfa template was then used to outline the interpolation flap.  The donor area for the pedicle flap was then injected with anesthesia.  The flap was excised through the skin and subcutaneous tissue down to the layer of the underlying musculature.  The interpolation flap was carefully excised within this deep plane to maintain its blood supply.  The edges of the donor site were undermined.   The donor site was closed in a primary fashion.  The pedicle was then rotated into position and sutured.  Once the tube was sutured into place, adequate blood supply was confirmed with blanching and refill.  The pedicle was then wrapped with xeroform gauze and dressed appropriately with a telfa and gauze bandage to ensure continued blood supply and protect the attached pedicle.
Melolabial Interpolation Flap Text: A decision was made to reconstruct the defect utilizing an interpolation axial flap and a staged reconstruction.  A telfa template was made of the defect.  This telfa template was then used to outline the melolabial interpolation flap.  The donor area for the pedicle flap was then injected with anesthesia.  The flap was excised through the skin and subcutaneous tissue down to the layer of the underlying musculature.  The pedicle flap was carefully excised within this deep plane to maintain its blood supply.  The edges of the donor site were undermined.   The donor site was closed in a primary fashion.  The pedicle was then rotated into position and sutured.  Once the tube was sutured into place, adequate blood supply was confirmed with blanching and refill.  The pedicle was then wrapped with xeroform gauze and dressed appropriately with a telfa and gauze bandage to ensure continued blood supply and protect the attached pedicle.
Mastoid Interpolation Flap Text: A decision was made to reconstruct the defect utilizing an interpolation axial flap and a staged reconstruction.  A telfa template was made of the defect.  This telfa template was then used to outline the mastoid interpolation flap.  The donor area for the pedicle flap was then injected with anesthesia.  The flap was excised through the skin and subcutaneous tissue down to the layer of the underlying musculature.  The pedicle flap was carefully excised within this deep plane to maintain its blood supply.  The edges of the donor site were undermined.   The donor site was closed in a primary fashion.  The pedicle was then rotated into position and sutured.  Once the tube was sutured into place, adequate blood supply was confirmed with blanching and refill.  The pedicle was then wrapped with xeroform gauze and dressed appropriately with a telfa and gauze bandage to ensure continued blood supply and protect the attached pedicle.
Posterior Auricular Interpolation Flap Text: A decision was made to reconstruct the defect utilizing an interpolation axial flap and a staged reconstruction.  A telfa template was made of the defect.  This telfa template was then used to outline the posterior auricular interpolation flap.  The donor area for the pedicle flap was then injected with anesthesia.  The flap was excised through the skin and subcutaneous tissue down to the layer of the underlying musculature.  The pedicle flap was carefully excised within this deep plane to maintain its blood supply.  The edges of the donor site were undermined.   The donor site was closed in a primary fashion.  The pedicle was then rotated into position and sutured.  Once the tube was sutured into place, adequate blood supply was confirmed with blanching and refill.  The pedicle was then wrapped with xeroform gauze and dressed appropriately with a telfa and gauze bandage to ensure continued blood supply and protect the attached pedicle.
Paramedian Forehead Flap Text: A decision was made to reconstruct the defect utilizing an interpolation axial flap and a staged reconstruction.  A telfa template was made of the defect.  This telfa template was then used to outline the paramedian forehead pedicle flap.  The donor area for the pedicle flap was then injected with anesthesia.  The flap was excised through the skin and subcutaneous tissue down to the layer of the underlying musculature.  The pedicle flap was carefully excised within this deep plane to maintain its blood supply.  The edges of the donor site were undermined.   The donor site was closed in a primary fashion.  The pedicle was then rotated into position and sutured.  Once the tube was sutured into place, adequate blood supply was confirmed with blanching and refill.  The pedicle was then wrapped with xeroform gauze and dressed appropriately with a telfa and gauze bandage to ensure continued blood supply and protect the attached pedicle.
Lip Wedge Excision Repair Text: Given the location of the defect and the proximity to free margins a full thickness wedge repair was deemed most appropriate.  Using a sterile surgical marker, the appropriate repair was drawn incorporating the defect and placing the expected incisions perpendicular to the vermilion border.  The vermilion border was also meticulously outlined to ensure appropriate reapproximation during the repair.  The area thus outlined was incised through and through with a #15 scalpel blade.  The muscularis and dermis were reaproximated with deep sutures following hemostasis. Care was taken to realign the vermilion border before proceeding with the superficial closure.  Once the vermilion was realigned the superfical and mucosal closure was finished.
Ftsg Text: The defect edges were debeveled with a #15 scalpel blade.  Given the location of the defect, shape of the defect and the proximity to free margins a full thickness skin graft was deemed most appropriate.  Using a sterile surgical marker, the primary defect shape was transferred to the donor site. The area thus outlined was incised deep to adipose tissue with a #15 scalpel blade.  The harvested graft was then trimmed of adipose tissue until only dermis and epidermis was left.  The skin margins of the secondary defect were undermined to an appropriate distance in all directions utilizing iris scissors.  The secondary defect was closed with interrupted buried subcutaneous sutures.  The skin edges were then re-apposed with running  sutures.  The skin graft was then placed in the primary defect and oriented appropriately.
Split-Thickness Skin Graft Text: The defect edges were debeveled with a #15 scalpel blade.  Given the location of the defect, shape of the defect and the proximity to free margins a split thickness skin graft was deemed most appropriate.  Using a sterile surgical marker, the primary defect shape was transferred to the donor site. The split thickness graft was then harvested.  The skin graft was then placed in the primary defect and oriented appropriately.
Burow's Graft Text: The defect edges were debeveled with a #15 scalpel blade.  Given the location of the defect, shape of the defect, the proximity to free margins and the presence of a standing cone deformity a Burow's skin graft was deemed most appropriate. The standing cone was removed and this tissue was then trimmed to the shape of the primary defect. The adipose tissue was also removed until only dermis and epidermis were left.  The skin margins of the secondary defect were undermined to an appropriate distance in all directions utilizing iris scissors.  The secondary defect was closed with interrupted buried subcutaneous sutures.  The skin edges were then re-apposed with running  sutures.  The skin graft was then placed in the primary defect and oriented appropriately.
Cartilage Graft Text: The defect edges were debeveled with a #15 scalpel blade.  Given the location of the defect, shape of the defect, the fact the defect involved a full thickness cartilage defect a cartilage graft was deemed most appropriate.  An appropriate donor site was identified, cleansed, and anesthetized. The cartilage graft was then harvested and transferred to the recipient site, oriented appropriately and then sutured into place.  The secondary defect was then repaired using a primary closure.
Composite Graft Text: The defect edges were debeveled with a #15 scalpel blade.  Given the location of the defect, shape of the defect, the proximity to free margins and the fact the defect was full thickness a composite graft was deemed most appropriate.  The defect was outline and then transferred to the donor site.  A full thickness graft was then excised from the donor site. The graft was then placed in the primary defect, oriented appropriately and then sutured into place.  The secondary defect was then repaired using a primary closure.
Epidermal Autograft Text: The defect edges were debeveled with a #15 scalpel blade.  Given the location of the defect, shape of the defect and the proximity to free margins an epidermal autograft was deemed most appropriate.  Using a sterile surgical marker, the primary defect shape was transferred to the donor site. The epidermal graft was then harvested.  The skin graft was then placed in the primary defect and oriented appropriately.
Dermal Autograft Text: The defect edges were debeveled with a #15 scalpel blade.  Given the location of the defect, shape of the defect and the proximity to free margins a dermal autograft was deemed most appropriate.  Using a sterile surgical marker, the primary defect shape was transferred to the donor site. The area thus outlined was incised deep to adipose tissue with a #15 scalpel blade.  The harvested graft was then trimmed of adipose and epidermal tissue until only dermis was left.  The skin graft was then placed in the primary defect and oriented appropriately.
Skin Substitute Text: The defect edges were debeveled with a #15 scalpel blade.  Given the location of the defect, shape of the defect and the proximity to free margins a skin substitute graft was deemed most appropriate.  The graft material was trimmed to fit the size of the defect. The graft was then placed in the primary defect and oriented appropriately.
Tissue Cultured Epidermal Autograft Text: The defect edges were debeveled with a #15 scalpel blade.  Given the location of the defect, shape of the defect and the proximity to free margins a tissue cultured epidermal autograft was deemed most appropriate.  The graft was then trimmed to fit the size of the defect.  The graft was then placed in the primary defect and oriented appropriately.
Xenograft Text: The defect edges were debeveled with a #15 scalpel blade.  Given the location of the defect, shape of the defect and the proximity to free margins a xenograft was deemed most appropriate.  The graft was then trimmed to fit the size of the defect.  The graft was then placed in the primary defect and oriented appropriately.
Purse String (Intermediate) Text: Given the location of the defect and the characteristics of the surrounding skin a purse string intermediate closure was deemed most appropriate.  Undermining was performed circumfirentially around the surgical defect.  A purse string suture was then placed and tightened.
Purse String (Simple) Text: Given the location of the defect and the characteristics of the surrounding skin a purse string simple closure was deemed most appropriate.  Undermining was performed circumferentially around the surgical defect.  A purse string suture was then placed and tightened.
Partial Purse String (Intermediate) Text: Given the location of the defect and the characteristics of the surrounding skin an intermediate purse string closure was deemed most appropriate.  Undermining was performed circumferentially around the surgical defect.  A purse string suture was then placed and tightened. Wound tension of the circular defect prevented complete closure of the wound.
Partial Purse String (Simple) Text: Given the location of the defect and the characteristics of the surrounding skin a simple purse string closure was deemed most appropriate.  Undermining was performed circumferentially around the surgical defect.  A purse string suture was then placed and tightened. Wound tension of the circular defect prevented complete closure of the wound.
Complex Repair And Single Advancement Flap Text: The defect edges were debeveled with a #15 scalpel blade.  The primary defect was closed partially with a complex linear closure.  Given the location of the remaining defect, shape of the defect and the proximity to free margins a single advancement flap was deemed most appropriate for complete closure of the defect.  Using a sterile surgical marker, an appropriate advancement flap was drawn incorporating the defect and placing the expected incisions within the relaxed skin tension lines where possible.    The area thus outlined was incised deep to adipose tissue with a #15 scalpel blade.  The skin margins were undermined to an appropriate distance in all directions utilizing iris scissors.
Complex Repair And Double Advancement Flap Text: The defect edges were debeveled with a #15 scalpel blade.  The primary defect was closed partially with a complex linear closure.  Given the location of the remaining defect, shape of the defect and the proximity to free margins a double advancement flap was deemed most appropriate for complete closure of the defect.  Using a sterile surgical marker, an appropriate advancement flap was drawn incorporating the defect and placing the expected incisions within the relaxed skin tension lines where possible.    The area thus outlined was incised deep to adipose tissue with a #15 scalpel blade.  The skin margins were undermined to an appropriate distance in all directions utilizing iris scissors.
Complex Repair And Modified Advancement Flap Text: The defect edges were debeveled with a #15 scalpel blade.  The primary defect was closed partially with a complex linear closure.  Given the location of the remaining defect, shape of the defect and the proximity to free margins a modified advancement flap was deemed most appropriate for complete closure of the defect.  Using a sterile surgical marker, an appropriate advancement flap was drawn incorporating the defect and placing the expected incisions within the relaxed skin tension lines where possible.    The area thus outlined was incised deep to adipose tissue with a #15 scalpel blade.  The skin margins were undermined to an appropriate distance in all directions utilizing iris scissors.
Complex Repair And A-T Advancement Flap Text: The defect edges were debeveled with a #15 scalpel blade.  The primary defect was closed partially with a complex linear closure.  Given the location of the remaining defect, shape of the defect and the proximity to free margins an A-T advancement flap was deemed most appropriate for complete closure of the defect.  Using a sterile surgical marker, an appropriate advancement flap was drawn incorporating the defect and placing the expected incisions within the relaxed skin tension lines where possible.    The area thus outlined was incised deep to adipose tissue with a #15 scalpel blade.  The skin margins were undermined to an appropriate distance in all directions utilizing iris scissors.
Complex Repair And O-T Advancement Flap Text: The defect edges were debeveled with a #15 scalpel blade.  The primary defect was closed partially with a complex linear closure.  Given the location of the remaining defect, shape of the defect and the proximity to free margins an O-T advancement flap was deemed most appropriate for complete closure of the defect.  Using a sterile surgical marker, an appropriate advancement flap was drawn incorporating the defect and placing the expected incisions within the relaxed skin tension lines where possible.    The area thus outlined was incised deep to adipose tissue with a #15 scalpel blade.  The skin margins were undermined to an appropriate distance in all directions utilizing iris scissors.
Complex Repair And O-L Flap Text: The defect edges were debeveled with a #15 scalpel blade.  The primary defect was closed partially with a complex linear closure.  Given the location of the remaining defect, shape of the defect and the proximity to free margins an O-L flap was deemed most appropriate for complete closure of the defect.  Using a sterile surgical marker, an appropriate flap was drawn incorporating the defect and placing the expected incisions within the relaxed skin tension lines where possible.    The area thus outlined was incised deep to adipose tissue with a #15 scalpel blade.  The skin margins were undermined to an appropriate distance in all directions utilizing iris scissors.
Complex Repair And Bilobe Flap Text: The defect edges were debeveled with a #15 scalpel blade.  The primary defect was closed partially with a complex linear closure.  Given the location of the remaining defect, shape of the defect and the proximity to free margins a bilobe flap was deemed most appropriate for complete closure of the defect.  Using a sterile surgical marker, an appropriate advancement flap was drawn incorporating the defect and placing the expected incisions within the relaxed skin tension lines where possible.    The area thus outlined was incised deep to adipose tissue with a #15 scalpel blade.  The skin margins were undermined to an appropriate distance in all directions utilizing iris scissors.
Complex Repair And Melolabial Flap Text: The defect edges were debeveled with a #15 scalpel blade.  The primary defect was closed partially with a complex linear closure.  Given the location of the remaining defect, shape of the defect and the proximity to free margins a melolabial flap was deemed most appropriate for complete closure of the defect.  Using a sterile surgical marker, an appropriate advancement flap was drawn incorporating the defect and placing the expected incisions within the relaxed skin tension lines where possible.    The area thus outlined was incised deep to adipose tissue with a #15 scalpel blade.  The skin margins were undermined to an appropriate distance in all directions utilizing iris scissors.
Complex Repair And Rotation Flap Text: The defect edges were debeveled with a #15 scalpel blade.  The primary defect was closed partially with a complex linear closure.  Given the location of the remaining defect, shape of the defect and the proximity to free margins a rotation flap was deemed most appropriate for complete closure of the defect.  Using a sterile surgical marker, an appropriate advancement flap was drawn incorporating the defect and placing the expected incisions within the relaxed skin tension lines where possible.    The area thus outlined was incised deep to adipose tissue with a #15 scalpel blade.  The skin margins were undermined to an appropriate distance in all directions utilizing iris scissors.
Complex Repair And Rhombic Flap Text: The defect edges were debeveled with a #15 scalpel blade.  The primary defect was closed partially with a complex linear closure.  Given the location of the remaining defect, shape of the defect and the proximity to free margins a rhombic flap was deemed most appropriate for complete closure of the defect.  Using a sterile surgical marker, an appropriate advancement flap was drawn incorporating the defect and placing the expected incisions within the relaxed skin tension lines where possible.    The area thus outlined was incised deep to adipose tissue with a #15 scalpel blade.  The skin margins were undermined to an appropriate distance in all directions utilizing iris scissors.
Complex Repair And Transposition Flap Text: The defect edges were debeveled with a #15 scalpel blade.  The primary defect was closed partially with a complex linear closure.  Given the location of the remaining defect, shape of the defect and the proximity to free margins a transposition flap was deemed most appropriate for complete closure of the defect.  Using a sterile surgical marker, an appropriate advancement flap was drawn incorporating the defect and placing the expected incisions within the relaxed skin tension lines where possible.    The area thus outlined was incised deep to adipose tissue with a #15 scalpel blade.  The skin margins were undermined to an appropriate distance in all directions utilizing iris scissors.
Complex Repair And V-Y Plasty Text: The defect edges were debeveled with a #15 scalpel blade.  The primary defect was closed partially with a complex linear closure.  Given the location of the remaining defect, shape of the defect and the proximity to free margins a V-Y plasty was deemed most appropriate for complete closure of the defect.  Using a sterile surgical marker, an appropriate advancement flap was drawn incorporating the defect and placing the expected incisions within the relaxed skin tension lines where possible.    The area thus outlined was incised deep to adipose tissue with a #15 scalpel blade.  The skin margins were undermined to an appropriate distance in all directions utilizing iris scissors.
Complex Repair And M Plasty Text: The defect edges were debeveled with a #15 scalpel blade.  The primary defect was closed partially with a complex linear closure.  Given the location of the remaining defect, shape of the defect and the proximity to free margins an M plasty was deemed most appropriate for complete closure of the defect.  Using a sterile surgical marker, an appropriate advancement flap was drawn incorporating the defect and placing the expected incisions within the relaxed skin tension lines where possible.    The area thus outlined was incised deep to adipose tissue with a #15 scalpel blade.  The skin margins were undermined to an appropriate distance in all directions utilizing iris scissors.
Complex Repair And Double M Plasty Text: The defect edges were debeveled with a #15 scalpel blade.  The primary defect was closed partially with a complex linear closure.  Given the location of the remaining defect, shape of the defect and the proximity to free margins a double M plasty was deemed most appropriate for complete closure of the defect.  Using a sterile surgical marker, an appropriate advancement flap was drawn incorporating the defect and placing the expected incisions within the relaxed skin tension lines where possible.    The area thus outlined was incised deep to adipose tissue with a #15 scalpel blade.  The skin margins were undermined to an appropriate distance in all directions utilizing iris scissors.
Complex Repair And W Plasty Text: The defect edges were debeveled with a #15 scalpel blade.  The primary defect was closed partially with a complex linear closure.  Given the location of the remaining defect, shape of the defect and the proximity to free margins a W plasty was deemed most appropriate for complete closure of the defect.  Using a sterile surgical marker, an appropriate advancement flap was drawn incorporating the defect and placing the expected incisions within the relaxed skin tension lines where possible.    The area thus outlined was incised deep to adipose tissue with a #15 scalpel blade.  The skin margins were undermined to an appropriate distance in all directions utilizing iris scissors.
Complex Repair And Z Plasty Text: The defect edges were debeveled with a #15 scalpel blade.  The primary defect was closed partially with a complex linear closure.  Given the location of the remaining defect, shape of the defect and the proximity to free margins a Z plasty was deemed most appropriate for complete closure of the defect.  Using a sterile surgical marker, an appropriate advancement flap was drawn incorporating the defect and placing the expected incisions within the relaxed skin tension lines where possible.    The area thus outlined was incised deep to adipose tissue with a #15 scalpel blade.  The skin margins were undermined to an appropriate distance in all directions utilizing iris scissors.
Complex Repair And Dorsal Nasal Flap Text: The defect edges were debeveled with a #15 scalpel blade.  The primary defect was closed partially with a complex linear closure.  Given the location of the remaining defect, shape of the defect and the proximity to free margins a dorsal nasal flap was deemed most appropriate for complete closure of the defect.  Using a sterile surgical marker, an appropriate flap was drawn incorporating the defect and placing the expected incisions within the relaxed skin tension lines where possible.    The area thus outlined was incised deep to adipose tissue with a #15 scalpel blade.  The skin margins were undermined to an appropriate distance in all directions utilizing iris scissors.
Complex Repair And Ftsg Text: The defect edges were debeveled with a #15 scalpel blade.  The primary defect was closed partially with a complex linear closure.  Given the location of the defect, shape of the defect and the proximity to free margins a full thickness skin graft was deemed most appropriate to repair the remaining defect.  The graft was trimmed to fit the size of the remaining defect.  The graft was then placed in the primary defect, oriented appropriately, and sutured into place.
Complex Repair And Burow's Graft Text: The defect edges were debeveled with a #15 scalpel blade.  The primary defect was closed partially with a complex linear closure.  Given the location of the defect, shape of the defect, the proximity to free margins and the presence of a standing cone deformity a Burow's graft was deemed most appropriate to repair the remaining defect.  The graft was trimmed to fit the size of the remaining defect.  The graft was then placed in the primary defect, oriented appropriately, and sutured into place.
Complex Repair And Split-Thickness Skin Graft Text: The defect edges were debeveled with a #15 scalpel blade.  The primary defect was closed partially with a complex linear closure.  Given the location of the defect, shape of the defect and the proximity to free margins a split thickness skin graft was deemed most appropriate to repair the remaining defect.  The graft was trimmed to fit the size of the remaining defect.  The graft was then placed in the primary defect, oriented appropriately, and sutured into place.
Complex Repair And Epidermal Autograft Text: The defect edges were debeveled with a #15 scalpel blade.  The primary defect was closed partially with a complex linear closure.  Given the location of the defect, shape of the defect and the proximity to free margins an epidermal autograft was deemed most appropriate to repair the remaining defect.  The graft was trimmed to fit the size of the remaining defect.  The graft was then placed in the primary defect, oriented appropriately, and sutured into place.
Complex Repair And Dermal Autograft Text: The defect edges were debeveled with a #15 scalpel blade.  The primary defect was closed partially with a complex linear closure.  Given the location of the defect, shape of the defect and the proximity to free margins an dermal autograft was deemed most appropriate to repair the remaining defect.  The graft was trimmed to fit the size of the remaining defect.  The graft was then placed in the primary defect, oriented appropriately, and sutured into place.
Complex Repair And Tissue Cultured Epidermal Autograft Text: The defect edges were debeveled with a #15 scalpel blade.  The primary defect was closed partially with a complex linear closure.  Given the location of the defect, shape of the defect and the proximity to free margins an tissue cultured epidermal autograft was deemed most appropriate to repair the remaining defect.  The graft was trimmed to fit the size of the remaining defect.  The graft was then placed in the primary defect, oriented appropriately, and sutured into place.
Complex Repair And Xenograft Text: The defect edges were debeveled with a #15 scalpel blade.  The primary defect was closed partially with a complex linear closure.  Given the location of the defect, shape of the defect and the proximity to free margins a xenograft was deemed most appropriate to repair the remaining defect.  The graft was trimmed to fit the size of the remaining defect.  The graft was then placed in the primary defect, oriented appropriately, and sutured into place.
Complex Repair And Skin Substitute Graft Text: The defect edges were debeveled with a #15 scalpel blade.  The primary defect was closed partially with a complex linear closure.  Given the location of the remaining defect, shape of the defect and the proximity to free margins a skin substitute graft was deemed most appropriate to repair the remaining defect.  The graft was trimmed to fit the size of the remaining defect.  The graft was then placed in the primary defect, oriented appropriately, and sutured into place.
Path Notes (To The Dermatopathologist): Please check margins.
Consent: Verbal consent was obtained from the patient. The risks and benefits to therapy were discussed in detail. Specifically, the risks of infection, scarring, bleeding, prolonged wound healing, incomplete removal, allergy to anesthesia, nerve injury and recurrence were addressed. Prior to the procedure, the treatment site was clearly identified and confirmed by the patient. All components of Universal Protocol/PAUSE Rule completed.
Post-Care Instructions: I reviewed with the patient in detail post-care instructions. Patient is not to engage in any heavy lifting, exercise, or swimming for the next 14 days. Should the patient develop any fevers, chills, bleeding, severe pain patient will contact the office immediately.
Where Do You Want The Question To Include Opioid Counseling Located?: Case Summary Tab
Information: Selecting Yes will display possible errors in your note based on the variables you have selected. This validation is only offered as a suggestion for you. PLEASE NOTE THAT THE VALIDATION TEXT WILL BE REMOVED WHEN YOU FINALIZE YOUR NOTE. IF YOU WANT TO FAX A PRELIMINARY NOTE YOU WILL NEED TO TOGGLE THIS TO 'NO' IF YOU DO NOT WANT IT IN YOUR FAXED NOTE.

## 2021-05-18 ENCOUNTER — APPOINTMENT (RX ONLY)
Dept: URBAN - METROPOLITAN AREA CLINIC 4 | Facility: CLINIC | Age: 86
Setting detail: DERMATOLOGY
End: 2021-05-18

## 2021-05-18 DIAGNOSIS — D18.0 HEMANGIOMA: ICD-10-CM

## 2021-05-18 DIAGNOSIS — Z85.828 PERSONAL HISTORY OF OTHER MALIGNANT NEOPLASM OF SKIN: ICD-10-CM

## 2021-05-18 DIAGNOSIS — L82.1 OTHER SEBORRHEIC KERATOSIS: ICD-10-CM

## 2021-05-18 DIAGNOSIS — L91.8 OTHER HYPERTROPHIC DISORDERS OF THE SKIN: ICD-10-CM

## 2021-05-18 DIAGNOSIS — L30.9 DERMATITIS, UNSPECIFIED: ICD-10-CM

## 2021-05-18 DIAGNOSIS — L81.4 OTHER MELANIN HYPERPIGMENTATION: ICD-10-CM

## 2021-05-18 PROBLEM — D18.01 HEMANGIOMA OF SKIN AND SUBCUTANEOUS TISSUE: Status: ACTIVE | Noted: 2021-05-18

## 2021-05-18 PROCEDURE — ? OBSERVATION

## 2021-05-18 PROCEDURE — 99213 OFFICE O/P EST LOW 20 MIN: CPT

## 2021-05-18 PROCEDURE — ? TREATMENT REGIMEN

## 2021-05-18 ASSESSMENT — LOCATION SIMPLE DESCRIPTION DERM
LOCATION SIMPLE: RIGHT FOREARM
LOCATION SIMPLE: RIGHT AXILLARY VAULT
LOCATION SIMPLE: UPPER BACK
LOCATION SIMPLE: RIGHT UPPER BACK
LOCATION SIMPLE: RIGHT ELBOW
LOCATION SIMPLE: LEFT EAR
LOCATION SIMPLE: LEFT LIP
LOCATION SIMPLE: RIGHT SUPERIOR LIP
LOCATION SIMPLE: LEFT FOREARM
LOCATION SIMPLE: LEFT AXILLARY VAULT
LOCATION SIMPLE: RIGHT CHEEK
LOCATION SIMPLE: RIGHT EAR
LOCATION SIMPLE: POSTERIOR NECK

## 2021-05-18 ASSESSMENT — LOCATION DETAILED DESCRIPTION DERM
LOCATION DETAILED: RIGHT PROXIMAL DORSAL FOREARM
LOCATION DETAILED: RIGHT AXILLARY VAULT
LOCATION DETAILED: RIGHT SUPERIOR VERMILION LIP
LOCATION DETAILED: LEFT SUPERIOR POSTERIOR NECK
LOCATION DETAILED: INFERIOR THORACIC SPINE
LOCATION DETAILED: RIGHT INFERIOR UPPER BACK
LOCATION DETAILED: RIGHT ELBOW
LOCATION DETAILED: LEFT INFERIOR CRUS OF ANTIHELIX
LOCATION DETAILED: LEFT DISTAL DORSAL FOREARM
LOCATION DETAILED: LEFT UPPER CUTANEOUS LIP
LOCATION DETAILED: RIGHT MID-UPPER BACK
LOCATION DETAILED: RIGHT PROXIMAL RADIAL DORSAL FOREARM
LOCATION DETAILED: LEFT AXILLARY VAULT
LOCATION DETAILED: LEFT PROXIMAL DORSAL FOREARM
LOCATION DETAILED: RIGHT CENTRAL MALAR CHEEK
LOCATION DETAILED: RIGHT SUPERIOR CRUS OF ANTIHELIX

## 2021-05-18 ASSESSMENT — LOCATION ZONE DERM
LOCATION ZONE: TRUNK
LOCATION ZONE: FACE
LOCATION ZONE: ARM
LOCATION ZONE: NECK
LOCATION ZONE: AXILLAE
LOCATION ZONE: LIP
LOCATION ZONE: EAR

## 2021-05-18 NOTE — PROCEDURE: TREATMENT REGIMEN
Continue Regimen: Triamcinolone paste that was prescribed by dentist
Plan: If this does not resolve he should see an ENT for a biopsy
Detail Level: Detailed

## 2022-06-21 ENCOUNTER — APPOINTMENT (RX ONLY)
Dept: URBAN - METROPOLITAN AREA CLINIC 4 | Facility: CLINIC | Age: 87
Setting detail: DERMATOLOGY
End: 2022-06-21

## 2022-06-21 DIAGNOSIS — D18.0 HEMANGIOMA: ICD-10-CM

## 2022-06-21 DIAGNOSIS — L81.4 OTHER MELANIN HYPERPIGMENTATION: ICD-10-CM

## 2022-06-21 DIAGNOSIS — L91.8 OTHER HYPERTROPHIC DISORDERS OF THE SKIN: ICD-10-CM

## 2022-06-21 DIAGNOSIS — L82.1 OTHER SEBORRHEIC KERATOSIS: ICD-10-CM

## 2022-06-21 DIAGNOSIS — Z85.828 PERSONAL HISTORY OF OTHER MALIGNANT NEOPLASM OF SKIN: ICD-10-CM

## 2022-06-21 PROBLEM — D18.01 HEMANGIOMA OF SKIN AND SUBCUTANEOUS TISSUE: Status: ACTIVE | Noted: 2022-06-21

## 2022-06-21 PROCEDURE — ? OBSERVATION

## 2022-06-21 PROCEDURE — 99213 OFFICE O/P EST LOW 20 MIN: CPT

## 2022-06-21 ASSESSMENT — LOCATION SIMPLE DESCRIPTION DERM
LOCATION SIMPLE: LEFT FOREARM
LOCATION SIMPLE: LEFT LIP
LOCATION SIMPLE: RIGHT ELBOW
LOCATION SIMPLE: RIGHT FOREARM
LOCATION SIMPLE: POSTERIOR NECK
LOCATION SIMPLE: LEFT AXILLARY VAULT
LOCATION SIMPLE: UPPER BACK
LOCATION SIMPLE: RIGHT UPPER BACK
LOCATION SIMPLE: RIGHT EAR
LOCATION SIMPLE: LEFT EAR
LOCATION SIMPLE: RIGHT CHEEK
LOCATION SIMPLE: RIGHT AXILLARY VAULT

## 2022-06-21 ASSESSMENT — LOCATION DETAILED DESCRIPTION DERM
LOCATION DETAILED: RIGHT SUPERIOR CRUS OF ANTIHELIX
LOCATION DETAILED: LEFT AXILLARY VAULT
LOCATION DETAILED: RIGHT PROXIMAL RADIAL DORSAL FOREARM
LOCATION DETAILED: RIGHT MID-UPPER BACK
LOCATION DETAILED: LEFT PROXIMAL DORSAL FOREARM
LOCATION DETAILED: RIGHT INFERIOR UPPER BACK
LOCATION DETAILED: LEFT UPPER CUTANEOUS LIP
LOCATION DETAILED: RIGHT ELBOW
LOCATION DETAILED: LEFT SUPERIOR POSTERIOR NECK
LOCATION DETAILED: LEFT DISTAL DORSAL FOREARM
LOCATION DETAILED: INFERIOR THORACIC SPINE
LOCATION DETAILED: RIGHT AXILLARY VAULT
LOCATION DETAILED: LEFT INFERIOR CRUS OF ANTIHELIX
LOCATION DETAILED: RIGHT PROXIMAL DORSAL FOREARM
LOCATION DETAILED: RIGHT CENTRAL MALAR CHEEK

## 2022-06-21 ASSESSMENT — LOCATION ZONE DERM
LOCATION ZONE: EAR
LOCATION ZONE: LIP
LOCATION ZONE: NECK
LOCATION ZONE: TRUNK
LOCATION ZONE: AXILLAE
LOCATION ZONE: FACE
LOCATION ZONE: ARM

## 2023-05-25 ENCOUNTER — APPOINTMENT (RX ONLY)
Dept: URBAN - METROPOLITAN AREA CLINIC 4 | Facility: CLINIC | Age: 88
Setting detail: DERMATOLOGY
End: 2023-05-25

## 2023-05-25 DIAGNOSIS — L11.1 TRANSIENT ACANTHOLYTIC DERMATOSIS [GROVER]: ICD-10-CM

## 2023-05-25 DIAGNOSIS — L82.1 OTHER SEBORRHEIC KERATOSIS: ICD-10-CM

## 2023-05-25 DIAGNOSIS — D18.0 HEMANGIOMA: ICD-10-CM

## 2023-05-25 PROBLEM — D18.01 HEMANGIOMA OF SKIN AND SUBCUTANEOUS TISSUE: Status: ACTIVE | Noted: 2023-05-25

## 2023-05-25 PROBLEM — D48.5 NEOPLASM OF UNCERTAIN BEHAVIOR OF SKIN: Status: ACTIVE | Noted: 2023-05-25

## 2023-05-25 PROCEDURE — ? COUNSELING

## 2023-05-25 PROCEDURE — 11103 TANGNTL BX SKIN EA SEP/ADDL: CPT

## 2023-05-25 PROCEDURE — 99212 OFFICE O/P EST SF 10 MIN: CPT | Mod: 25

## 2023-05-25 PROCEDURE — ? BIOPSY BY SHAVE METHOD

## 2023-05-25 PROCEDURE — 11102 TANGNTL BX SKIN SINGLE LES: CPT

## 2023-05-25 ASSESSMENT — LOCATION SIMPLE DESCRIPTION DERM
LOCATION SIMPLE: RIGHT FOREARM
LOCATION SIMPLE: ABDOMEN
LOCATION SIMPLE: LEFT FOREARM

## 2023-05-25 ASSESSMENT — LOCATION DETAILED DESCRIPTION DERM
LOCATION DETAILED: RIGHT PROXIMAL DORSAL FOREARM
LOCATION DETAILED: LEFT PROXIMAL DORSAL FOREARM
LOCATION DETAILED: PERIUMBILICAL SKIN

## 2023-05-25 ASSESSMENT — LOCATION ZONE DERM
LOCATION ZONE: TRUNK
LOCATION ZONE: ARM

## 2023-06-27 ENCOUNTER — APPOINTMENT (RX ONLY)
Dept: URBAN - METROPOLITAN AREA CLINIC 4 | Facility: CLINIC | Age: 88
Setting detail: DERMATOLOGY
End: 2023-06-27

## 2023-06-27 PROBLEM — C44.519 BASAL CELL CARCINOMA OF SKIN OF OTHER PART OF TRUNK: Status: ACTIVE | Noted: 2023-06-27

## 2023-06-27 PROCEDURE — 17262 DSTRJ MAL LES T/A/L 1.1-2.0: CPT

## 2023-06-27 PROCEDURE — ? COUNSELING

## 2023-06-27 PROCEDURE — 17262 DSTRJ MAL LES T/A/L 1.1-2.0: CPT | Mod: 76

## 2023-06-27 PROCEDURE — ? CURETTAGE AND DESTRUCTION

## 2023-06-27 NOTE — PROCEDURE: CURETTAGE AND DESTRUCTION
Detail Level: Detailed
Accession # (Optional): [id number]
Number Of Curettages: 3
Size Of Lesion In Cm: 1
Size Of Lesion After Curettage: 1.3
Add Intralesional Injection: No
Anesthesia Type: 1% lidocaine without epinephrine
Cautery Type: electrodesiccation
What Was Performed First?: Destruction
Final Size Statement: The size of the lesion after curettage was
Additional Information: (Optional): The wound was cleaned, and a pressure dressing was applied.  The patient received detailed post-op instructions.
Consent was obtained from the patient. The risks, benefits and alternatives to therapy were discussed in detail. Specifically, the risks of infection, scarring, bleeding, prolonged wound healing, nerve injury, incomplete removal, allergy to anesthesia and recurrence were addressed. Alternatives to ED&C, such as: surgical removal and XRT were also discussed.  Prior to the procedure, the treatment site was clearly identified and confirmed by the patient. All components of Universal Protocol/PAUSE Rule completed.
Post-Care Instructions: I reviewed with the patient in detail post-care instructions. Patient is to keep the area dry for 48 hours, and not to engage in any swimming until the area is healed. Should the patient develop any fevers, chills, bleeding, severe pain patient will contact the office immediately.
Bill As A Line Item Or As Units: Line Item
Size Of Lesion In Cm: 1.2
Size Of Lesion After Curettage: 1.5

## 2023-10-26 ENCOUNTER — APPOINTMENT (RX ONLY)
Dept: URBAN - METROPOLITAN AREA CLINIC 4 | Facility: CLINIC | Age: 88
Setting detail: DERMATOLOGY
End: 2023-10-26

## 2023-10-26 DIAGNOSIS — L57.0 ACTINIC KERATOSIS: ICD-10-CM

## 2023-10-26 DIAGNOSIS — D18.0 HEMANGIOMA: ICD-10-CM

## 2023-10-26 DIAGNOSIS — L82.1 OTHER SEBORRHEIC KERATOSIS: ICD-10-CM

## 2023-10-26 DIAGNOSIS — Z85.828 PERSONAL HISTORY OF OTHER MALIGNANT NEOPLASM OF SKIN: ICD-10-CM

## 2023-10-26 DIAGNOSIS — B00.1 HERPESVIRAL VESICULAR DERMATITIS: ICD-10-CM

## 2023-10-26 PROBLEM — D48.5 NEOPLASM OF UNCERTAIN BEHAVIOR OF SKIN: Status: ACTIVE | Noted: 2023-10-26

## 2023-10-26 PROBLEM — D18.01 HEMANGIOMA OF SKIN AND SUBCUTANEOUS TISSUE: Status: ACTIVE | Noted: 2023-10-26

## 2023-10-26 PROCEDURE — 17003 DESTRUCT PREMALG LES 2-14: CPT

## 2023-10-26 PROCEDURE — ? OBSERVATION

## 2023-10-26 PROCEDURE — ? ORDER TESTS

## 2023-10-26 PROCEDURE — 17000 DESTRUCT PREMALG LESION: CPT

## 2023-10-26 PROCEDURE — 99213 OFFICE O/P EST LOW 20 MIN: CPT | Mod: 25

## 2023-10-26 PROCEDURE — ? DEFER

## 2023-10-26 PROCEDURE — ? MEDICATION COUNSELING

## 2023-10-26 PROCEDURE — ? ADDITIONAL NOTES

## 2023-10-26 PROCEDURE — ? LIQUID NITROGEN

## 2023-10-26 PROCEDURE — ? COUNSELING

## 2023-10-26 PROCEDURE — ? PRESCRIPTION

## 2023-10-26 RX ORDER — VALACYCLOVIR HYDROCHLORIDE 500 MG/1
1 TABLET, FILM COATED ORAL BID
Qty: 30 | Refills: 2 | Status: CANCELLED

## 2023-10-26 RX ORDER — IMIQUIMOD 12.5 MG/.25G
1 CREAM TOPICAL QPM
Qty: 12 | Refills: 2 | Status: ERX | COMMUNITY
Start: 2023-10-26

## 2023-10-26 RX ADMIN — IMIQUIMOD 1: 12.5 CREAM TOPICAL at 00:00

## 2023-10-26 ASSESSMENT — LOCATION SIMPLE DESCRIPTION DERM
LOCATION SIMPLE: LEFT HAND
LOCATION SIMPLE: RIGHT FOREARM
LOCATION SIMPLE: LEFT LOWER BACK
LOCATION SIMPLE: LEFT FOREARM
LOCATION SIMPLE: LEFT INDEX FINGER
LOCATION SIMPLE: RIGHT UPPER BACK
LOCATION SIMPLE: POSTERIOR NECK
LOCATION SIMPLE: LEFT LIP

## 2023-10-26 ASSESSMENT — LOCATION DETAILED DESCRIPTION DERM
LOCATION DETAILED: RIGHT MEDIAL UPPER BACK
LOCATION DETAILED: LEFT PROXIMAL DORSAL FOREARM
LOCATION DETAILED: LEFT SUPERIOR MEDIAL LOWER BACK
LOCATION DETAILED: LEFT SUPERIOR POSTERIOR NECK
LOCATION DETAILED: RIGHT PROXIMAL DORSAL FOREARM
LOCATION DETAILED: LEFT PROXIMAL DORSAL INDEX FINGER
LOCATION DETAILED: LEFT RADIAL DORSAL HAND
LOCATION DETAILED: LEFT INFERIOR MEDIAL LOWER BACK
LOCATION DETAILED: LEFT UPPER CUTANEOUS LIP

## 2023-10-26 ASSESSMENT — LOCATION ZONE DERM
LOCATION ZONE: FINGER
LOCATION ZONE: NECK
LOCATION ZONE: TRUNK
LOCATION ZONE: ARM
LOCATION ZONE: LIP
LOCATION ZONE: HAND

## 2023-10-26 NOTE — PROCEDURE: ORDER TESTS
Performing Laboratory: [id number]
Bill For Surgical Tray: no
Expected Date Of Service: 10/26/2023
Billing Type: Third-Party Bill

## 2023-10-26 NOTE — PROCEDURE: ADDITIONAL NOTES
Detail Level: Simple
Additional Notes: Will recheck at next visit
Render Risk Assessment In Note?: no

## 2023-10-26 NOTE — PROCEDURE: MEDICATION COUNSELING
Oxybutynin Pregnancy And Lactation Text: This medication is Pregnancy Category B and is considered safe during pregnancy. It is unknown if it is excreted in breast milk.
Xolair Counseling:  Patient informed of potential adverse effects including but not limited to fever, muscle aches, rash and allergic reactions.  The patient verbalized understanding of the proper use and possible adverse effects of Xolair.  All of the patient's questions and concerns were addressed.
Bexarotene Counseling:  I discussed with the patient the risks of bexarotene including but not limited to hair loss, dry lips/skin/eyes, liver abnormalities, hyperlipidemia, pancreatitis, depression/suicidal ideation, photosensitivity, drug rash/allergic reactions, hypothyroidism, anemia, leukopenia, infection, cataracts, and teratogenicity.  Patient understands that they will need regular blood tests to check lipid profile, liver function tests, white blood cell count, thyroid function tests and pregnancy test if applicable.
Doxycycline Counseling:  Patient counseled regarding possible photosensitivity and increased risk for sunburn.  Patient instructed to avoid sunlight, if possible.  When exposed to sunlight, patients should wear protective clothing, sunglasses, and sunscreen.  The patient was instructed to call the office immediately if the following severe adverse effects occur:  hearing changes, easy bruising/bleeding, severe headache, or vision changes.  The patient verbalized understanding of the proper use and possible adverse effects of doxycycline.  All of the patient's questions and concerns were addressed.
Cimzia Counseling:  I discussed with the patient the risks of Cimzia including but not limited to immunosuppression, allergic reactions and infections.  The patient understands that monitoring is required including a PPD at baseline and must alert us or the primary physician if symptoms of infection or other concerning signs are noted.
Azelaic Acid Pregnancy And Lactation Text: This medication is considered safe during pregnancy and breast feeding.
Topical Steroids Counseling: I discussed with the patient that prolonged use of topical steroids can result in the increased appearance of superficial blood vessels (telangiectasias), lightening (hypopigmentation) and thinning of the skin (atrophy).  Patient understands to avoid using high potency steroids in skin folds, the groin or the face.  The patient verbalized understanding of the proper use and possible adverse effects of topical steroids.  All of the patient's questions and concerns were addressed.
Klisyri Counseling:  I discussed with the patient the risks of Klisyri including but not limited to erythema, scaling, itching, weeping, crusting, and pain.
Itraconazole Pregnancy And Lactation Text: This medication is Pregnancy Category C and it isn't know if it is safe during pregnancy. It is also excreted in breast milk.
Glycopyrrolate Pregnancy And Lactation Text: This medication is Pregnancy Category B and is considered safe during pregnancy. It is unknown if it is excreted breast milk.
Simponi Pregnancy And Lactation Text: The risk during pregnancy and breastfeeding is uncertain with this medication.
Topical Retinoid Pregnancy And Lactation Text: This medication is Pregnancy Category C. It is unknown if this medication is excreted in breast milk.
Dutasteride Pregnancy And Lactation Text: This medication is absolutely contraindicated in women, especially during pregnancy and breast feeding. Feminization of male fetuses is possible if taking while pregnant.
Valtrex Pregnancy And Lactation Text: this medication is Pregnancy Category B and is considered safe during pregnancy. This medication is not directly found in breast milk but it's metabolite acyclovir is present.
Ilumya Counseling: I discussed with the patient the risks of tildrakizumab including but not limited to immunosuppression, malignancy, posterior leukoencephalopathy syndrome, and serious infections.  The patient understands that monitoring is required including a PPD at baseline and must alert us or the primary physician if symptoms of infection or other concerning signs are noted.
Hydroxyzine Counseling: Patient advised that the medication is sedating and not to drive a car after taking this medication.  Patient informed of potential adverse effects including but not limited to dry mouth, urinary retention, and blurry vision.  The patient verbalized understanding of the proper use and possible adverse effects of hydroxyzine.  All of the patient's questions and concerns were addressed.
Rifampin Counseling: I discussed with the patient the risks of rifampin including but not limited to liver damage, kidney damage, red-orange body fluids, nausea/vomiting and severe allergy.
Zoryve Counseling:  I discussed with the patient that Zoryve is not for use in the eyes, mouth or vagina. The most commonly reported side effects include diarrhea, headache, insomnia, application site pain, upper respiratory tract infections, and urinary tract infections.  All of the patient's questions and concerns were addressed.
Clofazimine Counseling:  I discussed with the patient the risks of clofazimine including but not limited to skin and eye pigmentation, liver damage, nausea/vomiting, gastrointestinal bleeding and allergy.
Topical Steroids Applications Pregnancy And Lactation Text: Most topical steroids are considered safe to use during pregnancy and lactation.  Any topical steroid applied to the breast or nipple should be washed off before breastfeeding.
Bexarotene Pregnancy And Lactation Text: This medication is Pregnancy Category X and should not be given to women who are pregnant or may become pregnant. This medication should not be used if you are breast feeding.
Propranolol Counseling:  I discussed with the patient the risks of propranolol including but not limited to low heart rate, low blood pressure, low blood sugar, restlessness and increased cold sensitivity. They should call the office if they experience any of these side effects.
Xolair Pregnancy And Lactation Text: This medication is Pregnancy Category B and is considered safe during pregnancy. This medication is excreted in breast milk.
Qbrexza Counseling:  I discussed with the patient the risks of Qbrexza including but not limited to headache, mydriasis, blurred vision, dry eyes, nasal dryness, dry mouth, dry throat, dry skin, urinary hesitation, and constipation.  Local skin reactions including erythema, burning, stinging, and itching can also occur.
Doxycycline Pregnancy And Lactation Text: This medication is Pregnancy Category D and not consider safe during pregnancy. It is also excreted in breast milk but is considered safe for shorter treatment courses.
Methotrexate Pregnancy And Lactation Text: This medication is Pregnancy Category X and is known to cause fetal harm. This medication is excreted in breast milk.
Rinvoq Counseling: I discussed with the patient the risks of Rinvoq therapy including but not limited to upper respiratory tract infections, shingles, cold sores, bronchitis, nausea, cough, fever, acne, and headache. Live vaccines should be avoided.  This medication has been linked to serious infections; higher rate of mortality; malignancy and lymphoproliferative disorders; major adverse cardiovascular events; thrombosis; thrombocytopenia, anemia, and neutropenia; lipid elevations; liver enzyme elevations; and gastrointestinal perforations.
Minoxidil Pregnancy And Lactation Text: This medication has not been assigned a Pregnancy Risk Category but animal studies failed to show danger with the topical medication. It is unknown if the medication is excreted in breast milk.
Benzoyl Peroxide Counseling: Patient counseled that medicine may cause skin irritation and bleach clothing.  In the event of skin irritation, the patient was advised to reduce the amount of the drug applied or use it less frequently.   The patient verbalized understanding of the proper use and possible adverse effects of benzoyl peroxide.  All of the patient's questions and concerns were addressed.
Cimzia Pregnancy And Lactation Text: This medication crosses the placenta but can be considered safe in certain situations. Cimzia may be excreted in breast milk.
Tazorac Counseling:  Patient advised that medication is irritating and drying.  Patient may need to apply sparingly and wash off after an hour before eventually leaving it on overnight.  The patient verbalized understanding of the proper use and possible adverse effects of tazorac.  All of the patient's questions and concerns were addressed.
Azathioprine Counseling:  I discussed with the patient the risks of azathioprine including but not limited to myelosuppression, immunosuppression, hepatotoxicity, lymphoma, and infections.  The patient understands that monitoring is required including baseline LFTs, Creatinine, possible TPMP genotyping and weekly CBCs for the first month and then every 2 weeks thereafter.  The patient verbalized understanding of the proper use and possible adverse effects of azathioprine.  All of the patient's questions and concerns were addressed.
Hydroxyzine Pregnancy And Lactation Text: This medication is not safe during pregnancy and should not be taken. It is also excreted in breast milk and breast feeding isn't recommended.
Finasteride Male Counseling: Finasteride Counseling:  I discussed with the patient the risks of use of finasteride including but not limited to decreased libido, decreased ejaculate volume, gynecomastia, and depression. Women should not handle medication.  All of the patient's questions and concerns were addressed.
Ketoconazole Counseling:   Patient counseled regarding improving absorption with orange juice.  Adverse effects include but are not limited to breast enlargement, headache, diarrhea, nausea, upset stomach, liver function test abnormalities, taste disturbance, and stomach pain.  There is a rare possibility of liver failure that can occur when taking ketoconazole. The patient understands that monitoring of LFTs may be required, especially at baseline. The patient verbalized understanding of the proper use and possible adverse effects of ketoconazole.  All of the patient's questions and concerns were addressed.
Rifampin Pregnancy And Lactation Text: This medication is Pregnancy Category C and it isn't know if it is safe during pregnancy. It is also excreted in breast milk and should not be used if you are breast feeding.
Use Enhanced Medication Counseling?: No
Hydroxychloroquine Counseling:  I discussed with the patient that a baseline ophthalmologic exam is needed at the start of therapy and every year thereafter while on therapy. A CBC may also be warranted for monitoring.  The side effects of this medication were discussed with the patient, including but not limited to agranulocytosis, aplastic anemia, seizures, rashes, retinopathy, and liver toxicity. Patient instructed to call the office should any adverse effect occur.  The patient verbalized understanding of the proper use and possible adverse effects of Plaquenil.  All the patient's questions and concerns were addressed.
Azithromycin Counseling:  I discussed with the patient the risks of azithromycin including but not limited to GI upset, allergic reaction, drug rash, diarrhea, and yeast infections.
Skyrizi Counseling: I discussed with the patient the risks of risankizumab-rzaa including but not limited to immunosuppression, and serious infections.  The patient understands that monitoring is required including a PPD at baseline and must alert us or the primary physician if symptoms of infection or other concerning signs are noted.
Klisyri Pregnancy And Lactation Text: It is unknown if this medication can harm a developing fetus or if it is excreted in breast milk.
Zoryve Pregnancy And Lactation Text: It is unknown if this medication can cause problems during pregnancy and breastfeeding.
Qbrexza Pregnancy And Lactation Text: There is no available data on Qbrexza use in pregnant women.  There is no available data on Qbrexza use in lactation.
Clofazimine Pregnancy And Lactation Text: This medication is Pregnancy Category C and isn't considered safe during pregnancy. It is excreted in breast milk.
Rinvoq Pregnancy And Lactation Text: Based on animal studies, Rinvoq may cause embryo-fetal harm when administered to pregnant women.  The medication should not be used in pregnancy.  Breastfeeding is not recommended during treatment and for 6 days after the last dose.
Erythromycin Counseling:  I discussed with the patient the risks of erythromycin including but not limited to GI upset, allergic reaction, drug rash, diarrhea, increase in liver enzymes, and yeast infections.
Isotretinoin Counseling: Patient should get monthly blood tests, not donate blood, not drive at night if vision affected, not share medication, and not undergo elective surgery for 6 months after tx completed. Side effects reviewed, pt to contact office should one occur.
Topical Sulfur Applications Counseling: Topical Sulfur Counseling: Patient counseled that this medication may cause skin irritation or allergic reactions.  In the event of skin irritation, the patient was advised to reduce the amount of the drug applied or use it less frequently.   The patient verbalized understanding of the proper use and possible adverse effects of topical sulfur application.  All of the patient's questions and concerns were addressed.
Elidel Counseling: Patient may experience a mild burning sensation during topical application. Elidel is not approved in children less than 2 years of age. There have been case reports of hematologic and skin malignancies in patients using topical calcineurin inhibitors although causality is questionable.
Prednisone Counseling:  I discussed with the patient the risks of prolonged use of prednisone including but not limited to weight gain, insomnia, osteoporosis, mood changes, diabetes, susceptibility to infection, glaucoma and high blood pressure.  In cases where prednisone use is prolonged, patients should be monitored with blood pressure checks, serum glucose levels and an eye exam.  Additionally, the patient may need to be placed on GI prophylaxis, PCP prophylaxis, and calcium and vitamin D supplementation and/or a bisphosphonate.  The patient verbalized understanding of the proper use and the possible adverse effects of prednisone.  All of the patient's questions and concerns were addressed.
Cosentyx Counseling:  I discussed with the patient the risks of Cosentyx including but not limited to worsening of Crohn's disease, immunosuppression, allergic reactions and infections.  The patient understands that monitoring is required including a PPD at baseline and must alert us or the primary physician if symptoms of infection or other concerning signs are noted.
Benzoyl Peroxide Pregnancy And Lactation Text: This medication is Pregnancy Category C. It is unknown if benzoyl peroxide is excreted in breast milk.
Azathioprine Pregnancy And Lactation Text: This medication is Pregnancy Category D and isn't considered safe during pregnancy. It is unknown if this medication is excreted in breast milk.
Mirvaso Counseling: Mirvaso is a topical medication which can decrease superficial blood flow where applied. Side effects are uncommon and include stinging, redness and allergic reactions.
Propranolol Pregnancy And Lactation Text: This medication is Pregnancy Category C and it isn't known if it is safe during pregnancy. It is excreted in breast milk.
Olanzapine Counseling- I discussed with the patient the common side effects of olanzapine including but are not limited to: lack of energy, dry mouth, increased appetite, sleepiness, tremor, constipation, dizziness, changes in behavior, or restlessness.  Explained that teenagers are more likely to experience headaches, abdominal pain, pain in the arms or legs, tiredness, and sleepiness.  Serious side effects include but are not limited: increased risk of death in elderly patients who are confused, have memory loss, or dementia-related psychosis; hyperglycemia; increased cholesterol and triglycerides; and weight gain.
Sarecycline Counseling: Patient advised regarding possible photosensitivity and discoloration of the teeth, skin, lips, tongue and gums.  Patient instructed to avoid sunlight, if possible.  When exposed to sunlight, patients should wear protective clothing, sunglasses, and sunscreen.  The patient was instructed to call the office immediately if the following severe adverse effects occur:  hearing changes, easy bruising/bleeding, severe headache, or vision changes.  The patient verbalized understanding of the proper use and possible adverse effects of sarecycline.  All of the patient's questions and concerns were addressed.
Tazorac Pregnancy And Lactation Text: This medication is not safe during pregnancy. It is unknown if this medication is excreted in breast milk.
Zyclara Counseling:  I discussed with the patient the risks of imiquimod including but not limited to erythema, scaling, itching, weeping, crusting, and pain.  Patient understands that the inflammatory response to imiquimod is variable from person to person and was educated regarded proper titration schedule.  If flu-like symptoms develop, patient knows to discontinue the medication and contact us.
Erivedge Counseling- I discussed with the patient the risks of Erivedge including but not limited to nausea, vomiting, diarrhea, constipation, weight loss, changes in the sense of taste, decreased appetite, muscle spasms, and hair loss.  The patient verbalized understanding of the proper use and possible adverse effects of Erivedge.  All of the patient's questions and concerns were addressed.
Minoxidil Counseling: Minoxidil is a topical medication which can increase blood flow where it is applied. It is uncertain how this medication increases hair growth. Side effects are uncommon and include stinging and allergic reactions.
Azithromycin Pregnancy And Lactation Text: This medication is considered safe during pregnancy and is also secreted in breast milk.
Hydroxychloroquine Pregnancy And Lactation Text: This medication has been shown to cause fetal harm but it isn't assigned a Pregnancy Risk Category. There are small amounts excreted in breast milk.
Ketoconazole Pregnancy And Lactation Text: This medication is Pregnancy Category C and it isn't know if it is safe during pregnancy. It is also excreted in breast milk and breast feeding isn't recommended.
Prednisone Pregnancy And Lactation Text: This medication is Pregnancy Category C and it isn't know if it is safe during pregnancy. This medication is excreted in breast milk.
Colchicine Counseling:  Patient counseled regarding adverse effects including but not limited to stomach upset (nausea, vomiting, stomach pain, or diarrhea).  Patient instructed to limit alcohol consumption while taking this medication.  Colchicine may reduce blood counts especially with prolonged use.  The patient understands that monitoring of kidney function and blood counts may be required, especially at baseline. The patient verbalized understanding of the proper use and possible adverse effects of colchicine.  All of the patient's questions and concerns were addressed.
Sotyktu Counseling:  I discussed the most common side effects of Sotyktu including: common cold, sore throat, sinus infections, cold sores, canker sores, folliculitis, and acne.  I also discussed more serious side effects of Sotyktu including but not limited to: serious allergic reactions; increased risk for infections such as TB; cancers such as lymphomas; rhabdomyolysis and elevated CPK; and elevated triglycerides and liver enzymes. 
Rhofade Counseling: Rhofade is a topical medication which can decrease superficial blood flow where applied. Side effects are uncommon and include stinging, redness and allergic reactions.
Albendazole Counseling:  I discussed with the patient the risks of albendazole including but not limited to cytopenia, kidney damage, nausea/vomiting and severe allergy.  The patient understands that this medication is being used in an off-label manner.
Cosentyx Pregnancy And Lactation Text: This medication is Pregnancy Category B and is considered safe during pregnancy. It is unknown if this medication is excreted in breast milk.
SSKI Counseling:  I discussed with the patient the risks of SSKI including but not limited to thyroid abnormalities, metallic taste, GI upset, fever, headache, acne, arthralgias, paraesthesias, lymphadenopathy, easy bleeding, arrhythmias, and allergic reaction.
Isotretinoin Pregnancy And Lactation Text: This medication is Pregnancy Category X and is considered extremely dangerous during pregnancy. It is unknown if it is excreted in breast milk.
Erythromycin Pregnancy And Lactation Text: This medication is Pregnancy Category B and is considered safe during pregnancy. It is also excreted in breast milk.
Infliximab Counseling:  I discussed with the patient the risks of infliximab including but not limited to myelosuppression, immunosuppression, autoimmune hepatitis, demyelinating diseases, lymphoma, and serious infections.  The patient understands that monitoring is required including a PPD at baseline and must alert us or the primary physician if symptoms of infection or other concerning signs are noted.
Topical Sulfur Applications Pregnancy And Lactation Text: This medication is considered safe during pregnancy and breast feeding secondary to limited systemic absorption.
Bactrim Counseling:  I discussed with the patient the risks of sulfa antibiotics including but not limited to GI upset, allergic reaction, drug rash, diarrhea, dizziness, photosensitivity, and yeast infections.  Rarely, more serious reactions can occur including but not limited to aplastic anemia, agranulocytosis, methemoglobinemia, blood dyscrasias, liver or kidney failure, lung infiltrates or desquamative/blistering drug rashes.
Low Dose Naltrexone Counseling- I discussed with the patient the potential risks and side effects of low dose naltrexone including but not limited to: more vivid dreams, headaches, nausea, vomiting, abdominal pain, fatigue, dizziness, and anxiety.
Olanzapine Pregnancy And Lactation Text: This medication is pregnancy category C.   There are no adequate and well controlled trials with olanzapine in pregnant females.  Olanzapine should be used during pregnancy only if the potential benefit justifies the potential risk to the fetus.   In a study in lactating healthy women, olanzapine was excreted in breast milk.  It is recommended that women taking olanzapine should not breast feed.
Stelara Counseling:  I discussed with the patient the risks of ustekinumab including but not limited to immunosuppression, malignancy, posterior leukoencephalopathy syndrome, and serious infections.  The patient understands that monitoring is required including a PPD at baseline and must alert us or the primary physician if symptoms of infection or other concerning signs are noted.
Mirvaso Pregnancy And Lactation Text: This medication has not been assigned a Pregnancy Risk Category. It is unknown if the medication is excreted in breast milk.
Terbinafine Counseling: Patient counseling regarding adverse effects of terbinafine including but not limited to headache, diarrhea, rash, upset stomach, liver function test abnormalities, itching, taste/smell disturbance, nausea, abdominal pain, and flatulence.  There is a rare possibility of liver failure that can occur when taking terbinafine.  The patient understands that a baseline LFT and kidney function test may be required. The patient verbalized understanding of the proper use and possible adverse effects of terbinafine.  All of the patient's questions and concerns were addressed.
Cellcept Counseling:  I discussed with the patient the risks of mycophenolate mofetil including but not limited to infection/immunosuppression, GI upset, hypokalemia, hypercholesterolemia, bone marrow suppression, lymphoproliferative disorders, malignancy, GI ulceration/bleed/perforation, colitis, interstitial lung disease, kidney failure, progressive multifocal leukoencephalopathy, and birth defects.  The patient understands that monitoring is required including a baseline creatinine and regular CBC testing. In addition, patient must alert us immediately if symptoms of infection or other concerning signs are noted.
Topical Clindamycin Counseling: Patient counseled that this medication may cause skin irritation or allergic reactions.  In the event of skin irritation, the patient was advised to reduce the amount of the drug applied or use it less frequently.   The patient verbalized understanding of the proper use and possible adverse effects of clindamycin.  All of the patient's questions and concerns were addressed.
Carac Counseling:  I discussed with the patient the risks of Carac including but not limited to erythema, scaling, itching, weeping, crusting, and pain.
Erivedge Pregnancy And Lactation Text: This medication is Pregnancy Category X and is absolutely contraindicated during pregnancy. It is unknown if it is excreted in breast milk.
Sarecycline Pregnancy And Lactation Text: This medication is Pregnancy Category D and not consider safe during pregnancy. It is also excreted in breast milk.
Wartpeel Counseling:  I discussed with the patient the risks of Wartpeel including but not limited to erythema, scaling, itching, weeping, crusting, and pain.
Sski Pregnancy And Lactation Text: This medication is Pregnancy Category D and isn't considered safe during pregnancy. It is excreted in breast milk.
Metronidazole Counseling:  I discussed with the patient the risks of metronidazole including but not limited to seizures, nausea/vomiting, a metallic taste in the mouth, nausea/vomiting and severe allergy.
Sotyktu Pregnancy And Lactation Text: There is insufficient data to evaluate whether or not Sotyktu is safe to use during pregnancy.   It is not known if Sotyktu passes into breast milk and whether or not it is safe to use when breastfeeding.  
Albendazole Pregnancy And Lactation Text: This medication is Pregnancy Category C and it isn't known if it is safe during pregnancy. It is also excreted in breast milk.
Eucrisa Counseling: Patient may experience a mild burning sensation during topical application. Eucrisa is not approved in children less than 3 months of age.
Cibinqo Counseling: I discussed with the patient the risks of Cibinqo therapy including but not limited to common cold, nausea, headache, cold sores, increased blood CPK levels, dizziness, UTIs, fatigue, acne, and vomitting. Live vaccines should be avoided.  This medication has been linked to serious infections; higher rate of mortality; malignancy and lymphoproliferative disorders; major adverse cardiovascular events; thrombosis; thrombocytopenia and lymphopenia; lipid elevations; and retinal detachment.
Opzelura Counseling:  I discussed with the patient the risks of Opzelura including but not limited to nasopharngitis, bronchitis, ear infection, eosinophila, hives, diarrhea, folliculitis, tonsillitis, and rhinorrhea.  Taken orally, this medication has been linked to serious infections; higher rate of mortality; malignancy and lymphoproliferative disorders; major adverse cardiovascular events; thrombosis; thrombocytopenia, anemia, and neutropenia; and lipid elevations.
Carac Pregnancy And Lactation Text: This medication is Pregnancy Category X and contraindicated in pregnancy and in women who may become pregnant. It is unknown if this medication is excreted in breast milk.
Dupixent Counseling: I discussed with the patient the risks of dupilumab including but not limited to eye infection and irritation, cold sores, injection site reactions, worsening of asthma, allergic reactions and increased risk of parasitic infection.  Live vaccines should be avoided while taking dupilumab. Dupilumab will also interact with certain medications such as warfarin and cyclosporine. The patient understands that monitoring is required and they must alert us or the primary physician if symptoms of infection or other concerning signs are noted.
High Dose Vitamin A Counseling: Side effects reviewed, pt to contact office should one occur.
Libtayo Counseling- I discussed with the patient the risks of Libtayo including but not limited to nausea, vomiting, diarrhea, and bone or muscle pain.  The patient verbalized understanding of the proper use and possible adverse effects of Libtayo.  All of the patient's questions and concerns were addressed.
Tetracycline Counseling: Patient counseled regarding possible photosensitivity and increased risk for sunburn.  Patient instructed to avoid sunlight, if possible.  When exposed to sunlight, patients should wear protective clothing, sunglasses, and sunscreen.  The patient was instructed to call the office immediately if the following severe adverse effects occur:  hearing changes, easy bruising/bleeding, severe headache, or vision changes.  The patient verbalized understanding of the proper use and possible adverse effects of tetracycline.  All of the patient's questions and concerns were addressed. Patient understands to avoid pregnancy while on therapy due to potential birth defects.
Terbinafine Pregnancy And Lactation Text: This medication is Pregnancy Category B and is considered safe during pregnancy. It is also excreted in breast milk and breast feeding isn't recommended.
Low Dose Naltrexone Pregnancy And Lactation Text: Naltrexone is pregnancy category C.  There have been no adequate and well-controlled studies in pregnant women.  It should be used in pregnancy only if the potential benefit justifies the potential risk to the fetus.   Limited data indicates that naltrexone is minimally excreted into breastmilk.
Bactrim Pregnancy And Lactation Text: This medication is Pregnancy Category D and is known to cause fetal risk.  It is also excreted in breast milk.
Oral Minoxidil Counseling- I discussed with the patient the risks of oral minoxidil including but not limited to shortness of breath, swelling of the feet or ankles, dizziness, lightheadedness, unwanted hair growth and allergic reaction.  The patient verbalized understanding of the proper use and possible adverse effects of oral minoxidil.  All of the patient's questions and concerns were addressed.
Solaraze Counseling:  I discussed with the patient the risks of Solaraze including but not limited to erythema, scaling, itching, weeping, crusting, and pain.
Metronidazole Pregnancy And Lactation Text: This medication is Pregnancy Category B and considered safe during pregnancy.  It is also excreted in breast milk.
Rituxan Counseling:  I discussed with the patient the risks of Rituxan infusions. Side effects can include infusion reactions, severe drug rashes including mucocutaneous reactions, reactivation of latent hepatitis and other infections and rarely progressive multifocal leukoencephalopathy.  All of the patient's questions and concerns were addressed.
Dapsone Counseling: I discussed with the patient the risks of dapsone including but not limited to hemolytic anemia, agranulocytosis, rashes, methemoglobinemia, kidney failure, peripheral neuropathy, headaches, GI upset, and liver toxicity.  Patients who start dapsone require monitoring including baseline LFTs and weekly CBCs for the first month, then every month thereafter.  The patient verbalized understanding of the proper use and possible adverse effects of dapsone.  All of the patient's questions and concerns were addressed.
Fluconazole Counseling:  Patient counseled regarding adverse effects of fluconazole including but not limited to headache, diarrhea, nausea, upset stomach, liver function test abnormalities, taste disturbance, and stomach pain.  There is a rare possibility of liver failure that can occur when taking fluconazole.  The patient understands that monitoring of LFTs and kidney function test may be required, especially at baseline. The patient verbalized understanding of the proper use and possible adverse effects of fluconazole.  All of the patient's questions and concerns were addressed.
Cyclophosphamide Counseling:  I discussed with the patient the risks of cyclophosphamide including but not limited to hair loss, hormonal abnormalities, decreased fertility, abdominal pain, diarrhea, nausea and vomiting, bone marrow suppression and infection. The patient understands that monitoring is required while taking this medication.
Calcipotriene Counseling:  I discussed with the patient the risks of calcipotriene including but not limited to erythema, scaling, itching, and irritation.
High Dose Vitamin A Pregnancy And Lactation Text: High dose vitamin A therapy is contraindicated during pregnancy and breast feeding.
Opioid Counseling: I discussed with the patient the potential side effects of opioids including but not limited to addiction, altered mental status, and depression. I stressed avoiding alcohol, benzodiazepines, muscle relaxants and sleep aids unless specifically okayed by a physician. The patient verbalized understanding of the proper use and possible adverse effects of opioids. All of the patient's questions and concerns were addressed. They were instructed to flush the remaining pills down the toilet if they did not need them for pain.
Cibinqo Pregnancy And Lactation Text: It is unknown if this medication will adversely affect pregnancy or breast feeding.  You should not take this medication if you are currently pregnant or planning a pregnancy or while breastfeeding.
Ivermectin Counseling:  Patient instructed to take medication on an empty stomach with a full glass of water.  Patient informed of potential adverse effects including but not limited to nausea, diarrhea, dizziness, itching, and swelling of the extremities or lymph nodes.  The patient verbalized understanding of the proper use and possible adverse effects of ivermectin.  All of the patient's questions and concerns were addressed.
Thalidomide Counseling: I discussed with the patient the risks of thalidomide including but not limited to birth defects, anxiety, weakness, chest pain, dizziness, cough and severe allergy.
Opzelura Pregnancy And Lactation Text: There is insufficient data to evaluate drug-associated risk for major birth defects, miscarriage, or other adverse maternal or fetal outcomes.  There is a pregnancy registry that monitors pregnancy outcomes in pregnant persons exposed to the medication during pregnancy.  It is unknown if this medication is excreted in breast milk.  Do not breastfeed during treatment and for about 4 weeks after the last dose.
Xeljanz Counseling: I discussed with the patient the risks of Xeljanz therapy including increased risk of infection, liver issues, headache, diarrhea, or cold symptoms. Live vaccines should be avoided. They were instructed to call if they have any problems.
Finasteride Pregnancy And Lactation Text: This medication is absolutely contraindicated during pregnancy. It is unknown if it is excreted in breast milk.
Taltz Counseling: I discussed with the patient the risks of ixekizumab including but not limited to immunosuppression, serious infections, worsening of inflammatory bowel disease and drug reactions.  The patient understands that monitoring is required including a PPD at baseline and must alert us or the primary physician if symptoms of infection or other concerning signs are noted.
Oral Minoxidil Pregnancy And Lactation Text: This medication should only be used when clearly needed if you are pregnant, attempting to become pregnant or breast feeding.
Topical Ketoconazole Counseling: Patient counseled that this medication may cause skin irritation or allergic reactions.  In the event of skin irritation, the patient was advised to reduce the amount of the drug applied or use it less frequently.   The patient verbalized understanding of the proper use and possible adverse effects of ketoconazole.  All of the patient's questions and concerns were addressed.
Libtayo Pregnancy And Lactation Text: This medication is contraindicated in pregnancy and when breast feeding.
Dupixent Pregnancy And Lactation Text: This medication likely crosses the placenta but the risk for the fetus is uncertain. This medication is excreted in breast milk.
Niacinamide Counseling: I recommended taking niacin or niacinamide, also know as vitamin B3, twice daily. Recent evidence suggests that taking vitamin B3 (500 mg twice daily) can reduce the risk of actinic keratoses and non-melanoma skin cancers. Side effects of vitamin B3 include flushing and headache.
Cephalexin Counseling: I counseled the patient regarding use of cephalexin as an antibiotic for prophylactic and/or therapeutic purposes. Cephalexin (commonly prescribed under brand name Keflex) is a cephalosporin antibiotic which is active against numerous classes of bacteria, including most skin bacteria. Side effects may include nausea, diarrhea, gastrointestinal upset, rash, hives, yeast infections, and in rare cases, hepatitis, kidney disease, seizures, fever, confusion, neurologic symptoms, and others. Patients with severe allergies to penicillin medications are cautioned that there is about a 10% incidence of cross-reactivity with cephalosporins. When possible, patients with penicillin allergies should use alternatives to cephalosporins for antibiotic therapy.
Dapsone Pregnancy And Lactation Text: This medication is Pregnancy Category C and is not considered safe during pregnancy or breast feeding.
Calcipotriene Pregnancy And Lactation Text: The use of this medication during pregnancy or lactation is not recommended as there is insufficient data.
Solaraze Pregnancy And Lactation Text: This medication is Pregnancy Category B and is considered safe. There is some data to suggest avoiding during the third trimester. It is unknown if this medication is excreted in breast milk.
Xeljonathanz Pregnancy And Lactation Text: This medication is Pregnancy Category D and is not considered safe during pregnancy.  The risk during breast feeding is also uncertain.
Hydroquinone Counseling:  Patient advised that medication may result in skin irritation, lightening (hypopigmentation), dryness, and burning.  In the event of skin irritation, the patient was advised to reduce the amount of the drug applied or use it less frequently.  Rarely, spots that are treated with hydroquinone can become darker (pseudoochronosis).  Should this occur, patient instructed to stop medication and call the office. The patient verbalized understanding of the proper use and possible adverse effects of hydroquinone.  All of the patient's questions and concerns were addressed.
Cimetidine Counseling:  I discussed with the patient the risks of Cimetidine including but not limited to gynecomastia, headache, diarrhea, nausea, drowsiness, arrhythmias, pancreatitis, skin rashes, psychosis, bone marrow suppression and kidney toxicity.
Birth Control Pills Counseling: Birth Control Pill Counseling: I discussed with the patient the potential side effects of OCPs including but not limited to increased risk of stroke, heart attack, thrombophlebitis, deep venous thrombosis, hepatic adenomas, breast changes, GI upset, headaches, and depression.  The patient verbalized understanding of the proper use and possible adverse effects of OCPs. All of the patient's questions and concerns were addressed.
Winlevi Counseling:  I discussed with the patient the risks of topical clascoterone including but not limited to erythema, scaling, itching, and stinging. Patient voiced their understanding.
Rituxan Pregnancy And Lactation Text: This medication is Pregnancy Category C and it isn't know if it is safe during pregnancy. It is unknown if this medication is excreted in breast milk but similar antibodies are known to be excreted.
Minocycline Counseling: Patient advised regarding possible photosensitivity and discoloration of the teeth, skin, lips, tongue and gums.  Patient instructed to avoid sunlight, if possible.  When exposed to sunlight, patients should wear protective clothing, sunglasses, and sunscreen.  The patient was instructed to call the office immediately if the following severe adverse effects occur:  hearing changes, easy bruising/bleeding, severe headache, or vision changes.  The patient verbalized understanding of the proper use and possible adverse effects of minocycline.  All of the patient's questions and concerns were addressed.
Cephalexin Pregnancy And Lactation Text: This medication is Pregnancy Category B and considered safe during pregnancy.  It is also excreted in breast milk but can be used safely for shorter doses.
Niacinamide Pregnancy And Lactation Text: These medications are considered safe during pregnancy.
Otezla Counseling: The side effects of Otezla were discussed with the patient, including but not limited to worsening or new depression, weight loss, diarrhea, nausea, upper respiratory tract infection, and headache. Patient instructed to call the office should any adverse effect occur.  The patient verbalized understanding of the proper use and possible adverse effects of Otezla.  All the patient's questions and concerns were addressed.
Picato Counseling:  I discussed with the patient the risks of Picato including but not limited to erythema, scaling, itching, weeping, crusting, and pain.
Cyclophosphamide Pregnancy And Lactation Text: This medication is Pregnancy Category D and it isn't considered safe during pregnancy. This medication is excreted in breast milk.
Opioid Pregnancy And Lactation Text: These medications can lead to premature delivery and should be avoided during pregnancy. These medications are also present in breast milk in small amounts.
Cantharidin Counseling:  I discussed with the patient the risks of Cantharidin including but not limited to pain, redness, burning, itching, and blistering.
Enbrel Counseling:  I discussed with the patient the risks of etanercept including but not limited to myelosuppression, immunosuppression, autoimmune hepatitis, demyelinating diseases, lymphoma, and infections.  The patient understands that monitoring is required including a PPD at baseline and must alert us or the primary physician if symptoms of infection or other concerning signs are noted.
Cantharidin Pregnancy And Lactation Text: This medication has not been proven safe during pregnancy. It is unknown if this medication is excreted in breast milk.
Aklief counseling:  Patient advised to apply a pea-sized amount only at bedtime and wait 30 minutes after washing their face before applying.  If too drying, patient may add a non-comedogenic moisturizer.  The most commonly reported side effects including irritation, redness, scaling, dryness, stinging, burning, itching, and increased risk of sunburn.  The patient verbalized understanding of the proper use and possible adverse effects of retinoids.  All of the patient's questions and concerns were addressed.
Soolantra Counseling: I discussed with the patients the risks of topial Soolantra. This is a medicine which decreases the number of mites and inflammation in the skin. You experience burning, stinging, eye irritation or allergic reactions.  Please call our office if you develop any problems from using this medication.
Litfulo Counseling: I discussed with the patient the risks of Litfulo therapy including but not limited to upper respiratory tract infections, shingles, cold sores, and nausea. Live vaccines should be avoided.  This medication has been linked to serious infections; higher rate of mortality; malignancy and lymphoproliferative disorders; major adverse cardiovascular events; thrombosis; gastrointestinal perforations; neutropenia; lymphopenia; anemia; liver enzyme elevations; and lipid elevations.
Odomzo Counseling- I discussed with the patient the risks of Odomzo including but not limited to nausea, vomiting, diarrhea, constipation, weight loss, changes in the sense of taste, decreased appetite, muscle spasms, and hair loss.  The patient verbalized understanding of the proper use and possible adverse effects of Odomzo.  All of the patient's questions and concerns were addressed.
Griseofulvin Counseling:  I discussed with the patient the risks of griseofulvin including but not limited to photosensitivity, cytopenia, liver damage, nausea/vomiting and severe allergy.  The patient understands that this medication is best absorbed when taken with a fatty meal (e.g., ice cream or french fries).
Tranexamic Acid Counseling:  Patient advised of the small risk of bleeding problems with tranexamic acid. They were also instructed to call if they developed any nausea, vomiting or diarrhea. All of the patient's questions and concerns were addressed.
Gabapentin Counseling: I discussed with the patient the risks of gabapentin including but not limited to dizziness, somnolence, fatigue and ataxia.
Detail Level: Simple
Siliq Counseling:  I discussed with the patient the risks of Siliq including but not limited to new or worsening depression, suicidal thoughts and behavior, immunosuppression, malignancy, posterior leukoencephalopathy syndrome, and serious infections.  The patient understands that monitoring is required including a PPD at baseline and must alert us or the primary physician if symptoms of infection or other concerning signs are noted. There is also a special program designed to monitor depression which is required with Siliq.
Nsaids Counseling: NSAID Counseling: I discussed with the patient that NSAIDs should be taken with food. Prolonged use of NSAIDs can result in the development of stomach ulcers.  Patient advised to stop taking NSAIDs if abdominal pain occurs.  The patient verbalized understanding of the proper use and possible adverse effects of NSAIDs.  All of the patient's questions and concerns were addressed.
Birth Control Pills Pregnancy And Lactation Text: This medication should be avoided if pregnant and for the first 30 days post-partum.
Winlevi Pregnancy And Lactation Text: This medication is considered safe during pregnancy and breastfeeding.
Cyclosporine Counseling:  I discussed with the patient the risks of cyclosporine including but not limited to hypertension, gingival hyperplasia,myelosuppression, immunosuppression, liver damage, kidney damage, neurotoxicity, lymphoma, and serious infections. The patient understands that monitoring is required including baseline blood pressure, CBC, CMP, lipid panel and uric acid, and then 1-2 times monthly CMP and blood pressure.
Clindamycin Counseling: I counseled the patient regarding use of clindamycin as an antibiotic for prophylactic and/or therapeutic purposes. Clindamycin is active against numerous classes of bacteria, including skin bacteria. Side effects may include nausea, diarrhea, gastrointestinal upset, rash, hives, yeast infections, and in rare cases, colitis.
Topical Metronidazole Counseling: Metronidazole is a topical antibiotic medication. You may experience burning, stinging, redness, or allergic reactions.  Please call our office if you develop any problems from using this medication.
Acitretin Counseling:  I discussed with the patient the risks of acitretin including but not limited to hair loss, dry lips/skin/eyes, liver damage, hyperlipidemia, depression/suicidal ideation, photosensitivity.  Serious rare side effects can include but are not limited to pancreatitis, pseudotumor cerebri, bony changes, clot formation/stroke/heart attack.  Patient understands that alcohol is contraindicated since it can result in liver toxicity and significantly prolong the elimination of the drug by many years.
Aklief Pregnancy And Lactation Text: It is unknown if this medication is safe to use during pregnancy.  It is unknown if this medication is excreted in breast milk.  Breastfeeding women should use the topical cream on the smallest area of the skin for the shortest time needed while breastfeeding.  Do not apply to nipple and areola.
Otezla Pregnancy And Lactation Text: This medication is Pregnancy Category C and it isn't known if it is safe during pregnancy. It is unknown if it is excreted in breast milk.
Tremfya Counseling: I discussed with the patient the risks of guselkumab including but not limited to immunosuppression, serious infections, and drug reactions.  The patient understands that monitoring is required including a PPD at baseline and must alert us or the primary physician if symptoms of infection or other concerning signs are noted.
Litfulo Pregnancy And Lactation Text: Based on animal studies, Lifulo may cause embryo-fetal harm when administered to pregnant women.  The medication should not be used in pregnancy.  Breastfeeding is not recommended during treatment.
5-Fu Counseling: 5-Fluorouracil Counseling:  I discussed with the patient the risks of 5-fluorouracil including but not limited to erythema, scaling, itching, weeping, crusting, and pain.
Soolantra Pregnancy And Lactation Text: This medication is Pregnancy Category C. This medication is considered safe during breast feeding.
Adbry Counseling: I discussed with the patient the risks of tralokinumab including but not limited to eye infection and irritation, cold sores, injection site reactions, worsening of asthma, allergic reactions and increased risk of parasitic infection.  Live vaccines should be avoided while taking tralokinumab. The patient understands that monitoring is required and they must alert us or the primary physician if symptoms of infection or other concerning signs are noted.
Doxepin Counseling:  Patient advised that the medication is sedating and not to drive a car after taking this medication. Patient informed of potential adverse effects including but not limited to dry mouth, urinary retention, and blurry vision.  The patient verbalized understanding of the proper use and possible adverse effects of doxepin.  All of the patient's questions and concerns were addressed.
Griseofulvin Pregnancy And Lactation Text: This medication is Pregnancy Category X and is known to cause serious birth defects. It is unknown if this medication is excreted in breast milk but breast feeding should be avoided.
VTAMA Counseling: I discussed with the patient that VTAMA is not for use in the eyes, mouth or mouth. They should call the office if they develop any signs of allergic reactions to VTAMA. The patient verbalized understanding of the proper use and possible adverse effects of VTAMA.  All of the patient's questions and concerns were addressed.
Arava Counseling:  Patient counseled regarding adverse effects of Arava including but not limited to nausea, vomiting, abnormalities in liver function tests. Patients may develop mouth sores, rash, diarrhea, and abnormalities in blood counts. The patient understands that monitoring is required including LFTs and blood counts.  There is a rare possibility of scarring of the liver and lung problems that can occur when taking methotrexate. Persistent nausea, loss of appetite, pale stools, dark urine, cough, and shortness of breath should be reported immediately. Patient advised to discontinue Arava treatment and consult with a physician prior to attempting conception. The patient will have to undergo a treatment to eliminate Arava from the body prior to conception.
Tranexamic Acid Pregnancy And Lactation Text: It is unknown if this medication is safe during pregnancy or breast feeding.
Quinolones Counseling:  I discussed with the patient the risks of fluoroquinolones including but not limited to GI upset, allergic reaction, drug rash, diarrhea, dizziness, photosensitivity, yeast infections, liver function test abnormalities, tendonitis/tendon rupture.
Spironolactone Counseling: Patient advised regarding risks of diarrhea, abdominal pain, hyperkalemia, birth defects (for female patients), liver toxicity and renal toxicity. The patient may need blood work to monitor liver and kidney function and potassium levels while on therapy. The patient verbalized understanding of the proper use and possible adverse effects of spironolactone.  All of the patient's questions and concerns were addressed.
Protopic Counseling: Patient may experience a mild burning sensation during topical application. Protopic is not approved in children less than 2 years of age. There have been case reports of hematologic and skin malignancies in patients using topical calcineurin inhibitors although causality is questionable.
Imiquimod Counseling:  I discussed with the patient the risks of imiquimod including but not limited to erythema, scaling, itching, weeping, crusting, and pain.  Patient understands that the inflammatory response to imiquimod is variable from person to person and was educated regarded proper titration schedule.  If flu-like symptoms develop, patient knows to discontinue the medication and contact us.
Olumiant Counseling: I discussed with the patient the risks of Olumiant therapy including but not limited to upper respiratory tract infections, shingles, cold sores, and nausea. Live vaccines should be avoided.  This medication has been linked to serious infections; higher rate of mortality; malignancy and lymphoproliferative disorders; major adverse cardiovascular events; thrombosis; gastrointestinal perforations; neutropenia; lymphopenia; anemia; liver enzyme elevations; and lipid elevations.
Acitretin Pregnancy And Lactation Text: This medication is Pregnancy Category X and should not be given to women who are pregnant or may become pregnant in the future. This medication is excreted in breast milk.
Topical Metronidazole Pregnancy And Lactation Text: This medication is Pregnancy Category B and considered safe during pregnancy.  It is also considered safe to use while breastfeeding.
Humira Counseling:  I discussed with the patient the risks of adalimumab including but not limited to myelosuppression, immunosuppression, autoimmune hepatitis, demyelinating diseases, lymphoma, and serious infections.  The patient understands that monitoring is required including a PPD at baseline and must alert us or the primary physician if symptoms of infection or other concerning signs are noted.
Adbry Pregnancy And Lactation Text: It is unknown if this medication will adversely affect pregnancy or breast feeding.
Glycopyrrolate Counseling:  I discussed with the patient the risks of glycopyrrolate including but not limited to skin rash, drowsiness, dry mouth, difficulty urinating, and blurred vision.
Simponi Counseling:  I discussed with the patient the risks of golimumab including but not limited to myelosuppression, immunosuppression, autoimmune hepatitis, demyelinating diseases, lymphoma, and serious infections.  The patient understands that monitoring is required including a PPD at baseline and must alert us or the primary physician if symptoms of infection or other concerning signs are noted.
Nsaids Pregnancy And Lactation Text: These medications are considered safe up to 30 weeks gestation. It is excreted in breast milk.
Azelaic Acid Counseling: Patient counseled that medicine may cause skin irritation and to avoid applying near the eyes.  In the event of skin irritation, the patient was advised to reduce the amount of the drug applied or use it less frequently.   The patient verbalized understanding of the proper use and possible adverse effects of azelaic acid.  All of the patient's questions and concerns were addressed.
Itraconazole Counseling:  I discussed with the patient the risks of itraconazole including but not limited to liver damage, nausea/vomiting, neuropathy, and severe allergy.  The patient understands that this medication is best absorbed when taken with acidic beverages such as non-diet cola or ginger ale.  The patient understands that monitoring is required including baseline LFTs and repeat LFTs at intervals.  The patient understands that they are to contact us or the primary physician if concerning signs are noted.
Clindamycin Pregnancy And Lactation Text: This medication can be used in pregnancy if certain situations. Clindamycin is also present in breast milk.
Oxybutynin Counseling:  I discussed with the patient the risks of oxybutynin including but not limited to skin rash, drowsiness, dry mouth, difficulty urinating, and blurred vision.
Topical Retinoid counseling:  Patient advised to apply a pea-sized amount only at bedtime and wait 30 minutes after washing their face before applying.  If too drying, patient may add a non-comedogenic moisturizer. The patient verbalized understanding of the proper use and possible adverse effects of retinoids.  All of the patient's questions and concerns were addressed.
Doxepin Pregnancy And Lactation Text: This medication is Pregnancy Category C and it isn't known if it is safe during pregnancy. It is also excreted in breast milk and breast feeding isn't recommended.
Methotrexate Counseling:  Patient counseled regarding adverse effects of methotrexate including but not limited to nausea, vomiting, abnormalities in liver function tests. Patients may develop mouth sores, rash, diarrhea, and abnormalities in blood counts. The patient understands that monitoring is required including LFT's and blood counts.  There is a rare possibility of scarring of the liver and lung problems that can occur when taking methotrexate. Persistent nausea, loss of appetite, pale stools, dark urine, cough, and shortness of breath should be reported immediately. Patient advised to discontinue methotrexate treatment at least three months before attempting to become pregnant.  I discussed the need for folate supplements while taking methotrexate.  These supplements can decrease side effects during methotrexate treatment. The patient verbalized understanding of the proper use and possible adverse effects of methotrexate.  All of the patient's questions and concerns were addressed.
Drysol Counseling:  I discussed with the patient the risks of drysol/aluminum chloride including but not limited to skin rash, itching, irritation, burning.
Olumiant Pregnancy And Lactation Text: Based on animal studies, Olumiant may cause embryo-fetal harm when administered to pregnant women.  The medication should not be used in pregnancy.  Breastfeeding is not recommended during treatment.
Valtrex Counseling: I discussed with the patient the risks of valacyclovir including but not limited to kidney damage, nausea, vomiting and severe allergy.  The patient understands that if the infection seems to be worsening or is not improving, they are to call.
Dutasteride Male Counseling: Dustasteride Counseling:  I discussed with the patient the risks of use of dutasteride including but not limited to decreased libido, decreased ejaculate volume, and gynecomastia. Women who can become pregnant should not handle medication.  All of the patient's questions and concerns were addressed.
Protopic Pregnancy And Lactation Text: This medication is Pregnancy Category C. It is unknown if this medication is excreted in breast milk when applied topically.
Spironolactone Pregnancy And Lactation Text: This medication can cause feminization of the male fetus and should be avoided during pregnancy. The active metabolite is also found in breast milk.

## 2023-10-26 NOTE — PROCEDURE: LIQUID NITROGEN
Detail Level: Detailed
Render Post-Care Instructions In Note?: no
Number Of Freeze-Thaw Cycles: 1 freeze-thaw cycle
Show Applicator Variable?: Yes
Consent: The patient's consent was obtained including but not limited to risks of crusting, scabbing, blistering, scarring, darker or lighter pigmentary change, recurrence, incomplete removal and infection.
Aperture Size (Optional): C
Application Tool (Optional): Cry-AC
Duration Of Freeze Thaw-Cycle (Seconds): 3
Post-Care Instructions: I reviewed with the patient in detail post-care instructions. Patient is to wear sunprotection, and avoid picking at any of the treated lesions. Pt may apply Vaseline to crusted or scabbing areas.

## 2023-10-30 RX ORDER — VALACYCLOVIR HYDROCHLORIDE 1 G/1
TABLET, FILM COATED ORAL BID
Qty: 14 | Refills: 0 | Status: ERX | COMMUNITY
Start: 2023-10-30

## 2023-10-30 RX ADMIN — VALACYCLOVIR HYDROCHLORIDE: 1 TABLET, FILM COATED ORAL at 00:00

## 2024-05-20 ENCOUNTER — APPOINTMENT (RX ONLY)
Dept: URBAN - METROPOLITAN AREA CLINIC 4 | Facility: CLINIC | Age: 89
Setting detail: DERMATOLOGY
End: 2024-05-20

## 2024-05-20 DIAGNOSIS — Z85.828 PERSONAL HISTORY OF OTHER MALIGNANT NEOPLASM OF SKIN: ICD-10-CM

## 2024-05-20 DIAGNOSIS — L82.1 OTHER SEBORRHEIC KERATOSIS: ICD-10-CM

## 2024-05-20 DIAGNOSIS — D18.0 HEMANGIOMA: ICD-10-CM

## 2024-05-20 DIAGNOSIS — L57.0 ACTINIC KERATOSIS: ICD-10-CM

## 2024-05-20 PROBLEM — D18.01 HEMANGIOMA OF SKIN AND SUBCUTANEOUS TISSUE: Status: ACTIVE | Noted: 2024-05-20

## 2024-05-20 PROCEDURE — 99213 OFFICE O/P EST LOW 20 MIN: CPT | Mod: 25

## 2024-05-20 PROCEDURE — ? LIQUID NITROGEN

## 2024-05-20 PROCEDURE — ? OBSERVATION

## 2024-05-20 PROCEDURE — ? COUNSELING

## 2024-05-20 PROCEDURE — 17000 DESTRUCT PREMALG LESION: CPT

## 2024-05-20 ASSESSMENT — LOCATION DETAILED DESCRIPTION DERM
LOCATION DETAILED: LEFT PROXIMAL DORSAL FOREARM
LOCATION DETAILED: LEFT PROXIMAL POSTERIOR UPPER ARM
LOCATION DETAILED: RIGHT RADIAL DORSAL HAND
LOCATION DETAILED: LEFT UPPER CUTANEOUS LIP
LOCATION DETAILED: RIGHT INFERIOR CENTRAL MALAR CHEEK
LOCATION DETAILED: LEFT SUPERIOR POSTERIOR NECK
LOCATION DETAILED: RIGHT PROXIMAL DORSAL FOREARM
LOCATION DETAILED: RIGHT MEDIAL UPPER BACK
LOCATION DETAILED: RIGHT DISTAL POSTERIOR UPPER ARM

## 2024-05-20 ASSESSMENT — LOCATION SIMPLE DESCRIPTION DERM
LOCATION SIMPLE: LEFT LIP
LOCATION SIMPLE: RIGHT CHEEK
LOCATION SIMPLE: LEFT FOREARM
LOCATION SIMPLE: RIGHT HAND
LOCATION SIMPLE: POSTERIOR NECK
LOCATION SIMPLE: LEFT UPPER ARM
LOCATION SIMPLE: RIGHT UPPER ARM
LOCATION SIMPLE: RIGHT FOREARM
LOCATION SIMPLE: RIGHT UPPER BACK

## 2024-05-20 ASSESSMENT — LOCATION ZONE DERM
LOCATION ZONE: HAND
LOCATION ZONE: NECK
LOCATION ZONE: FACE
LOCATION ZONE: TRUNK
LOCATION ZONE: ARM
LOCATION ZONE: LIP

## 2024-05-20 NOTE — PROCEDURE: LIQUID NITROGEN
Duration Of Freeze Thaw-Cycle (Seconds): 3
Post-Care Instructions: I reviewed with the patient in detail post-care instructions. Patient is to wear sunprotection, and avoid picking at any of the treated lesions. Pt may apply Vaseline to crusted or scabbing areas.
Number Of Freeze-Thaw Cycles: 1 freeze-thaw cycle
Render Post-Care Instructions In Note?: no
Show Applicator Variable?: Yes
Consent: The patient's consent was obtained including but not limited to risks of crusting, scabbing, blistering, scarring, darker or lighter pigmentary change, recurrence, incomplete removal and infection.
Detail Level: Detailed
Application Tool (Optional): Cry-AC
Aperture Size (Optional): C

## 2024-11-18 ENCOUNTER — APPOINTMENT (RX ONLY)
Dept: URBAN - METROPOLITAN AREA CLINIC 4 | Facility: CLINIC | Age: 89
Setting detail: DERMATOLOGY
End: 2024-11-18

## 2024-11-18 DIAGNOSIS — L57.0 ACTINIC KERATOSIS: ICD-10-CM

## 2024-11-18 DIAGNOSIS — Z85.828 PERSONAL HISTORY OF OTHER MALIGNANT NEOPLASM OF SKIN: ICD-10-CM

## 2024-11-18 DIAGNOSIS — L82.1 OTHER SEBORRHEIC KERATOSIS: ICD-10-CM

## 2024-11-18 DIAGNOSIS — D18.0 HEMANGIOMA: ICD-10-CM

## 2024-11-18 PROBLEM — D18.01 HEMANGIOMA OF SKIN AND SUBCUTANEOUS TISSUE: Status: ACTIVE | Noted: 2024-11-18

## 2024-11-18 PROBLEM — D48.5 NEOPLASM OF UNCERTAIN BEHAVIOR OF SKIN: Status: ACTIVE | Noted: 2024-11-18

## 2024-11-18 PROCEDURE — ? BIOPSY BY SHAVE METHOD

## 2024-11-18 PROCEDURE — 99214 OFFICE O/P EST MOD 30 MIN: CPT | Mod: 25

## 2024-11-18 PROCEDURE — ? PRESCRIPTION

## 2024-11-18 PROCEDURE — 11102 TANGNTL BX SKIN SINGLE LES: CPT

## 2024-11-18 PROCEDURE — ? OBSERVATION

## 2024-11-18 PROCEDURE — ? ADDITIONAL NOTES

## 2024-11-18 PROCEDURE — ? COUNSELING

## 2024-11-18 RX ORDER — FLUOROURACIL 2 G/40G
CREAM TOPICAL BID
Qty: 40 | Refills: 0 | Status: ERX | COMMUNITY
Start: 2024-11-18

## 2024-11-18 RX ADMIN — FLUOROURACIL: 2 CREAM TOPICAL at 00:00

## 2024-11-18 ASSESSMENT — LOCATION ZONE DERM
LOCATION ZONE: ARM
LOCATION ZONE: NECK
LOCATION ZONE: TRUNK
LOCATION ZONE: FACE
LOCATION ZONE: LIP

## 2024-11-18 ASSESSMENT — LOCATION DETAILED DESCRIPTION DERM
LOCATION DETAILED: RIGHT INFERIOR CENTRAL MALAR CHEEK
LOCATION DETAILED: LEFT PROXIMAL POSTERIOR UPPER ARM
LOCATION DETAILED: LEFT UPPER CUTANEOUS LIP
LOCATION DETAILED: LEFT PROXIMAL DORSAL FOREARM
LOCATION DETAILED: RIGHT PROXIMAL DORSAL FOREARM
LOCATION DETAILED: RIGHT MEDIAL UPPER BACK
LOCATION DETAILED: RIGHT DISTAL POSTERIOR UPPER ARM
LOCATION DETAILED: LEFT SUPERIOR POSTERIOR NECK

## 2024-11-18 ASSESSMENT — LOCATION SIMPLE DESCRIPTION DERM
LOCATION SIMPLE: LEFT FOREARM
LOCATION SIMPLE: RIGHT UPPER ARM
LOCATION SIMPLE: RIGHT CHEEK
LOCATION SIMPLE: LEFT LIP
LOCATION SIMPLE: LEFT UPPER ARM
LOCATION SIMPLE: RIGHT UPPER BACK
LOCATION SIMPLE: RIGHT FOREARM
LOCATION SIMPLE: POSTERIOR NECK

## 2024-11-18 NOTE — PROCEDURE: ADDITIONAL NOTES
Additional Notes: ED&C performed at the time of the visit. 
Render Risk Assessment In Note?: no
Detail Level: Simple

## 2024-11-18 NOTE — PROCEDURE: BIOPSY BY SHAVE METHOD
Detail Level: Detailed
Depth Of Biopsy: dermis
Was A Bandage Applied: Yes
Size Of Lesion In Cm: 0
Biopsy Type: H and E
Biopsy Method: Dermablade
Anesthesia Type: 1% lidocaine without epinephrine
Anesthesia Volume In Cc: 0.5
Hemostasis: Drysol
Wound Care: Petrolatum
Dressing: bandage
Type Of Destruction Used: Curettage
Curettage Text: The wound bed was treated with curettage after the biopsy was performed.
Cryotherapy Text: The wound bed was treated with cryotherapy after the biopsy was performed.
Electrodesiccation Text: The wound bed was treated with electrodesiccation after the biopsy was performed.
Electrodesiccation And Curettage Text: The wound bed was treated with electrodesiccation and curettage after the biopsy was performed.
Silver Nitrate Text: The wound bed was treated with silver nitrate after the biopsy was performed.
Lab: [id number]
Lab Facility: 
Render Path Notes In Note?: No
Consent: Written consent was obtained and risks were reviewed including but not limited to scarring, infection, bleeding, scabbing, incomplete removal, nerve damage and allergy to anesthesia.
Post-Care Instructions: I reviewed with the patient in detail post-care instructions. Patient is to keep the biopsy site dry overnight, and then apply bacitracin twice daily until healed. Patient may apply hydrogen peroxide soaks to remove any crusting.
Notification Instructions: Patient will be notified of biopsy results. However, patient instructed to call the office if not contacted within 2 weeks.
Billing Type: Third-Party Bill
Information: Selecting Yes will display possible errors in your note based on the variables you have selected. This validation is only offered as a suggestion for you. PLEASE NOTE THAT THE VALIDATION TEXT WILL BE REMOVED WHEN YOU FINALIZE YOUR NOTE. IF YOU WANT TO FAX A PRELIMINARY NOTE YOU WILL NEED TO TOGGLE THIS TO 'NO' IF YOU DO NOT WANT IT IN YOUR FAXED NOTE.

## 2025-03-31 ENCOUNTER — APPOINTMENT (OUTPATIENT)
Dept: URBAN - METROPOLITAN AREA CLINIC 6 | Facility: CLINIC | Age: OVER 89
Setting detail: DERMATOLOGY
End: 2025-03-31

## 2025-03-31 DIAGNOSIS — L57.0 ACTINIC KERATOSIS: ICD-10-CM

## 2025-03-31 DIAGNOSIS — L82.1 OTHER SEBORRHEIC KERATOSIS: ICD-10-CM

## 2025-03-31 PROCEDURE — ? ADDITIONAL NOTES

## 2025-03-31 PROCEDURE — 17000 DESTRUCT PREMALG LESION: CPT

## 2025-03-31 PROCEDURE — ? COUNSELING

## 2025-03-31 PROCEDURE — ? LIQUID NITROGEN

## 2025-03-31 PROCEDURE — 99212 OFFICE O/P EST SF 10 MIN: CPT | Mod: 25

## 2025-03-31 PROCEDURE — 17003 DESTRUCT PREMALG LES 2-14: CPT

## 2025-03-31 ASSESSMENT — LOCATION DETAILED DESCRIPTION DERM
LOCATION DETAILED: RIGHT CENTRAL TEMPLE
LOCATION DETAILED: LEFT SUPERIOR CENTRAL MALAR CHEEK

## 2025-03-31 ASSESSMENT — LOCATION SIMPLE DESCRIPTION DERM
LOCATION SIMPLE: LEFT CHEEK
LOCATION SIMPLE: RIGHT TEMPLE

## 2025-03-31 ASSESSMENT — LOCATION ZONE DERM: LOCATION ZONE: FACE

## 2025-03-31 NOTE — PROCEDURE: ADDITIONAL NOTES
Additional Notes: A few lesions were treated with cryotherapy today as a courtesy
Render Risk Assessment In Note?: no
Detail Level: Detailed

## 2025-03-31 NOTE — PROCEDURE: LIQUID NITROGEN
Render Note In Bullet Format When Appropriate: No
Number Of Freeze-Thaw Cycles: 2 freeze-thaw cycles
Post-Care Instructions: I reviewed with the patient in detail post-care instructions. Patient is to wear sunprotection, and avoid picking at any of the treated lesions. Pt may apply Vaseline to crusted or scabbing areas.
Duration Of Freeze Thaw-Cycle (Seconds): 8
Consent: The patient's verbal consent was obtained including but not limited to risks of crusting, scabbing, blistering, scarring, darker or lighter pigmentary change, recurrence, incomplete removal and infection.
Detail Level: Zone
Show Aperture Variable?: Yes

## (undated) DEVICE — GLOVE BIOGEL SZ 8 SURGICAL PF LTX - (50PR/BX 4BX/CA)

## (undated) DEVICE — GLOVE BIOGEL ECLIPSE PF LATEX SIZE 8.0  (50PR/BX)

## (undated) DEVICE — GLOVE BIOGEL SZ 6.5 SURGICAL PF LTX (50PR/BX 4BX/CA)

## (undated) DEVICE — DRESSING 3X3 ADAPTIC GAUZE - (50EA/CT)

## (undated) DEVICE — LACTATED RINGERS INJ. 500 ML - (24EA/CA)

## (undated) DEVICE — NEEDLE HYPO NON-SAFETY 18 GA X 1 IN (100/BX)

## (undated) DEVICE — BLADE SURGICAL #15 - (50/BX 3BX/CA)

## (undated) DEVICE — CANNULA ANTERIOR CHAMBER 27G

## (undated) DEVICE — CANNULA DIVIDED ADULT CO2 - SAMPLE W/FEMALE CONNCT (25/CA)

## (undated) DEVICE — CON SEDATION/>5 YR 1ST 15 MIN

## (undated) DEVICE — PACK LOWER EXTREMITY - (2/CA)

## (undated) DEVICE — CHLORAPREP 26 ML APPLICATOR - ORANGE TINT(25/CA)

## (undated) DEVICE — Device

## (undated) DEVICE — CANISTER SUCTION 3000ML MECHANICAL FILTER AUTO SHUTOFF MEDI-VAC NONSTERILE LF DISP  (40EA/CA)

## (undated) DEVICE — ELECTRODE DUAL RETURN W/ CORD - (50/PK)

## (undated) DEVICE — CYSTOTOME 27G

## (undated) DEVICE — KIT ANESTHESIA W/CIRCUIT & 3/LT BAG W/FILTER (20EA/CA)

## (undated) DEVICE — CONTAINER SPECIMEN BAG OR - STERILE 4 OZ W/LID (100EA/CA)

## (undated) DEVICE — CON SEDATION EA ADDL 15 MIN

## (undated) DEVICE — GLOVE BIOGEL INDICATOR SZ 6.5 SURGICAL PF LTX - (50PR/BX 4BX/CA)

## (undated) DEVICE — SUCTION INSTRUMENT YANKAUER BULBOUS TIP W/O VENT (50EA/CA)

## (undated) DEVICE — SODIUM CHL IRRIGATION 0.9% 1000ML (12EA/CA)

## (undated) DEVICE — TUBING CLEARLINK DUO-VENT - C-FLO (48EA/CA)

## (undated) DEVICE — SENSOR SPO2 NEO LNCS ADHESIVE (20/BX) SEE USER NOTES

## (undated) DEVICE — SET LEADWIRE 5 LEAD BEDSIDE DISPOSABLE ECG (1SET OF 5/EA)

## (undated) DEVICE — CUFF SOFT ADLT 12W/RECTUS - CONNECTER (20/CA)

## (undated) DEVICE — TUBING IV NEEDLESS PRE-OP - (50/CA)

## (undated) DEVICE — KIT ROOM DECONTAMINATION

## (undated) DEVICE — EXTRACTOR CORTEX ANGL LT 26GA - (10/BX) BINKHORST

## (undated) DEVICE — GLOVE SURGICAL PROTEXIS 8 1/2 - (50PR/BX)

## (undated) DEVICE — HEAD HOLDER JUNIOR/ADULT

## (undated) DEVICE — PROTECTOR ULNA NERVE - (36PR/CA)

## (undated) DEVICE — KNIFE STEP 1.1 (6EA/BX)

## (undated) DEVICE — WATER IRRIGATION STERILE 1000ML (12EA/CA)

## (undated) DEVICE — SET EXTENSION WITH 2 PORTS (48EA/CA) ***PART #2C8610 IS A SUBSTITUTE*****

## (undated) DEVICE — ATOMIC EDGE ACCURATE DEPTH 550 MICRON (10/CA)

## (undated) DEVICE — GLOVE BIOGEL PI INDICATOR SZ 8.0 SURGICAL PF LF -(50/BX 4BX/CA)

## (undated) DEVICE — IRRIGATING BSS PLUS 500ML STR (6EA/CA)

## (undated) DEVICE — GOWN WARMING STANDARD FLEX - (30/CA)

## (undated) DEVICE — NEPTUNE 4 PORT MANIFOLD - (20/PK)

## (undated) DEVICE — ELECTRODE 850 FOAM ADHESIVE - HYDROGEL RADIOTRNSPRNT (50/PK)

## (undated) DEVICE — MASK, LARYNGEAL AIRWAY #5

## (undated) DEVICE — PADDING CAST 4 IN STERILE - 4 X 4 YDS (24/CA)

## (undated) DEVICE — SYRINGE 1 ML LL POLYCARBONATE LUER LOCK (100EA/BX 8BX/CA)

## (undated) DEVICE — SUTURE 3-0 ETHILON PS-1 (36PK/BX)

## (undated) DEVICE — BANDAGE ELASTIC 4 HONEYCOMB - 4"X5YD LF (20/CA)"

## (undated) DEVICE — SUTURE GENERAL

## (undated) DEVICE — LACTATED RINGERS INJ 1000 ML - (14EA/CA 60CA/PF)

## (undated) DEVICE — MASK ANESTHESIA ADULT  - (100/CA)

## (undated) DEVICE — KIT  I.V. START (100EA/CA)